# Patient Record
Sex: FEMALE | Race: WHITE | NOT HISPANIC OR LATINO | Employment: OTHER | URBAN - METROPOLITAN AREA
[De-identification: names, ages, dates, MRNs, and addresses within clinical notes are randomized per-mention and may not be internally consistent; named-entity substitution may affect disease eponyms.]

---

## 2020-12-16 ENCOUNTER — OFFICE VISIT (OUTPATIENT)
Dept: FAMILY MEDICINE | Facility: CLINIC | Age: 70
End: 2020-12-16
Payer: MEDICARE

## 2020-12-16 VITALS
OXYGEN SATURATION: 98 % | SYSTOLIC BLOOD PRESSURE: 170 MMHG | HEART RATE: 96 BPM | BODY MASS INDEX: 31.54 KG/M2 | TEMPERATURE: 97 F | WEIGHT: 178 LBS | DIASTOLIC BLOOD PRESSURE: 80 MMHG | HEIGHT: 63 IN

## 2020-12-16 DIAGNOSIS — I10 ESSENTIAL HYPERTENSION: ICD-10-CM

## 2020-12-16 DIAGNOSIS — Z86.010 HISTORY OF COLONIC POLYPS: ICD-10-CM

## 2020-12-16 DIAGNOSIS — M25.531 RIGHT WRIST PAIN: ICD-10-CM

## 2020-12-16 DIAGNOSIS — R25.2 MUSCLE CRAMPS AT NIGHT: ICD-10-CM

## 2020-12-16 DIAGNOSIS — E78.5 HYPERLIPIDEMIA, UNSPECIFIED HYPERLIPIDEMIA TYPE: ICD-10-CM

## 2020-12-16 DIAGNOSIS — E11.9 TYPE 2 DIABETES MELLITUS WITHOUT COMPLICATION, WITHOUT LONG-TERM CURRENT USE OF INSULIN: Primary | ICD-10-CM

## 2020-12-16 PROCEDURE — 99203 OFFICE O/P NEW LOW 30 MIN: CPT | Mod: S$GLB,,, | Performed by: INTERNAL MEDICINE

## 2020-12-16 PROCEDURE — 99203 PR OFFICE/OUTPT VISIT, NEW, LEVL III, 30-44 MIN: ICD-10-PCS | Mod: S$GLB,,, | Performed by: INTERNAL MEDICINE

## 2020-12-16 RX ORDER — METFORMIN HYDROCHLORIDE 1000 MG/1
1000 TABLET, FILM COATED, EXTENDED RELEASE ORAL DAILY
COMMUNITY
End: 2020-12-16 | Stop reason: SDUPTHER

## 2020-12-16 RX ORDER — METFORMIN HYDROCHLORIDE 1000 MG/1
1000 TABLET, FILM COATED, EXTENDED RELEASE ORAL
Qty: 90 TABLET | Refills: 1 | Status: SHIPPED | OUTPATIENT
Start: 2020-12-16 | End: 2021-05-19 | Stop reason: SDUPTHER

## 2020-12-16 RX ORDER — LOSARTAN POTASSIUM 25 MG/1
25 TABLET ORAL DAILY
COMMUNITY
End: 2020-12-16 | Stop reason: SDUPTHER

## 2020-12-16 RX ORDER — MULTIVITAMIN
1 TABLET ORAL DAILY
COMMUNITY

## 2020-12-16 RX ORDER — CALCIUM CARBONATE 500(1250)
1 TABLET,CHEWABLE ORAL DAILY
COMMUNITY

## 2020-12-16 RX ORDER — ROSUVASTATIN CALCIUM 10 MG/1
10 TABLET, COATED ORAL DAILY
Qty: 90 TABLET | Refills: 1 | Status: SHIPPED | OUTPATIENT
Start: 2020-12-16 | End: 2021-05-19 | Stop reason: SDUPTHER

## 2020-12-16 RX ORDER — ROSUVASTATIN CALCIUM 10 MG/1
10 TABLET, COATED ORAL DAILY
COMMUNITY
End: 2020-12-16 | Stop reason: SDUPTHER

## 2020-12-16 RX ORDER — LOSARTAN POTASSIUM 50 MG/1
50 TABLET ORAL DAILY
Qty: 90 TABLET | Refills: 1 | Status: SHIPPED | OUTPATIENT
Start: 2020-12-16 | End: 2021-05-19 | Stop reason: SDUPTHER

## 2020-12-16 NOTE — PROGRESS NOTES
Subjective:       Patient ID: May Solomon is a 70 y.o. female.    Chief Complaint: Establish Care (new patient establishment) and Wrist Pain (right wrist pain)    Here to establish care; recently moved from [place].  She has a h/o DM2, HTN HLD.  It has been at least 6 months since last labs.      Diabetes  She presents for her follow-up diabetic visit. She has type 2 diabetes mellitus. Her disease course has been stable. Pertinent negatives for hypoglycemia include no confusion, dizziness, headaches, nervousness/anxiousness, pallor, seizures, speech difficulty or tremors. Pertinent negatives for diabetes include no blurred vision, no chest pain, no fatigue, no foot paresthesias, no foot ulcerations, no polydipsia, no polyphagia, no polyuria, no visual change, no weakness and no weight loss. There are no hypoglycemic complications. Current diabetic treatment includes oral agent (monotherapy). She is compliant with treatment all of the time. Weight trend: has gained 8-10 pounds. She monitors blood glucose at home 1-2 x per day. Her breakfast blood glucose range is generally 110-130 mg/dl. An ACE inhibitor/angiotensin II receptor blocker is being taken. Eye exam is current (01/2020).   Hypertension  This is a chronic problem. Condition status: usually 120s/80s when she checks it which isn't very often. Pertinent negatives include no anxiety, blurred vision, chest pain, headaches, malaise/fatigue, neck pain, palpitations, peripheral edema or shortness of breath. Past treatments include angiotensin blockers. There are no compliance problems.    Hyperlipidemia  This is a chronic problem. Exacerbating diseases include diabetes. Associated symptoms include myalgias (nuvia horses in both legs when lying down). Pertinent negatives include no chest pain or shortness of breath. Current antihyperlipidemic treatment includes statins. There are no compliance problems.    Wrist Pain   The pain is present in the right  wrist. This is a new problem. The current episode started 1 to 4 weeks ago (about 2 weeks). There has been no history of extremity trauma (but has been moving a lot of boxes during recent move). The quality of the pain is described as aching. The pain is mild. Pertinent negatives include no fever or numbness. Her past medical history is significant for diabetes.     Review of Systems   Constitutional: Negative for activity change, appetite change, chills, diaphoresis, fatigue, fever, malaise/fatigue, unexpected weight change and weight loss.   HENT: Negative for congestion, ear discharge, ear pain, hearing loss, nosebleeds, postnasal drip, rhinorrhea, sinus pressure, sinus pain, sneezing, sore throat, tinnitus, trouble swallowing and voice change.    Eyes: Negative for blurred vision, photophobia, pain, discharge, redness, itching and visual disturbance.   Respiratory: Negative for apnea, cough, choking, chest tightness, shortness of breath, wheezing and stridor.    Cardiovascular: Negative for chest pain, palpitations and leg swelling.   Gastrointestinal: Negative for abdominal distention, abdominal pain, anal bleeding, blood in stool, constipation, diarrhea, nausea, rectal pain and vomiting.   Endocrine: Negative for cold intolerance, heat intolerance, polydipsia, polyphagia and polyuria.   Genitourinary: Negative for decreased urine volume, difficulty urinating, dysuria, frequency, genital sores, hematuria and urgency.   Musculoskeletal: Positive for arthralgias (right wrist pain) and myalgias (nuvia horses in both legs when lying down). Negative for back pain, gait problem, joint swelling, neck pain and neck stiffness.   Skin: Negative for color change, pallor, rash and wound.   Allergic/Immunologic: Negative for environmental allergies and food allergies.   Neurological: Negative for dizziness, tremors, seizures, syncope, facial asymmetry, speech difficulty, weakness, light-headedness, numbness and headaches.    Hematological: Negative for adenopathy. Does not bruise/bleed easily.   Psychiatric/Behavioral: Negative for confusion, decreased concentration, dysphoric mood, hallucinations, self-injury, sleep disturbance and suicidal ideas. The patient is not nervous/anxious.        Past Medical History:   Diagnosis Date    Diabetes mellitus, type 2     History of colonic polyps     HTN (hypertension)     Hyperlipidemia       Past Surgical History:   Procedure Laterality Date    COLONOSCOPY  05/2017       Family History   Problem Relation Age of Onset    Colon cancer Mother 76    Hypertension Mother     Obesity Mother     Prostate cancer Father     Breast cancer Neg Hx        Social History     Socioeconomic History    Marital status:      Spouse name: Not on file    Number of children: Not on file    Years of education: Not on file    Highest education level: Not on file   Occupational History    Occupation: retired   Social Needs    Financial resource strain: Not on file    Food insecurity     Worry: Not on file     Inability: Not on file    Transportation needs     Medical: Not on file     Non-medical: Not on file   Tobacco Use    Smoking status: Never Smoker    Smokeless tobacco: Never Used   Substance and Sexual Activity    Alcohol use: Yes     Frequency: 2-4 times a month     Drinks per session: 1 or 2     Comment: occasional    Drug use: Never    Sexual activity: Not Currently   Lifestyle    Physical activity     Days per week: Not on file     Minutes per session: Not on file    Stress: Only a little   Relationships    Social connections     Talks on phone: Not on file     Gets together: Not on file     Attends Confucianism service: Not on file     Active member of club or organization: Not on file     Attends meetings of clubs or organizations: Not on file     Relationship status: Not on file   Other Topics Concern    Not on file   Social History Narrative    Live with son       Current  "Outpatient Medications   Medication Sig Dispense Refill    calcium carbonate (OS-SANDEEP) 500 mg calcium (1,250 mg) chewable tablet Take 1 tablet by mouth once daily.      losartan (COZAAR) 50 MG tablet Take 1 tablet (50 mg total) by mouth once daily. 90 tablet 1    metFORMIN (GLUMETZA) 1000 MG (MOD) 24hr tablet Take 1 tablet (1,000 mg total) by mouth daily with breakfast. 90 tablet 1    multivitamin (ONE DAILY MULTIVITAMIN) per tablet Take 1 tablet by mouth once daily.      rosuvastatin (CRESTOR) 10 MG tablet Take 1 tablet (10 mg total) by mouth once daily. 90 tablet 1     No current facility-administered medications for this visit.        Review of patient's allergies indicates:   Allergen Reactions    Sulfa (sulfonamide antibiotics) Rash     Objective:    HPI     Establish Care      Additional comments: new patient establishment              Wrist Pain      Additional comments: right wrist pain          Last edited by Nelson Rosario MA on 12/16/2020  3:49 PM. (History)      Blood pressure (!) 170/80, pulse 96, temperature 96.6 °F (35.9 °C), temperature source Temporal, height 5' 3" (1.6 m), weight 80.7 kg (178 lb), SpO2 98 %. Body mass index is 31.53 kg/m².   Physical Exam  Vitals signs and nursing note reviewed.   Constitutional:       General: She is not in acute distress.     Appearance: She is well-developed. She is obese. She is not ill-appearing, toxic-appearing or diaphoretic.   HENT:      Head: Normocephalic and atraumatic.      Right Ear: Hearing, tympanic membrane, ear canal and external ear normal.      Left Ear: Hearing, tympanic membrane, ear canal and external ear normal.      Nose: Nose normal.      Mouth/Throat:      Pharynx: Uvula midline.   Eyes:      General: Lids are normal. No scleral icterus.        Right eye: No discharge.         Left eye: No discharge.      Conjunctiva/sclera: Conjunctivae normal.      Right eye: Right conjunctiva is not injected. No hemorrhage.     Left eye: Left " conjunctiva is not injected. No hemorrhage.     Pupils: Pupils are equal, round, and reactive to light.   Neck:      Thyroid: No thyromegaly.      Vascular: No carotid bruit.   Cardiovascular:      Rate and Rhythm: Normal rate and regular rhythm.      Pulses:           Dorsalis pedis pulses are 2+ on the right side and 2+ on the left side.      Heart sounds: Normal heart sounds. No murmur. No friction rub. No gallop.    Pulmonary:      Effort: Pulmonary effort is normal. No respiratory distress.      Breath sounds: Normal breath sounds. No wheezing, rhonchi or rales.   Abdominal:      General: Bowel sounds are normal. There is no distension or abdominal bruit.      Palpations: Abdomen is soft. There is no mass or pulsatile mass.      Tenderness: There is no abdominal tenderness. There is no guarding or rebound.      Hernia: No hernia is present.   Musculoskeletal:      Right lower leg: No edema.      Left lower leg: No edema.   Lymphadenopathy:      Cervical: No cervical adenopathy.   Skin:     General: Skin is warm and dry.   Neurological:      General: No focal deficit present.      Mental Status: She is alert.      Motor: No tremor.      Deep Tendon Reflexes: Reflexes are normal and symmetric.   Psychiatric:         Mood and Affect: Mood normal.         Speech: Speech normal.         Behavior: Behavior normal.             Assessment:       1. Type 2 diabetes mellitus without complication, without long-term current use of insulin    2. Essential hypertension    3. History of colonic polyps    4. Hyperlipidemia, unspecified hyperlipidemia type    5. Muscle cramps at night    6. Right wrist pain        Plan:       May was seen today for establish care and wrist pain.    Diagnoses and all orders for this visit:    Type 2 diabetes mellitus without complication, without long-term current use of insulin  -     metFORMIN (GLUMETZA) 1000 MG (MOD) 24hr tablet; Take 1 tablet (1,000 mg total) by mouth daily with  breakfast.  -     rosuvastatin (CRESTOR) 10 MG tablet; Take 1 tablet (10 mg total) by mouth once daily.  -     Comprehensive Metabolic Panel; Future  -     Hemoglobin A1C; Future  -     Lipid Panel; Future  -     Microalbumin/Creatinine Ratio, Urine; Future  -     Urinalysis; Future  -     Ambulatory referral/consult to Ophthalmology; Future  -     Comprehensive Metabolic Panel  -     Hemoglobin A1C  -     Lipid Panel  -     Microalbumin/Creatinine Ratio, Urine  -     Urinalysis    Essential hypertension  -     losartan (COZAAR) 50 MG tablet; Take 1 tablet (50 mg total) by mouth once daily.    History of colonic polyps    Hyperlipidemia, unspecified hyperlipidemia type  -     rosuvastatin (CRESTOR) 10 MG tablet; Take 1 tablet (10 mg total) by mouth once daily.    Muscle cramps at night  -     Magnesium; Future  -     Magnesium    Right wrist pain  Comments:  Likely overuse injury.  Tylenol first line, NSAIDs if not effective.

## 2021-01-22 LAB
ALBUMIN SERPL-MCNC: 4.6 G/DL (ref 3.6–5.1)
ALBUMIN/CREAT UR: 13 MCG/MG CREAT
ALBUMIN/GLOB SERPL: 2 (CALC) (ref 1–2.5)
ALP SERPL-CCNC: 51 U/L (ref 37–153)
ALT SERPL-CCNC: 25 U/L (ref 6–29)
APPEARANCE UR: CLEAR
AST SERPL-CCNC: 26 U/L (ref 10–35)
BILIRUB SERPL-MCNC: 0.7 MG/DL (ref 0.2–1.2)
BILIRUB UR QL STRIP: NEGATIVE
BUN SERPL-MCNC: 17 MG/DL (ref 7–25)
BUN/CREAT SERPL: 17 (CALC) (ref 6–22)
CALCIUM SERPL-MCNC: 9.9 MG/DL (ref 8.6–10.4)
CHLORIDE SERPL-SCNC: 106 MMOL/L (ref 98–110)
CHOLEST SERPL-MCNC: 138 MG/DL
CHOLEST/HDLC SERPL: 2.1 (CALC)
CO2 SERPL-SCNC: 29 MMOL/L (ref 20–32)
COLOR UR: ABNORMAL
CREAT SERPL-MCNC: 1 MG/DL (ref 0.6–0.93)
CREAT UR-MCNC: 114 MG/DL (ref 20–275)
GFRSERPLBLD MDRD-ARVRAT: 56 ML/MIN/1.73M2
GLOBULIN SER CALC-MCNC: 2.3 G/DL (CALC) (ref 1.9–3.7)
GLUCOSE SERPL-MCNC: 126 MG/DL (ref 65–99)
GLUCOSE UR QL STRIP: NEGATIVE
HBA1C MFR BLD: 6.2 % OF TOTAL HGB
HDLC SERPL-MCNC: 66 MG/DL
HGB UR QL STRIP: NEGATIVE
KETONES UR QL STRIP: NEGATIVE
LDLC SERPL CALC-MCNC: 53 MG/DL (CALC)
LEUKOCYTE ESTERASE UR QL STRIP: ABNORMAL
MAGNESIUM SERPL-MCNC: 1.9 MG/DL (ref 1.5–2.5)
MICROALBUMIN UR-MCNC: 1.5 MG/DL
NITRITE UR QL STRIP: NEGATIVE
NONHDLC SERPL-MCNC: 72 MG/DL (CALC)
PH UR STRIP: 6 [PH] (ref 5–8)
POTASSIUM SERPL-SCNC: 4.8 MMOL/L (ref 3.5–5.3)
PROT SERPL-MCNC: 6.9 G/DL (ref 6.1–8.1)
PROT UR QL STRIP: NEGATIVE
SODIUM SERPL-SCNC: 140 MMOL/L (ref 135–146)
SP GR UR STRIP: 1.02 (ref 1–1.03)
TRIGL SERPL-MCNC: 102 MG/DL

## 2021-01-25 ENCOUNTER — TELEPHONE (OUTPATIENT)
Dept: FAMILY MEDICINE | Facility: CLINIC | Age: 71
End: 2021-01-25

## 2021-02-17 ENCOUNTER — OFFICE VISIT (OUTPATIENT)
Dept: FAMILY MEDICINE | Facility: CLINIC | Age: 71
End: 2021-02-17
Payer: MEDICARE

## 2021-02-17 VITALS
HEART RATE: 73 BPM | OXYGEN SATURATION: 97 % | HEIGHT: 63 IN | BODY MASS INDEX: 31.36 KG/M2 | SYSTOLIC BLOOD PRESSURE: 140 MMHG | TEMPERATURE: 98 F | DIASTOLIC BLOOD PRESSURE: 70 MMHG | WEIGHT: 177 LBS

## 2021-02-17 DIAGNOSIS — E78.00 PURE HYPERCHOLESTEROLEMIA: ICD-10-CM

## 2021-02-17 DIAGNOSIS — E11.9 TYPE 2 DIABETES MELLITUS WITHOUT COMPLICATION, WITHOUT LONG-TERM CURRENT USE OF INSULIN: ICD-10-CM

## 2021-02-17 DIAGNOSIS — I10 ESSENTIAL HYPERTENSION: Primary | ICD-10-CM

## 2021-02-17 PROCEDURE — 99212 OFFICE O/P EST SF 10 MIN: CPT | Mod: S$GLB,,, | Performed by: INTERNAL MEDICINE

## 2021-02-17 PROCEDURE — 99212 PR OFFICE/OUTPT VISIT, EST, LEVL II, 10-19 MIN: ICD-10-PCS | Mod: S$GLB,,, | Performed by: INTERNAL MEDICINE

## 2021-05-05 ENCOUNTER — TELEPHONE (OUTPATIENT)
Dept: FAMILY MEDICINE | Facility: CLINIC | Age: 71
End: 2021-05-05

## 2021-05-05 DIAGNOSIS — E11.9 TYPE 2 DIABETES MELLITUS WITHOUT COMPLICATION, WITHOUT LONG-TERM CURRENT USE OF INSULIN: Primary | ICD-10-CM

## 2021-05-11 ENCOUNTER — TELEPHONE (OUTPATIENT)
Dept: FAMILY MEDICINE | Facility: CLINIC | Age: 71
End: 2021-05-11

## 2021-05-15 LAB
ALBUMIN SERPL-MCNC: 4.5 G/DL (ref 3.6–5.1)
ALBUMIN/GLOB SERPL: 1.7 (CALC) (ref 1–2.5)
ALP SERPL-CCNC: 52 U/L (ref 37–153)
ALT SERPL-CCNC: 21 U/L (ref 6–29)
AST SERPL-CCNC: 22 U/L (ref 10–35)
BILIRUB SERPL-MCNC: 1.1 MG/DL (ref 0.2–1.2)
BUN SERPL-MCNC: 20 MG/DL (ref 7–25)
BUN/CREAT SERPL: 20 (CALC) (ref 6–22)
CALCIUM SERPL-MCNC: 10.3 MG/DL (ref 8.6–10.4)
CHLORIDE SERPL-SCNC: 101 MMOL/L (ref 98–110)
CO2 SERPL-SCNC: 31 MMOL/L (ref 20–32)
CREAT SERPL-MCNC: 1.02 MG/DL (ref 0.6–0.93)
GLOBULIN SER CALC-MCNC: 2.6 G/DL (CALC) (ref 1.9–3.7)
GLUCOSE SERPL-MCNC: 126 MG/DL (ref 65–99)
HBA1C MFR BLD: 6.4 % OF TOTAL HGB
POTASSIUM SERPL-SCNC: 4.7 MMOL/L (ref 3.5–5.3)
PROT SERPL-MCNC: 7.1 G/DL (ref 6.1–8.1)
SODIUM SERPL-SCNC: 139 MMOL/L (ref 135–146)
TSH SERPL-ACNC: 2.18 MIU/L (ref 0.4–4.5)

## 2021-05-19 ENCOUNTER — OFFICE VISIT (OUTPATIENT)
Dept: FAMILY MEDICINE | Facility: CLINIC | Age: 71
End: 2021-05-19
Payer: MEDICARE

## 2021-05-19 VITALS
BODY MASS INDEX: 31.71 KG/M2 | HEART RATE: 72 BPM | HEIGHT: 63 IN | WEIGHT: 179 LBS | TEMPERATURE: 98 F | DIASTOLIC BLOOD PRESSURE: 52 MMHG | OXYGEN SATURATION: 97 % | SYSTOLIC BLOOD PRESSURE: 108 MMHG

## 2021-05-19 DIAGNOSIS — E78.00 PURE HYPERCHOLESTEROLEMIA: ICD-10-CM

## 2021-05-19 DIAGNOSIS — I10 ESSENTIAL HYPERTENSION: ICD-10-CM

## 2021-05-19 DIAGNOSIS — Z12.31 VISIT FOR SCREENING MAMMOGRAM: ICD-10-CM

## 2021-05-19 DIAGNOSIS — E11.9 TYPE 2 DIABETES MELLITUS WITHOUT COMPLICATION, WITHOUT LONG-TERM CURRENT USE OF INSULIN: Primary | ICD-10-CM

## 2021-05-19 DIAGNOSIS — M25.531 RIGHT WRIST PAIN: ICD-10-CM

## 2021-05-19 PROBLEM — N90.5 ATROPHIC VULVA: Status: ACTIVE | Noted: 2021-05-19

## 2021-05-19 PROBLEM — E66.9 OBESITY WITH BODY MASS INDEX 30 OR GREATER: Status: ACTIVE | Noted: 2021-05-19

## 2021-05-19 PROBLEM — N95.1 MENOPAUSAL SYMPTOM: Status: ACTIVE | Noted: 2021-05-19

## 2021-05-19 PROBLEM — M85.80 OSTEOPENIA: Status: ACTIVE | Noted: 2021-05-19

## 2021-05-19 PROCEDURE — 99214 OFFICE O/P EST MOD 30 MIN: CPT | Mod: S$GLB,,, | Performed by: INTERNAL MEDICINE

## 2021-05-19 PROCEDURE — 99214 PR OFFICE/OUTPT VISIT, EST, LEVL IV, 30-39 MIN: ICD-10-PCS | Mod: S$GLB,,, | Performed by: INTERNAL MEDICINE

## 2021-05-19 RX ORDER — ROSUVASTATIN CALCIUM 10 MG/1
10 TABLET, COATED ORAL DAILY
Qty: 90 TABLET | Refills: 1 | Status: SHIPPED | OUTPATIENT
Start: 2021-05-19 | End: 2021-10-19 | Stop reason: SDUPTHER

## 2021-05-19 RX ORDER — DICLOFENAC SODIUM 10 MG/G
2 GEL TOPICAL DAILY
COMMUNITY
Start: 2021-05-19 | End: 2021-10-19

## 2021-05-19 RX ORDER — METFORMIN HYDROCHLORIDE 1000 MG/1
1000 TABLET, FILM COATED, EXTENDED RELEASE ORAL
Qty: 90 TABLET | Refills: 1 | Status: SHIPPED | OUTPATIENT
Start: 2021-05-19 | End: 2021-10-01 | Stop reason: SDUPTHER

## 2021-05-19 RX ORDER — LOSARTAN POTASSIUM 50 MG/1
50 TABLET ORAL DAILY
Qty: 90 TABLET | Refills: 1 | Status: SHIPPED | OUTPATIENT
Start: 2021-05-19 | End: 2021-10-19 | Stop reason: SDUPTHER

## 2021-08-11 ENCOUNTER — HOSPITAL ENCOUNTER (OUTPATIENT)
Dept: RADIOLOGY | Facility: HOSPITAL | Age: 71
Discharge: HOME | End: 2021-08-11
Attending: INTERNAL MEDICINE
Payer: MEDICARE

## 2021-08-11 DIAGNOSIS — Z12.31 VISIT FOR SCREENING MAMMOGRAM: ICD-10-CM

## 2021-08-11 PROCEDURE — 77067 SCR MAMMO BI INCL CAD: CPT | Mod: TC,PO

## 2021-10-11 DIAGNOSIS — E11.9 TYPE 2 DIABETES MELLITUS WITHOUT COMPLICATION, WITHOUT LONG-TERM CURRENT USE OF INSULIN: Primary | ICD-10-CM

## 2021-10-16 LAB
ALBUMIN SERPL-MCNC: 4.7 G/DL (ref 3.6–5.1)
ALBUMIN/GLOB SERPL: 2 (CALC) (ref 1–2.5)
ALP SERPL-CCNC: 50 U/L (ref 37–153)
ALT SERPL-CCNC: 25 U/L (ref 6–29)
AST SERPL-CCNC: 26 U/L (ref 10–35)
BILIRUB SERPL-MCNC: 1.1 MG/DL (ref 0.2–1.2)
BUN SERPL-MCNC: 22 MG/DL (ref 7–25)
BUN/CREAT SERPL: 21 (CALC) (ref 6–22)
CALCIUM SERPL-MCNC: 9.9 MG/DL (ref 8.6–10.4)
CHLORIDE SERPL-SCNC: 102 MMOL/L (ref 98–110)
CO2 SERPL-SCNC: 30 MMOL/L (ref 20–32)
CREAT SERPL-MCNC: 1.04 MG/DL (ref 0.6–0.93)
GLOBULIN SER CALC-MCNC: 2.4 G/DL (CALC) (ref 1.9–3.7)
GLUCOSE SERPL-MCNC: 133 MG/DL (ref 65–99)
HBA1C MFR BLD: 6.1 % OF TOTAL HGB
POTASSIUM SERPL-SCNC: 5.1 MMOL/L (ref 3.5–5.3)
PROT SERPL-MCNC: 7.1 G/DL (ref 6.1–8.1)
SODIUM SERPL-SCNC: 140 MMOL/L (ref 135–146)

## 2021-10-19 ENCOUNTER — OFFICE VISIT (OUTPATIENT)
Dept: FAMILY MEDICINE | Facility: CLINIC | Age: 71
End: 2021-10-19
Payer: MEDICARE

## 2021-10-19 VITALS
BODY MASS INDEX: 30.12 KG/M2 | HEIGHT: 63 IN | TEMPERATURE: 97 F | SYSTOLIC BLOOD PRESSURE: 116 MMHG | OXYGEN SATURATION: 96 % | HEART RATE: 82 BPM | WEIGHT: 170 LBS | DIASTOLIC BLOOD PRESSURE: 60 MMHG

## 2021-10-19 DIAGNOSIS — E11.9 TYPE 2 DIABETES MELLITUS WITHOUT COMPLICATION, WITHOUT LONG-TERM CURRENT USE OF INSULIN: Primary | ICD-10-CM

## 2021-10-19 DIAGNOSIS — E78.00 PURE HYPERCHOLESTEROLEMIA: ICD-10-CM

## 2021-10-19 DIAGNOSIS — I10 ESSENTIAL HYPERTENSION: ICD-10-CM

## 2021-10-19 DIAGNOSIS — M65.342 TRIGGER RING FINGER OF LEFT HAND: ICD-10-CM

## 2021-10-19 PROCEDURE — 99214 OFFICE O/P EST MOD 30 MIN: CPT | Mod: S$GLB,,, | Performed by: INTERNAL MEDICINE

## 2021-10-19 PROCEDURE — 99214 PR OFFICE/OUTPT VISIT, EST, LEVL IV, 30-39 MIN: ICD-10-PCS | Mod: S$GLB,,, | Performed by: INTERNAL MEDICINE

## 2021-10-19 RX ORDER — ROSUVASTATIN CALCIUM 10 MG/1
10 TABLET, COATED ORAL DAILY
Qty: 90 TABLET | Refills: 1 | Status: SHIPPED | OUTPATIENT
Start: 2021-10-19 | End: 2022-03-22 | Stop reason: SDUPTHER

## 2021-10-19 RX ORDER — METFORMIN HYDROCHLORIDE 1000 MG/1
1000 TABLET, FILM COATED, EXTENDED RELEASE ORAL
Qty: 90 TABLET | Refills: 1 | Status: SHIPPED | OUTPATIENT
Start: 2021-10-19 | End: 2022-03-22 | Stop reason: SDUPTHER

## 2021-10-19 RX ORDER — LOSARTAN POTASSIUM 50 MG/1
50 TABLET ORAL DAILY
Qty: 90 TABLET | Refills: 1 | Status: SHIPPED | OUTPATIENT
Start: 2021-10-19 | End: 2022-03-22 | Stop reason: SDUPTHER

## 2021-11-02 ENCOUNTER — OFFICE VISIT (OUTPATIENT)
Dept: ORTHOPEDICS | Facility: CLINIC | Age: 71
End: 2021-11-02
Payer: MEDICARE

## 2021-11-02 VITALS — BODY MASS INDEX: 30.12 KG/M2 | WEIGHT: 170 LBS | HEIGHT: 63 IN

## 2021-11-02 DIAGNOSIS — M65.342 TRIGGER RING FINGER OF LEFT HAND: Primary | ICD-10-CM

## 2021-11-02 PROCEDURE — 99203 PR OFFICE/OUTPT VISIT, NEW, LEVL III, 30-44 MIN: ICD-10-PCS | Mod: 25,S$GLB,, | Performed by: ORTHOPAEDIC SURGERY

## 2021-11-02 PROCEDURE — 99203 OFFICE O/P NEW LOW 30 MIN: CPT | Mod: 25,S$GLB,, | Performed by: ORTHOPAEDIC SURGERY

## 2021-11-02 PROCEDURE — 20550 TENDON SHEATH: ICD-10-PCS | Mod: LT,S$GLB,, | Performed by: ORTHOPAEDIC SURGERY

## 2021-11-02 PROCEDURE — 20550 NJX 1 TENDON SHEATH/LIGAMENT: CPT | Mod: LT,S$GLB,, | Performed by: ORTHOPAEDIC SURGERY

## 2021-11-02 RX ORDER — TRIAMCINOLONE ACETONIDE 40 MG/ML
40 INJECTION, SUSPENSION INTRA-ARTICULAR; INTRAMUSCULAR
Status: DISCONTINUED | OUTPATIENT
Start: 2021-11-02 | End: 2021-11-02 | Stop reason: HOSPADM

## 2021-11-02 RX ADMIN — TRIAMCINOLONE ACETONIDE 40 MG: 40 INJECTION, SUSPENSION INTRA-ARTICULAR; INTRAMUSCULAR at 10:11

## 2022-03-15 ENCOUNTER — TELEPHONE (OUTPATIENT)
Dept: FAMILY MEDICINE | Facility: CLINIC | Age: 72
End: 2022-03-15
Payer: MEDICARE

## 2022-03-15 DIAGNOSIS — E11.9 TYPE 2 DIABETES MELLITUS WITHOUT COMPLICATION, WITHOUT LONG-TERM CURRENT USE OF INSULIN: Primary | ICD-10-CM

## 2022-03-15 DIAGNOSIS — Z11.59 NEED FOR HEPATITIS C SCREENING TEST: ICD-10-CM

## 2022-03-18 LAB
ALBUMIN SERPL-MCNC: 4.4 G/DL (ref 3.6–5.1)
ALBUMIN/CREAT UR: 11 MCG/MG CREAT
ALBUMIN/GLOB SERPL: 1.8 (CALC) (ref 1–2.5)
ALP SERPL-CCNC: 45 U/L (ref 37–153)
ALT SERPL-CCNC: 16 U/L (ref 6–29)
APPEARANCE UR: CLEAR
AST SERPL-CCNC: 20 U/L (ref 10–35)
BILIRUB SERPL-MCNC: 1 MG/DL (ref 0.2–1.2)
BILIRUB UR QL STRIP: NEGATIVE
BUN SERPL-MCNC: 18 MG/DL (ref 7–25)
BUN/CREAT SERPL: 17 (CALC) (ref 6–22)
CALCIUM SERPL-MCNC: 9.7 MG/DL (ref 8.6–10.4)
CHLORIDE SERPL-SCNC: 105 MMOL/L (ref 98–110)
CHOLEST SERPL-MCNC: 146 MG/DL
CHOLEST/HDLC SERPL: 2.4 (CALC)
CO2 SERPL-SCNC: 27 MMOL/L (ref 20–32)
COLOR UR: ABNORMAL
CREAT SERPL-MCNC: 1.06 MG/DL (ref 0.6–0.93)
CREAT UR-MCNC: 190 MG/DL (ref 20–275)
GLOBULIN SER CALC-MCNC: 2.4 G/DL (CALC) (ref 1.9–3.7)
GLUCOSE SERPL-MCNC: 120 MG/DL (ref 65–99)
GLUCOSE UR QL STRIP: NEGATIVE
HBA1C MFR BLD: 6.1 % OF TOTAL HGB
HCV AB S/CO SERPL IA: 0.17
HCV AB SERPL QL IA: NORMAL
HDLC SERPL-MCNC: 62 MG/DL
HGB UR QL STRIP: NEGATIVE
KETONES UR QL STRIP: ABNORMAL
LDLC SERPL CALC-MCNC: 64 MG/DL (CALC)
LEUKOCYTE ESTERASE UR QL STRIP: ABNORMAL
MICROALBUMIN UR-MCNC: 2 MG/DL
NITRITE UR QL STRIP: NEGATIVE
NONHDLC SERPL-MCNC: 84 MG/DL (CALC)
PH UR STRIP: ABNORMAL [PH] (ref 5–8)
POTASSIUM SERPL-SCNC: 4.4 MMOL/L (ref 3.5–5.3)
PROT SERPL-MCNC: 6.8 G/DL (ref 6.1–8.1)
PROT UR QL STRIP: NEGATIVE
SODIUM SERPL-SCNC: 140 MMOL/L (ref 135–146)
SP GR UR STRIP: 1.02 (ref 1–1.03)
TRIGL SERPL-MCNC: 116 MG/DL
TSH SERPL-ACNC: 4.98 MIU/L (ref 0.4–4.5)

## 2022-03-22 ENCOUNTER — OFFICE VISIT (OUTPATIENT)
Dept: FAMILY MEDICINE | Facility: CLINIC | Age: 72
End: 2022-03-22
Payer: MEDICARE

## 2022-03-22 VITALS
OXYGEN SATURATION: 95 % | BODY MASS INDEX: 31.01 KG/M2 | TEMPERATURE: 97 F | SYSTOLIC BLOOD PRESSURE: 130 MMHG | HEART RATE: 95 BPM | WEIGHT: 175 LBS | DIASTOLIC BLOOD PRESSURE: 70 MMHG | HEIGHT: 63 IN

## 2022-03-22 DIAGNOSIS — Z80.0 FAMILY HISTORY OF COLON CANCER IN MOTHER: ICD-10-CM

## 2022-03-22 DIAGNOSIS — E11.9 TYPE 2 DIABETES MELLITUS WITHOUT COMPLICATION, WITHOUT LONG-TERM CURRENT USE OF INSULIN: Primary | ICD-10-CM

## 2022-03-22 DIAGNOSIS — Z86.010 PERSONAL HISTORY OF COLONIC POLYPS: ICD-10-CM

## 2022-03-22 DIAGNOSIS — E03.8 SUBCLINICAL HYPOTHYROIDISM: ICD-10-CM

## 2022-03-22 DIAGNOSIS — I10 ESSENTIAL HYPERTENSION: ICD-10-CM

## 2022-03-22 DIAGNOSIS — E78.00 PURE HYPERCHOLESTEROLEMIA: ICD-10-CM

## 2022-03-22 PROCEDURE — 4010F PR ACE/ARB THEARPY RXD/TAKEN: ICD-10-PCS | Mod: S$GLB,,, | Performed by: INTERNAL MEDICINE

## 2022-03-22 PROCEDURE — 1101F PT FALLS ASSESS-DOCD LE1/YR: CPT | Mod: S$GLB,,, | Performed by: INTERNAL MEDICINE

## 2022-03-22 PROCEDURE — 3075F PR MOST RECENT SYSTOLIC BLOOD PRESS GE 130-139MM HG: ICD-10-PCS | Mod: S$GLB,,, | Performed by: INTERNAL MEDICINE

## 2022-03-22 PROCEDURE — 99214 PR OFFICE/OUTPT VISIT, EST, LEVL IV, 30-39 MIN: ICD-10-PCS | Mod: S$GLB,,, | Performed by: INTERNAL MEDICINE

## 2022-03-22 PROCEDURE — 3288F FALL RISK ASSESSMENT DOCD: CPT | Mod: S$GLB,,, | Performed by: INTERNAL MEDICINE

## 2022-03-22 PROCEDURE — 3078F PR MOST RECENT DIASTOLIC BLOOD PRESSURE < 80 MM HG: ICD-10-PCS | Mod: S$GLB,,, | Performed by: INTERNAL MEDICINE

## 2022-03-22 PROCEDURE — 4010F ACE/ARB THERAPY RXD/TAKEN: CPT | Mod: S$GLB,,, | Performed by: INTERNAL MEDICINE

## 2022-03-22 PROCEDURE — 3008F BODY MASS INDEX DOCD: CPT | Mod: S$GLB,,, | Performed by: INTERNAL MEDICINE

## 2022-03-22 PROCEDURE — 3061F NEG MICROALBUMINURIA REV: CPT | Mod: S$GLB,,, | Performed by: INTERNAL MEDICINE

## 2022-03-22 PROCEDURE — 3044F HG A1C LEVEL LT 7.0%: CPT | Mod: S$GLB,,, | Performed by: INTERNAL MEDICINE

## 2022-03-22 PROCEDURE — 3008F PR BODY MASS INDEX (BMI) DOCUMENTED: ICD-10-PCS | Mod: S$GLB,,, | Performed by: INTERNAL MEDICINE

## 2022-03-22 PROCEDURE — 1126F AMNT PAIN NOTED NONE PRSNT: CPT | Mod: S$GLB,,, | Performed by: INTERNAL MEDICINE

## 2022-03-22 PROCEDURE — 3075F SYST BP GE 130 - 139MM HG: CPT | Mod: S$GLB,,, | Performed by: INTERNAL MEDICINE

## 2022-03-22 PROCEDURE — 1126F PR PAIN SEVERITY QUANTIFIED, NO PAIN PRESENT: ICD-10-PCS | Mod: S$GLB,,, | Performed by: INTERNAL MEDICINE

## 2022-03-22 PROCEDURE — 1159F PR MEDICATION LIST DOCUMENTED IN MEDICAL RECORD: ICD-10-PCS | Mod: S$GLB,,, | Performed by: INTERNAL MEDICINE

## 2022-03-22 PROCEDURE — 3066F NEPHROPATHY DOC TX: CPT | Mod: S$GLB,,, | Performed by: INTERNAL MEDICINE

## 2022-03-22 PROCEDURE — 3066F PR DOCUMENTATION OF TREATMENT FOR NEPHROPATHY: ICD-10-PCS | Mod: S$GLB,,, | Performed by: INTERNAL MEDICINE

## 2022-03-22 PROCEDURE — 3288F PR FALLS RISK ASSESSMENT DOCUMENTED: ICD-10-PCS | Mod: S$GLB,,, | Performed by: INTERNAL MEDICINE

## 2022-03-22 PROCEDURE — 1101F PR PT FALLS ASSESS DOC 0-1 FALLS W/OUT INJ PAST YR: ICD-10-PCS | Mod: S$GLB,,, | Performed by: INTERNAL MEDICINE

## 2022-03-22 PROCEDURE — 1159F MED LIST DOCD IN RCRD: CPT | Mod: S$GLB,,, | Performed by: INTERNAL MEDICINE

## 2022-03-22 PROCEDURE — 3044F PR MOST RECENT HEMOGLOBIN A1C LEVEL <7.0%: ICD-10-PCS | Mod: S$GLB,,, | Performed by: INTERNAL MEDICINE

## 2022-03-22 PROCEDURE — 3061F PR NEG MICROALBUMINURIA RESULT DOCUMENTED/REVIEW: ICD-10-PCS | Mod: S$GLB,,, | Performed by: INTERNAL MEDICINE

## 2022-03-22 PROCEDURE — 99214 OFFICE O/P EST MOD 30 MIN: CPT | Mod: S$GLB,,, | Performed by: INTERNAL MEDICINE

## 2022-03-22 PROCEDURE — 3078F DIAST BP <80 MM HG: CPT | Mod: S$GLB,,, | Performed by: INTERNAL MEDICINE

## 2022-03-22 RX ORDER — ROSUVASTATIN CALCIUM 10 MG/1
10 TABLET, COATED ORAL DAILY
Qty: 90 TABLET | Refills: 1 | Status: SHIPPED | OUTPATIENT
Start: 2022-03-22 | End: 2022-09-19 | Stop reason: SDUPTHER

## 2022-03-22 RX ORDER — METFORMIN HYDROCHLORIDE 1000 MG/1
1000 TABLET, FILM COATED, EXTENDED RELEASE ORAL
Qty: 90 TABLET | Refills: 1 | Status: SHIPPED | OUTPATIENT
Start: 2022-03-22 | End: 2022-05-23 | Stop reason: CLARIF

## 2022-03-22 RX ORDER — LOSARTAN POTASSIUM 50 MG/1
50 TABLET ORAL DAILY
Qty: 90 TABLET | Refills: 1 | Status: SHIPPED | OUTPATIENT
Start: 2022-03-22 | End: 2022-09-19 | Stop reason: SDUPTHER

## 2022-03-22 NOTE — PROGRESS NOTES
Subjective:       Patient ID: May Solomon is a 71 y.o. female.    Chief Complaint: Hypertension (5 months follow up) and Diabetes    Here for routine follow up.       Hypertension  This is a chronic problem. The problem is controlled (usually 120s/80s when she checks it which isn't very often). Pertinent negatives include no anxiety, blurred vision, chest pain, headaches, malaise/fatigue, neck pain, palpitations, peripheral edema or shortness of breath. Past treatments include angiotensin blockers. There are no compliance problems.    Diabetes  She presents for her follow-up diabetic visit. She has type 2 diabetes mellitus. Her disease course has been stable. Pertinent negatives for hypoglycemia include no confusion, dizziness, headaches, nervousness/anxiousness, pallor, seizures, speech difficulty or tremors. Pertinent negatives for diabetes include no blurred vision, no chest pain, no fatigue, no foot paresthesias, no foot ulcerations, no polydipsia, no polyphagia, no polyuria, no visual change, no weakness and no weight loss. There are no hypoglycemic complications. Current diabetic treatment includes oral agent (monotherapy). She is compliant with treatment all of the time. Weight trend: down 9 pounds since last visit. She monitors blood glucose at home 1-2 x per day. Her breakfast blood glucose range is generally 110-130 mg/dl. An ACE inhibitor/angiotensin II receptor blocker is being taken. Eye exam is current (03/2022).   Hyperlipidemia  This is a chronic problem. Exacerbating diseases include diabetes. Pertinent negatives include no chest pain, myalgias or shortness of breath. Current antihyperlipidemic treatment includes statins. There are no compliance problems.      Review of Systems   Constitutional: Negative for activity change, appetite change, chills, diaphoresis, fatigue, fever, malaise/fatigue, unexpected weight change and weight loss.   HENT: Negative for congestion, ear discharge, ear  pain, hearing loss, mouth sores, nosebleeds, postnasal drip, rhinorrhea, sinus pressure, sinus pain, sneezing, sore throat, tinnitus, trouble swallowing and voice change.    Eyes: Negative for blurred vision, photophobia, pain, discharge, redness, itching and visual disturbance.   Respiratory: Negative for apnea, cough, choking, chest tightness, shortness of breath and wheezing.    Cardiovascular: Negative for chest pain, palpitations and leg swelling.   Gastrointestinal: Negative for abdominal distention, abdominal pain, blood in stool, constipation, diarrhea, nausea and vomiting.   Endocrine: Negative for cold intolerance, heat intolerance, polydipsia, polyphagia and polyuria.   Genitourinary: Negative for decreased urine volume, difficulty urinating, dyspareunia, dysuria, enuresis, frequency, genital sores, hematuria, menstrual problem, pelvic pain, urgency, vaginal bleeding, vaginal discharge and vaginal pain.   Musculoskeletal: Negative for arthralgias, back pain, gait problem, joint swelling, myalgias, neck pain and neck stiffness.   Skin: Negative for pallor, rash and wound.   Allergic/Immunologic: Negative for environmental allergies, food allergies and immunocompromised state.   Neurological: Negative for dizziness, tremors, seizures, syncope, facial asymmetry, speech difficulty, weakness, light-headedness, numbness and headaches.   Hematological: Negative for adenopathy. Does not bruise/bleed easily.   Psychiatric/Behavioral: Negative for confusion, decreased concentration, hallucinations, self-injury, sleep disturbance and suicidal ideas. The patient is not nervous/anxious.        Past Medical History:   Diagnosis Date    Basal cell carcinoma     Diabetes mellitus, type 2     History of colonic polyps     HTN (hypertension)     Hyperlipidemia       Past Surgical History:   Procedure Laterality Date    BREAST BIOPSY      COLONOSCOPY  05/2017    EYE SURGERY Right 05/2021       Family History  "  Problem Relation Age of Onset    Colon cancer Mother 76    Hypertension Mother     Obesity Mother     Prostate cancer Father     Breast cancer Neg Hx        Social History     Socioeconomic History    Marital status:    Occupational History    Occupation: retired   Tobacco Use    Smoking status: Never Smoker    Smokeless tobacco: Never Used   Substance and Sexual Activity    Alcohol use: Yes     Comment: occasional    Drug use: Never    Sexual activity: Not Currently   Social History Narrative    Live with son       Current Outpatient Medications   Medication Sig Dispense Refill    calcium carbonate (OS-SANDEEP) 500 mg calcium (1,250 mg) chewable tablet Take 1 tablet by mouth once daily.      multivitamin (THERAGRAN) per tablet Take 1 tablet by mouth once daily.      losartan (COZAAR) 50 MG tablet Take 1 tablet (50 mg total) by mouth once daily. 90 tablet 1    metFORMIN (GLUMETZA) 1000 MG (MOD) 24hr tablet Take 1 tablet (1,000 mg total) by mouth daily with breakfast. 90 tablet 1    rosuvastatin (CRESTOR) 10 MG tablet Take 1 tablet (10 mg total) by mouth once daily. 90 tablet 1     No current facility-administered medications for this visit.       Review of patient's allergies indicates:   Allergen Reactions    Sulfa (sulfonamide antibiotics) Rash     Objective:    HPI     Hypertension      Additional comments: 5 months follow up          Last edited by Nelson Rosario MA on 3/22/2022  2:18 PM. (History)      Blood pressure 130/70, pulse 95, temperature 96.9 °F (36.1 °C), temperature source Temporal, height 5' 3" (1.6 m), weight 79.4 kg (175 lb), SpO2 95 %. Body mass index is 31 kg/m².   Physical Exam  Vitals and nursing note reviewed.   Constitutional:       General: She is not in acute distress.     Appearance: She is well-developed. She is obese. She is not ill-appearing, toxic-appearing or diaphoretic.   HENT:      Head: Normocephalic and atraumatic.   Eyes:      General: No scleral " icterus.        Right eye: No discharge.         Left eye: No discharge.      Conjunctiva/sclera: Conjunctivae normal.   Neck:      Vascular: No carotid bruit.   Cardiovascular:      Rate and Rhythm: Normal rate and regular rhythm.      Heart sounds: Normal heart sounds. No murmur heard.  Pulmonary:      Effort: Pulmonary effort is normal. No respiratory distress.      Breath sounds: Normal breath sounds. No decreased breath sounds, wheezing, rhonchi or rales.   Abdominal:      General: There is no distension.      Palpations: Abdomen is soft.      Tenderness: There is no abdominal tenderness. There is no guarding or rebound.   Musculoskeletal:      Right lower leg: No edema.      Left lower leg: No edema.   Skin:     General: Skin is warm and dry.   Neurological:      Mental Status: She is alert.      Motor: No tremor.   Psychiatric:         Mood and Affect: Mood normal.         Speech: Speech normal.         Behavior: Behavior normal.           Telephone on 03/15/2022   Component Date Value Ref Range Status    Glucose 03/17/2022 120 (A) 65 - 99 mg/dL Final    Comment:               Fasting reference interval     For someone without known diabetes, a glucose value  between 100 and 125 mg/dL is consistent with  prediabetes and should be confirmed with a  follow-up test.         BUN 03/17/2022 18  7 - 25 mg/dL Final    Creatinine 03/17/2022 1.06 (A) 0.60 - 0.93 mg/dL Final    Comment: For patients >49 years of age, the reference limit  for Creatinine is approximately 13% higher for people  identified as -American.         eGFR if non African American 03/17/2022 52 (A) > OR = 60 mL/min/1.73m2 Final    eGFR if  03/17/2022 60  > OR = 60 mL/min/1.73m2 Final    BUN/Creatinine Ratio 03/17/2022 17  6 - 22 (calc) Final    Sodium 03/17/2022 140  135 - 146 mmol/L Final    Potassium 03/17/2022 4.4  3.5 - 5.3 mmol/L Final    Chloride 03/17/2022 105  98 - 110 mmol/L Final    CO2 03/17/2022 27  20  - 32 mmol/L Final    Calcium 03/17/2022 9.7  8.6 - 10.4 mg/dL Final    Total Protein 03/17/2022 6.8  6.1 - 8.1 g/dL Final    Albumin 03/17/2022 4.4  3.6 - 5.1 g/dL Final    Globulin, Total 03/17/2022 2.4  1.9 - 3.7 g/dL (calc) Final    Albumin/Globulin Ratio 03/17/2022 1.8  1.0 - 2.5 (calc) Final    Total Bilirubin 03/17/2022 1.0  0.2 - 1.2 mg/dL Final    Alkaline Phosphatase 03/17/2022 45  37 - 153 U/L Final    AST 03/17/2022 20  10 - 35 U/L Final    ALT 03/17/2022 16  6 - 29 U/L Final    Hemoglobin A1C 03/17/2022 6.1 (A) <5.7 % of total Hgb Final    Comment: For someone without known diabetes, a hemoglobin   A1c value between 5.7% and 6.4% is consistent with  prediabetes and should be confirmed with a   follow-up test.     For someone with known diabetes, a value <7%  indicates that their diabetes is well controlled. A1c  targets should be individualized based on duration of  diabetes, age, comorbid conditions, and other  considerations.     This assay result is consistent with an increased risk  of diabetes.     Currently, no consensus exists regarding use of  hemoglobin A1c for diagnosis of diabetes for children.         Color, UA 03/17/2022 DARK YELLOW  YELLOW Final    Appearance, UA 03/17/2022 CLEAR  CLEAR Final    Specific Gravity, UA 03/17/2022 1.022  1.001 - 1.035 Final    pH, UA 03/17/2022 < OR = 5.0  5.0 - 8.0 Final    Glucose, UA 03/17/2022 NEGATIVE  NEGATIVE Final    Bilirubin, UA 03/17/2022 NEGATIVE  NEGATIVE Final    Ketones, UA 03/17/2022 TRACE (A) NEGATIVE Final    Occult Blood UA 03/17/2022 NEGATIVE  NEGATIVE Final    Protein, UA 03/17/2022 NEGATIVE  NEGATIVE Final    Nitrite, UA 03/17/2022 NEGATIVE  NEGATIVE Final    Leukocytes, UA 03/17/2022 1+ (A) NEGATIVE Final    TSH 03/17/2022 4.98 (A) 0.40 - 4.50 mIU/L Final    Hepatitis C Ab 03/17/2022 NON-REACTIVE  NON-REACTIVE Final    Signal/Cutoff 03/17/2022 0.17  <1.00 Final    Comment:    HCV antibody was non-reactive. There is  no laboratory   evidence of HCV infection.     In most cases, no further action is required. However,  if recent HCV exposure is suspected, a test for HCV RNA  (test code 27528) is suggested.     For additional information please refer to  http://rSmart.Sports.ws/faq/ISH50f6  (This link is being provided for informational/  educational purposes only.)         Cholesterol 03/17/2022 146  <200 mg/dL Final    HDL 03/17/2022 62  > OR = 50 mg/dL Final    Triglycerides 03/17/2022 116  <150 mg/dL Final    LDL Cholesterol 03/17/2022 64  mg/dL (calc) Final    Comment: Reference range: <100     Desirable range <100 mg/dL for primary prevention;    <70 mg/dL for patients with CHD or diabetic patients   with > or = 2 CHD risk factors.     LDL-C is now calculated using the [place]   calculation, which is a validated novel method providing   better accuracy than the Friedewald equation in the   estimation of LDL-C.   Pacheco WALSH et al. MADHURI. 2013;310(19): 9097-9185   (http://education.AdXpose/faq/AGJ311)      HDL/Cholesterol Ratio 03/17/2022 2.4  <5.0 (calc) Final    Non HDL Chol. (LDL+VLDL) 03/17/2022 84  <130 mg/dL (calc) Final    Comment: For patients with diabetes plus 1 major ASCVD risk   factor, treating to a non-HDL-C goal of <100 mg/dL   (LDL-C of <70 mg/dL) is considered a therapeutic   option.      Creatinine, Urine 03/17/2022 190  20 - 275 mg/dL Final    Microalb, Ur 03/17/2022 2.0  See Note: mg/dL Final    Comment: Reference Range:    Reference Range  Not established      Microalb/Creat Ratio 03/17/2022 11  <30 mcg/mg creat Final    Comment:    The ADA defines abnormalities in albumin  excretion as follows:     Albuminuria Category        Result (mcg/mg creatinine)     Normal to Mildly increased   <30  Moderately increased            Severely increased           > OR = 300     The ADA recommends that at least two of three  specimens collected within a 3-6 month period  be  abnormal before considering a patient to be  within a diagnostic category.     ]  Assessment:       1. Type 2 diabetes mellitus without complication, without long-term current use of insulin    2. Pure hypercholesterolemia    3. Essential hypertension    4. Subclinical hypothyroidism    5. Family history of colon cancer in mother    6. Personal history of colonic polyps        Plan:       May was seen today for hypertension and diabetes.    Diagnoses and all orders for this visit:    Type 2 diabetes mellitus without complication, without long-term current use of insulin  -     metFORMIN (GLUMETZA) 1000 MG (MOD) 24hr tablet; Take 1 tablet (1,000 mg total) by mouth daily with breakfast.  -     rosuvastatin (CRESTOR) 10 MG tablet; Take 1 tablet (10 mg total) by mouth once daily.    Pure hypercholesterolemia  -     rosuvastatin (CRESTOR) 10 MG tablet; Take 1 tablet (10 mg total) by mouth once daily.    Essential hypertension  -     losartan (COZAAR) 50 MG tablet; Take 1 tablet (50 mg total) by mouth once daily.    Subclinical hypothyroidism  Comments:  TSH mildly elevated and she denies significant symptoms.  Will monitor for now    Family history of colon cancer in mother  -     Ambulatory referral/consult to Gastroenterology; Future    Personal history of colonic polyps  -     Ambulatory referral/consult to Gastroenterology; Future

## 2022-05-23 ENCOUNTER — TELEPHONE (OUTPATIENT)
Dept: FAMILY MEDICINE | Facility: CLINIC | Age: 72
End: 2022-05-23

## 2022-05-23 RX ORDER — METFORMIN HYDROCHLORIDE 500 MG/1
500 TABLET ORAL 2 TIMES DAILY WITH MEALS
Qty: 180 TABLET | Refills: 1 | Status: SHIPPED | OUTPATIENT
Start: 2022-05-23 | End: 2022-09-19 | Stop reason: SDUPTHER

## 2022-05-23 NOTE — TELEPHONE ENCOUNTER
Pt called and was told by [facility] that the extended release metformin is no longer on her formulary and is requesting non extended release.

## 2022-08-30 DIAGNOSIS — Z12.31 ENCOUNTER FOR SCREENING MAMMOGRAM FOR MALIGNANT NEOPLASM OF BREAST: Primary | ICD-10-CM

## 2022-09-07 ENCOUNTER — TELEPHONE (OUTPATIENT)
Dept: FAMILY MEDICINE | Facility: CLINIC | Age: 72
End: 2022-09-07

## 2022-09-07 DIAGNOSIS — E03.8 SUBCLINICAL HYPOTHYROIDISM: ICD-10-CM

## 2022-09-07 DIAGNOSIS — E11.9 TYPE 2 DIABETES MELLITUS WITHOUT COMPLICATION, WITHOUT LONG-TERM CURRENT USE OF INSULIN: Primary | ICD-10-CM

## 2022-09-13 ENCOUNTER — HOSPITAL ENCOUNTER (OUTPATIENT)
Dept: RADIOLOGY | Facility: HOSPITAL | Age: 72
Discharge: HOME | End: 2022-09-13
Attending: INTERNAL MEDICINE
Payer: MEDICARE

## 2022-09-13 VITALS — HEIGHT: 63 IN | WEIGHT: 175.06 LBS | BODY MASS INDEX: 31.02 KG/M2

## 2022-09-13 DIAGNOSIS — Z12.31 ENCOUNTER FOR SCREENING MAMMOGRAM FOR MALIGNANT NEOPLASM OF BREAST: ICD-10-CM

## 2022-09-13 PROCEDURE — 77063 BREAST TOMOSYNTHESIS BI: CPT | Mod: TC,PO

## 2022-09-15 LAB
ALBUMIN SERPL-MCNC: 4.5 G/DL (ref 3.6–5.1)
ALBUMIN/GLOB SERPL: 1.7 (CALC) (ref 1–2.5)
ALP SERPL-CCNC: 50 U/L (ref 37–153)
ALT SERPL-CCNC: 21 U/L (ref 6–29)
AST SERPL-CCNC: 27 U/L (ref 10–35)
BASOPHILS # BLD AUTO: 53 CELLS/UL (ref 0–200)
BASOPHILS NFR BLD AUTO: 0.7 %
BILIRUB SERPL-MCNC: 1.1 MG/DL (ref 0.2–1.2)
BUN SERPL-MCNC: 21 MG/DL (ref 7–25)
BUN/CREAT SERPL: 20 (CALC) (ref 6–22)
CALCIUM SERPL-MCNC: 10 MG/DL (ref 8.6–10.4)
CHLORIDE SERPL-SCNC: 102 MMOL/L (ref 98–110)
CO2 SERPL-SCNC: 30 MMOL/L (ref 20–32)
CREAT SERPL-MCNC: 1.04 MG/DL (ref 0.6–1)
EGFR: 57 ML/MIN/1.73M2
EOSINOPHIL # BLD AUTO: 705 CELLS/UL (ref 15–500)
EOSINOPHIL NFR BLD AUTO: 9.4 %
ERYTHROCYTE [DISTWIDTH] IN BLOOD BY AUTOMATED COUNT: 13 % (ref 11–15)
GLOBULIN SER CALC-MCNC: 2.6 G/DL (CALC) (ref 1.9–3.7)
GLUCOSE SERPL-MCNC: 122 MG/DL (ref 65–99)
HBA1C MFR BLD: 6.4 % OF TOTAL HGB
HCT VFR BLD AUTO: 36.3 % (ref 35–45)
HGB BLD-MCNC: 12 G/DL (ref 11.7–15.5)
LYMPHOCYTES # BLD AUTO: 2573 CELLS/UL (ref 850–3900)
LYMPHOCYTES NFR BLD AUTO: 34.3 %
MCH RBC QN AUTO: 28.8 PG (ref 27–33)
MCHC RBC AUTO-ENTMCNC: 33.1 G/DL (ref 32–36)
MCV RBC AUTO: 87.1 FL (ref 80–100)
MONOCYTES # BLD AUTO: 645 CELLS/UL (ref 200–950)
MONOCYTES NFR BLD AUTO: 8.6 %
NEUTROPHILS # BLD AUTO: 3525 CELLS/UL (ref 1500–7800)
NEUTROPHILS NFR BLD AUTO: 47 %
PLATELET # BLD AUTO: 232 THOUSAND/UL (ref 140–400)
PMV BLD REES-ECKER: 10.5 FL (ref 7.5–12.5)
POTASSIUM SERPL-SCNC: 4.8 MMOL/L (ref 3.5–5.3)
PROT SERPL-MCNC: 7.1 G/DL (ref 6.1–8.1)
RBC # BLD AUTO: 4.17 MILLION/UL (ref 3.8–5.1)
SODIUM SERPL-SCNC: 138 MMOL/L (ref 135–146)
TSH SERPL-ACNC: 4.47 MIU/L (ref 0.4–4.5)
WBC # BLD AUTO: 7.5 THOUSAND/UL (ref 3.8–10.8)

## 2022-09-19 ENCOUNTER — OFFICE VISIT (OUTPATIENT)
Dept: FAMILY MEDICINE | Facility: CLINIC | Age: 72
End: 2022-09-19
Payer: MEDICARE

## 2022-09-19 VITALS
HEART RATE: 79 BPM | SYSTOLIC BLOOD PRESSURE: 120 MMHG | TEMPERATURE: 98 F | HEIGHT: 63 IN | DIASTOLIC BLOOD PRESSURE: 64 MMHG | BODY MASS INDEX: 31.36 KG/M2 | OXYGEN SATURATION: 96 % | WEIGHT: 177 LBS

## 2022-09-19 DIAGNOSIS — I10 ESSENTIAL HYPERTENSION: ICD-10-CM

## 2022-09-19 DIAGNOSIS — M65.342 TRIGGER RING FINGER OF LEFT HAND: ICD-10-CM

## 2022-09-19 DIAGNOSIS — E78.00 PURE HYPERCHOLESTEROLEMIA: ICD-10-CM

## 2022-09-19 DIAGNOSIS — Z78.0 MENOPAUSE: ICD-10-CM

## 2022-09-19 DIAGNOSIS — E11.9 TYPE 2 DIABETES MELLITUS WITHOUT COMPLICATION, WITHOUT LONG-TERM CURRENT USE OF INSULIN: Primary | ICD-10-CM

## 2022-09-19 PROCEDURE — 3044F PR MOST RECENT HEMOGLOBIN A1C LEVEL <7.0%: ICD-10-PCS | Mod: CPTII,S$GLB,, | Performed by: INTERNAL MEDICINE

## 2022-09-19 PROCEDURE — 99214 PR OFFICE/OUTPT VISIT, EST, LEVL IV, 30-39 MIN: ICD-10-PCS | Mod: S$GLB,,, | Performed by: INTERNAL MEDICINE

## 2022-09-19 PROCEDURE — 1126F AMNT PAIN NOTED NONE PRSNT: CPT | Mod: CPTII,S$GLB,, | Performed by: INTERNAL MEDICINE

## 2022-09-19 PROCEDURE — 3288F FALL RISK ASSESSMENT DOCD: CPT | Mod: CPTII,S$GLB,, | Performed by: INTERNAL MEDICINE

## 2022-09-19 PROCEDURE — 1101F PT FALLS ASSESS-DOCD LE1/YR: CPT | Mod: CPTII,S$GLB,, | Performed by: INTERNAL MEDICINE

## 2022-09-19 PROCEDURE — 3288F PR FALLS RISK ASSESSMENT DOCUMENTED: ICD-10-PCS | Mod: CPTII,S$GLB,, | Performed by: INTERNAL MEDICINE

## 2022-09-19 PROCEDURE — 99214 OFFICE O/P EST MOD 30 MIN: CPT | Mod: S$GLB,,, | Performed by: INTERNAL MEDICINE

## 2022-09-19 PROCEDURE — 3008F BODY MASS INDEX DOCD: CPT | Mod: CPTII,S$GLB,, | Performed by: INTERNAL MEDICINE

## 2022-09-19 PROCEDURE — 3008F PR BODY MASS INDEX (BMI) DOCUMENTED: ICD-10-PCS | Mod: CPTII,S$GLB,, | Performed by: INTERNAL MEDICINE

## 2022-09-19 PROCEDURE — 3074F PR MOST RECENT SYSTOLIC BLOOD PRESSURE < 130 MM HG: ICD-10-PCS | Mod: CPTII,S$GLB,, | Performed by: INTERNAL MEDICINE

## 2022-09-19 PROCEDURE — 3061F PR NEG MICROALBUMINURIA RESULT DOCUMENTED/REVIEW: ICD-10-PCS | Mod: CPTII,S$GLB,, | Performed by: INTERNAL MEDICINE

## 2022-09-19 PROCEDURE — 1126F PR PAIN SEVERITY QUANTIFIED, NO PAIN PRESENT: ICD-10-PCS | Mod: CPTII,S$GLB,, | Performed by: INTERNAL MEDICINE

## 2022-09-19 PROCEDURE — 4010F PR ACE/ARB THEARPY RXD/TAKEN: ICD-10-PCS | Mod: CPTII,S$GLB,, | Performed by: INTERNAL MEDICINE

## 2022-09-19 PROCEDURE — 1101F PR PT FALLS ASSESS DOC 0-1 FALLS W/OUT INJ PAST YR: ICD-10-PCS | Mod: CPTII,S$GLB,, | Performed by: INTERNAL MEDICINE

## 2022-09-19 PROCEDURE — 3078F DIAST BP <80 MM HG: CPT | Mod: CPTII,S$GLB,, | Performed by: INTERNAL MEDICINE

## 2022-09-19 PROCEDURE — 3066F NEPHROPATHY DOC TX: CPT | Mod: CPTII,S$GLB,, | Performed by: INTERNAL MEDICINE

## 2022-09-19 PROCEDURE — 3078F PR MOST RECENT DIASTOLIC BLOOD PRESSURE < 80 MM HG: ICD-10-PCS | Mod: CPTII,S$GLB,, | Performed by: INTERNAL MEDICINE

## 2022-09-19 PROCEDURE — 3061F NEG MICROALBUMINURIA REV: CPT | Mod: CPTII,S$GLB,, | Performed by: INTERNAL MEDICINE

## 2022-09-19 PROCEDURE — 3066F PR DOCUMENTATION OF TREATMENT FOR NEPHROPATHY: ICD-10-PCS | Mod: CPTII,S$GLB,, | Performed by: INTERNAL MEDICINE

## 2022-09-19 PROCEDURE — 4010F ACE/ARB THERAPY RXD/TAKEN: CPT | Mod: CPTII,S$GLB,, | Performed by: INTERNAL MEDICINE

## 2022-09-19 PROCEDURE — 3044F HG A1C LEVEL LT 7.0%: CPT | Mod: CPTII,S$GLB,, | Performed by: INTERNAL MEDICINE

## 2022-09-19 PROCEDURE — 3074F SYST BP LT 130 MM HG: CPT | Mod: CPTII,S$GLB,, | Performed by: INTERNAL MEDICINE

## 2022-09-19 PROCEDURE — 1159F PR MEDICATION LIST DOCUMENTED IN MEDICAL RECORD: ICD-10-PCS | Mod: CPTII,S$GLB,, | Performed by: INTERNAL MEDICINE

## 2022-09-19 PROCEDURE — 1159F MED LIST DOCD IN RCRD: CPT | Mod: CPTII,S$GLB,, | Performed by: INTERNAL MEDICINE

## 2022-09-19 RX ORDER — ROSUVASTATIN CALCIUM 10 MG/1
10 TABLET, COATED ORAL DAILY
Qty: 90 TABLET | Refills: 1 | Status: SHIPPED | OUTPATIENT
Start: 2022-09-19 | End: 2023-04-19 | Stop reason: SDUPTHER

## 2022-09-19 RX ORDER — METFORMIN HYDROCHLORIDE 500 MG/1
500 TABLET ORAL 2 TIMES DAILY WITH MEALS
Qty: 180 TABLET | Refills: 1 | Status: SHIPPED | OUTPATIENT
Start: 2022-09-19 | End: 2023-04-19 | Stop reason: SDUPTHER

## 2022-09-19 RX ORDER — LOSARTAN POTASSIUM 50 MG/1
50 TABLET ORAL DAILY
Qty: 90 TABLET | Refills: 1 | Status: SHIPPED | OUTPATIENT
Start: 2022-09-19 | End: 2023-04-19 | Stop reason: SDUPTHER

## 2022-09-19 NOTE — PROGRESS NOTES
Subjective:       Patient ID: May Solomon is a 71 y.o. female.    Chief Complaint: Hypertension (5 months follow up), Hyperlipidemia, and Diabetes    Here for routine follow up.   She was on vacation recently and not following diet as well as she should; getting back into routine now.    Starting to have issues with trigger finger again; injected by Ortho in the past.      Diabetes  She presents for her follow-up diabetic visit. She has type 2 diabetes mellitus. No MedicAlert identification noted. The initial diagnosis of diabetes was made 18 Years ago. Disease course: up from 6.1% but still well controlled. Pertinent negatives for hypoglycemia include no confusion, dizziness, headaches, hunger, mood changes, nervousness/anxiousness, pallor, seizures, sleepiness, speech difficulty, sweats or tremors. Pertinent negatives for diabetes include no blurred vision, no chest pain, no fatigue, no foot paresthesias, no foot ulcerations, no polydipsia, no polyphagia, no polyuria, no visual change, no weakness and no weight loss. There are no hypoglycemic complications. Pertinent negatives for hypoglycemia complications include no blackouts, no hospitalization, no nocturnal hypoglycemia, no required assistance and no required glucagon injection. Symptoms are stable. Pertinent negatives for diabetic complications include no autonomic neuropathy, CVA, heart disease, nephropathy, peripheral neuropathy, PVD or retinopathy. Risk factors for coronary artery disease include dyslipidemia, obesity, post-menopausal and diabetes mellitus. Current diabetic treatment includes oral agent (monotherapy). She is compliant with treatment all of the time. Her weight is fluctuating minimally. She is following a generally healthy diet. Meal planning includes avoidance of concentrated sweets. She has not had a previous visit with a dietitian. She participates in exercise three times a week. She monitors blood glucose at home 1-2 x per day.  Blood glucose monitoring compliance is excellent. Her home blood glucose trend is fluctuating minimally. Her breakfast blood glucose range is generally 110-130 mg/dl. An ACE inhibitor/angiotensin II receptor blocker is being taken. She does not see a podiatrist.Eye exam is current.   Hypertension  This is a chronic problem. The problem is controlled (usually 120s/80s when she checks it which isn't very often). Pertinent negatives include no anxiety, blurred vision, chest pain, headaches, malaise/fatigue, neck pain, palpitations, peripheral edema, shortness of breath or sweats. Past treatments include angiotensin blockers. There are no compliance problems.  There is no history of CVA, PVD or retinopathy.   Hyperlipidemia  This is a chronic problem. Exacerbating diseases include diabetes. Pertinent negatives include no chest pain, myalgias or shortness of breath. Current antihyperlipidemic treatment includes statins. There are no compliance problems.    Review of Systems   Constitutional:  Negative for activity change, appetite change, chills, diaphoresis, fatigue, fever, malaise/fatigue, unexpected weight change and weight loss.   HENT:  Negative for congestion, ear discharge, ear pain, hearing loss, mouth sores, nosebleeds, postnasal drip, rhinorrhea, sinus pressure, sinus pain, sneezing, sore throat, tinnitus, trouble swallowing and voice change.    Eyes:  Negative for blurred vision, photophobia, pain, discharge, redness, itching and visual disturbance.   Respiratory:  Negative for apnea, cough, choking, chest tightness, shortness of breath and wheezing.    Cardiovascular:  Negative for chest pain, palpitations and leg swelling.   Gastrointestinal:  Negative for abdominal distention, abdominal pain, blood in stool, constipation, diarrhea, nausea and vomiting.   Endocrine: Negative for cold intolerance, heat intolerance, polydipsia, polyphagia and polyuria.   Genitourinary:  Negative for decreased urine volume,  difficulty urinating, dyspareunia, dysuria, enuresis, frequency, genital sores, hematuria, menstrual problem, pelvic pain, urgency, vaginal bleeding, vaginal discharge and vaginal pain.   Musculoskeletal:  Negative for arthralgias, back pain, gait problem, joint swelling, myalgias, neck pain and neck stiffness.   Skin:  Negative for pallor, rash and wound.   Allergic/Immunologic: Negative for environmental allergies, food allergies and immunocompromised state.   Neurological:  Negative for dizziness, tremors, seizures, syncope, facial asymmetry, speech difficulty, weakness, light-headedness, numbness and headaches.   Hematological:  Negative for adenopathy. Does not bruise/bleed easily.   Psychiatric/Behavioral:  Negative for confusion, decreased concentration, hallucinations, self-injury, sleep disturbance and suicidal ideas. The patient is not nervous/anxious.      Past Medical History:   Diagnosis Date    Basal cell carcinoma     Diabetes mellitus, type 2     History of colonic polyps     HTN (hypertension)     Hyperlipidemia       Past Surgical History:   Procedure Laterality Date    BREAST BIOPSY      COLONOSCOPY  05/2017    EYE SURGERY Right 05/2021       Family History   Problem Relation Age of Onset    Colon cancer Mother 76    Hypertension Mother     Obesity Mother     Prostate cancer Father     Breast cancer Neg Hx        Social History     Socioeconomic History    Marital status:    Occupational History    Occupation: retired   Tobacco Use    Smoking status: Never    Smokeless tobacco: Never   Substance and Sexual Activity    Alcohol use: Yes     Comment: occasional    Drug use: Never    Sexual activity: Not Currently   Social History Narrative    Live with son       Current Outpatient Medications   Medication Sig Dispense Refill    calcium carbonate (OS-SANDEEP) 500 mg calcium (1,250 mg) chewable tablet Take 1 tablet by mouth once daily.      multivitamin (THERAGRAN) per tablet Take 1 tablet by mouth  "once daily.      losartan (COZAAR) 50 MG tablet Take 1 tablet (50 mg total) by mouth once daily. 90 tablet 1    metFORMIN (GLUCOPHAGE) 500 MG tablet Take 1 tablet (500 mg total) by mouth 2 (two) times daily with meals. 180 tablet 1    rosuvastatin (CRESTOR) 10 MG tablet Take 1 tablet (10 mg total) by mouth once daily. 90 tablet 1     No current facility-administered medications for this visit.       Review of patient's allergies indicates:   Allergen Reactions    Sulfa (sulfonamide antibiotics) Rash     Objective:    HPI     Hypertension     Additional comments: 5 months follow up          Last edited by Nelson Rosario MA on 9/19/2022  1:20 PM.      Blood pressure 120/64, pulse 79, temperature 98.2 °F (36.8 °C), temperature source Temporal, height 5' 3" (1.6 m), weight 80.3 kg (177 lb), SpO2 96 %. Body mass index is 31.35 kg/m².   Physical Exam  Vitals and nursing note reviewed.   Constitutional:       General: She is not in acute distress.     Appearance: She is well-developed. She is obese. She is not ill-appearing, toxic-appearing or diaphoretic.   HENT:      Head: Normocephalic and atraumatic.   Eyes:      General: No scleral icterus.        Right eye: No discharge.         Left eye: No discharge.      Conjunctiva/sclera: Conjunctivae normal.   Neck:      Vascular: No carotid bruit.   Cardiovascular:      Rate and Rhythm: Normal rate and regular rhythm.      Pulses:           Dorsalis pedis pulses are 2+ on the right side and 2+ on the left side.      Heart sounds: Normal heart sounds. No murmur heard.  Pulmonary:      Effort: Pulmonary effort is normal. No respiratory distress.      Breath sounds: Normal breath sounds. No decreased breath sounds, wheezing, rhonchi or rales.   Abdominal:      General: There is no distension.      Palpations: Abdomen is soft.      Tenderness: There is no abdominal tenderness. There is no guarding or rebound.   Musculoskeletal:      Right lower leg: No edema.      Left lower leg: " No edema.   Feet:      Right foot:      Protective Sensation: 10 sites tested.  10 sites sensed.      Skin integrity: No ulcer, blister, skin breakdown, erythema, warmth, callus or dry skin.      Left foot:      Protective Sensation: 10 sites tested.  10 sites sensed.      Skin integrity: No ulcer, blister, skin breakdown, erythema, warmth, callus or dry skin.   Skin:     General: Skin is warm and dry.   Neurological:      Mental Status: She is alert.      Motor: No tremor.   Psychiatric:         Mood and Affect: Mood normal.         Speech: Speech normal.         Behavior: Behavior normal.         Telephone on 09/07/2022   Component Date Value Ref Range Status    Hemoglobin A1C 09/14/2022 6.4 (H)  <5.7 % of total Hgb Final    Comment: For someone without known diabetes, a hemoglobin   A1c value between 5.7% and 6.4% is consistent with  prediabetes and should be confirmed with a   follow-up test.     For someone with known diabetes, a value <7%  indicates that their diabetes is well controlled. A1c  targets should be individualized based on duration of  diabetes, age, comorbid conditions, and other  considerations.     This assay result is consistent with an increased risk  of diabetes.     Currently, no consensus exists regarding use of  hemoglobin A1c for diagnosis of diabetes for children.         Glucose 09/14/2022 122 (H)  65 - 99 mg/dL Final    Comment:               Fasting reference interval     For someone without known diabetes, a glucose value  between 100 and 125 mg/dL is consistent with  prediabetes and should be confirmed with a  follow-up test.         BUN 09/14/2022 21  7 - 25 mg/dL Final    Creatinine 09/14/2022 1.04 (H)  0.60 - 1.00 mg/dL Final    EGFR 09/14/2022 57 (L)  > OR = 60 mL/min/1.73m2 Final    Comment: The eGFR is based on the CKD-EPI 2021 equation. To calculate   the new eGFR from a previous Creatinine or Cystatin C  result, go to  https://www.kidney.org/professionals/  kdoqi/gfr%5Fcalculator      BUN/Creatinine Ratio 09/14/2022 20  6 - 22 (calc) Final    Sodium 09/14/2022 138  135 - 146 mmol/L Final    Potassium 09/14/2022 4.8  3.5 - 5.3 mmol/L Final    Chloride 09/14/2022 102  98 - 110 mmol/L Final    CO2 09/14/2022 30  20 - 32 mmol/L Final    Calcium 09/14/2022 10.0  8.6 - 10.4 mg/dL Final    Total Protein 09/14/2022 7.1  6.1 - 8.1 g/dL Final    Albumin 09/14/2022 4.5  3.6 - 5.1 g/dL Final    Globulin, Total 09/14/2022 2.6  1.9 - 3.7 g/dL (calc) Final    Albumin/Globulin Ratio 09/14/2022 1.7  1.0 - 2.5 (calc) Final    Total Bilirubin 09/14/2022 1.1  0.2 - 1.2 mg/dL Final    Alkaline Phosphatase 09/14/2022 50  37 - 153 U/L Final    AST 09/14/2022 27  10 - 35 U/L Final    ALT 09/14/2022 21  6 - 29 U/L Final    TSH 09/14/2022 4.47  0.40 - 4.50 mIU/L Final    WBC 09/14/2022 7.5  3.8 - 10.8 Thousand/uL Final    RBC 09/14/2022 4.17  3.80 - 5.10 Million/uL Final    Hemoglobin 09/14/2022 12.0  11.7 - 15.5 g/dL Final    Hematocrit 09/14/2022 36.3  35.0 - 45.0 % Final    MCV 09/14/2022 87.1  80.0 - 100.0 fL Final    MCH 09/14/2022 28.8  27.0 - 33.0 pg Final    MCHC 09/14/2022 33.1  32.0 - 36.0 g/dL Final    RDW 09/14/2022 13.0  11.0 - 15.0 % Final    Platelets 09/14/2022 232  140 - 400 Thousand/uL Final    MPV 09/14/2022 10.5  7.5 - 12.5 fL Final    Neutrophils, Abs 09/14/2022 3,525  1,500 - 7,800 cells/uL Final    Lymph # 09/14/2022 2,573  850 - 3,900 cells/uL Final    Mono # 09/14/2022 645  200 - 950 cells/uL Final    Eos # 09/14/2022 705 (H)  15 - 500 cells/uL Final    Baso # 09/14/2022 53  0 - 200 cells/uL Final    Neutrophils Relative 09/14/2022 47  % Final    Lymph % 09/14/2022 34.3  % Final    Mono % 09/14/2022 8.6  % Final    Eosinophil % 09/14/2022 9.4  % Final    Basophil % 09/14/2022 0.7  % Final   ]  Assessment:       1. Type 2 diabetes mellitus without complication, without long-term current use of insulin    2. Pure hypercholesterolemia     3. Essential hypertension    4. Menopause    5. Trigger ring finger of left hand        Plan:       May was seen today for hypertension, hyperlipidemia and diabetes.    Diagnoses and all orders for this visit:    Type 2 diabetes mellitus without complication, without long-term current use of insulin  -     rosuvastatin (CRESTOR) 10 MG tablet; Take 1 tablet (10 mg total) by mouth once daily.  -     metFORMIN (GLUCOPHAGE) 500 MG tablet; Take 1 tablet (500 mg total) by mouth 2 (two) times daily with meals.    Pure hypercholesterolemia  -     rosuvastatin (CRESTOR) 10 MG tablet; Take 1 tablet (10 mg total) by mouth once daily.    Essential hypertension  -     losartan (COZAAR) 50 MG tablet; Take 1 tablet (50 mg total) by mouth once daily.    Menopause  -     DXA Bone Density Spine And Hip; Future    Trigger ring finger of left hand  Comments:  Follow up with Ortho

## 2022-09-27 ENCOUNTER — OFFICE VISIT (OUTPATIENT)
Dept: ORTHOPEDICS | Facility: CLINIC | Age: 72
End: 2022-09-27
Payer: MEDICARE

## 2022-09-27 VITALS — WEIGHT: 177 LBS | BODY MASS INDEX: 31.36 KG/M2 | HEIGHT: 63 IN

## 2022-09-27 DIAGNOSIS — M65.342 TRIGGER RING FINGER OF LEFT HAND: Primary | ICD-10-CM

## 2022-09-27 PROCEDURE — 3288F FALL RISK ASSESSMENT DOCD: CPT | Mod: CPTII,S$GLB,, | Performed by: ORTHOPAEDIC SURGERY

## 2022-09-27 PROCEDURE — 3008F BODY MASS INDEX DOCD: CPT | Mod: CPTII,S$GLB,, | Performed by: ORTHOPAEDIC SURGERY

## 2022-09-27 PROCEDURE — 3061F NEG MICROALBUMINURIA REV: CPT | Mod: CPTII,S$GLB,, | Performed by: ORTHOPAEDIC SURGERY

## 2022-09-27 PROCEDURE — 3061F PR NEG MICROALBUMINURIA RESULT DOCUMENTED/REVIEW: ICD-10-PCS | Mod: CPTII,S$GLB,, | Performed by: ORTHOPAEDIC SURGERY

## 2022-09-27 PROCEDURE — 4010F PR ACE/ARB THEARPY RXD/TAKEN: ICD-10-PCS | Mod: CPTII,S$GLB,, | Performed by: ORTHOPAEDIC SURGERY

## 2022-09-27 PROCEDURE — 3008F PR BODY MASS INDEX (BMI) DOCUMENTED: ICD-10-PCS | Mod: CPTII,S$GLB,, | Performed by: ORTHOPAEDIC SURGERY

## 2022-09-27 PROCEDURE — 4010F ACE/ARB THERAPY RXD/TAKEN: CPT | Mod: CPTII,S$GLB,, | Performed by: ORTHOPAEDIC SURGERY

## 2022-09-27 PROCEDURE — 1159F PR MEDICATION LIST DOCUMENTED IN MEDICAL RECORD: ICD-10-PCS | Mod: CPTII,S$GLB,, | Performed by: ORTHOPAEDIC SURGERY

## 2022-09-27 PROCEDURE — 1160F PR REVIEW ALL MEDS BY PRESCRIBER/CLIN PHARMACIST DOCUMENTED: ICD-10-PCS | Mod: CPTII,S$GLB,, | Performed by: ORTHOPAEDIC SURGERY

## 2022-09-27 PROCEDURE — 1126F AMNT PAIN NOTED NONE PRSNT: CPT | Mod: CPTII,S$GLB,, | Performed by: ORTHOPAEDIC SURGERY

## 2022-09-27 PROCEDURE — 3044F PR MOST RECENT HEMOGLOBIN A1C LEVEL <7.0%: ICD-10-PCS | Mod: CPTII,S$GLB,, | Performed by: ORTHOPAEDIC SURGERY

## 2022-09-27 PROCEDURE — 1126F PR PAIN SEVERITY QUANTIFIED, NO PAIN PRESENT: ICD-10-PCS | Mod: CPTII,S$GLB,, | Performed by: ORTHOPAEDIC SURGERY

## 2022-09-27 PROCEDURE — 3066F PR DOCUMENTATION OF TREATMENT FOR NEPHROPATHY: ICD-10-PCS | Mod: CPTII,S$GLB,, | Performed by: ORTHOPAEDIC SURGERY

## 2022-09-27 PROCEDURE — 1101F PT FALLS ASSESS-DOCD LE1/YR: CPT | Mod: CPTII,S$GLB,, | Performed by: ORTHOPAEDIC SURGERY

## 2022-09-27 PROCEDURE — 3288F PR FALLS RISK ASSESSMENT DOCUMENTED: ICD-10-PCS | Mod: CPTII,S$GLB,, | Performed by: ORTHOPAEDIC SURGERY

## 2022-09-27 PROCEDURE — 1160F RVW MEDS BY RX/DR IN RCRD: CPT | Mod: CPTII,S$GLB,, | Performed by: ORTHOPAEDIC SURGERY

## 2022-09-27 PROCEDURE — 3066F NEPHROPATHY DOC TX: CPT | Mod: CPTII,S$GLB,, | Performed by: ORTHOPAEDIC SURGERY

## 2022-09-27 PROCEDURE — 1159F MED LIST DOCD IN RCRD: CPT | Mod: CPTII,S$GLB,, | Performed by: ORTHOPAEDIC SURGERY

## 2022-09-27 PROCEDURE — 99213 PR OFFICE/OUTPT VISIT, EST, LEVL III, 20-29 MIN: ICD-10-PCS | Mod: S$GLB,,, | Performed by: ORTHOPAEDIC SURGERY

## 2022-09-27 PROCEDURE — 1101F PR PT FALLS ASSESS DOC 0-1 FALLS W/OUT INJ PAST YR: ICD-10-PCS | Mod: CPTII,S$GLB,, | Performed by: ORTHOPAEDIC SURGERY

## 2022-09-27 PROCEDURE — 3044F HG A1C LEVEL LT 7.0%: CPT | Mod: CPTII,S$GLB,, | Performed by: ORTHOPAEDIC SURGERY

## 2022-09-27 PROCEDURE — 99213 OFFICE O/P EST LOW 20 MIN: CPT | Mod: S$GLB,,, | Performed by: ORTHOPAEDIC SURGERY

## 2022-09-27 NOTE — PROGRESS NOTES
[facility] ORTHOPEDICS    Subjective:     Chief Complaint:   Chief Complaint   Patient presents with    Left Hand - Pain     Left ring finger triggering. Recd inj 11/02/21 which offered relief. Here today requesting inj today       Past Medical History:   Diagnosis Date    Basal cell carcinoma     Diabetes mellitus, type 2     History of colonic polyps     HTN (hypertension)     Hyperlipidemia        Past Surgical History:   Procedure Laterality Date    BREAST BIOPSY      COLONOSCOPY  05/2017    EYE SURGERY Right 05/2021       Current Outpatient Medications   Medication Sig    calcium carbonate (OS-SANDEEP) 500 mg calcium (1,250 mg) chewable tablet Take 1 tablet by mouth once daily.    losartan (COZAAR) 50 MG tablet Take 1 tablet (50 mg total) by mouth once daily.    metFORMIN (GLUCOPHAGE) 500 MG tablet Take 1 tablet (500 mg total) by mouth 2 (two) times daily with meals.    multivitamin (THERAGRAN) per tablet Take 1 tablet by mouth once daily.    rosuvastatin (CRESTOR) 10 MG tablet Take 1 tablet (10 mg total) by mouth once daily.     No current facility-administered medications for this visit.       Review of patient's allergies indicates:   Allergen Reactions    Sulfa (sulfonamide antibiotics) Rash       Family History   Problem Relation Age of Onset    Colon cancer Mother 76    Hypertension Mother     Obesity Mother     Prostate cancer Father     Breast cancer Neg Hx        Social History     Socioeconomic History    Marital status:    Occupational History    Occupation: retired   Tobacco Use    Smoking status: Never    Smokeless tobacco: Never   Substance and Sexual Activity    Alcohol use: Yes     Comment: occasional    Drug use: Never    Sexual activity: Not Currently   Social History Narrative    Live with son       History of present illness:  72-year-old female returns to clinic today after almost a year since we evaluated her left hand for a left ring finger trigger finger which we injected at that  time.  Pain returns last month or 2 with some triggering.  She is left handed, this bothers her significantly.      Review of Systems:    Constitution: Negative for chills, fever, and sweats.  Negative for unexplained weight loss.    HENT:  Negative for headaches and blurry vision.    Cardiovascular:Negative for chest pain or irregular heart beat. Negative for hypertension.    Respiratory:  Negative for cough and shortness of breath.    Gastrointestinal: Negative for abdominal pain, heartburn, melena, nausea, and vomitting.    Genitourinary:  Negative bladder incontinence and dysuria.    Musculoskeletal:  See HPI for details.     Neurological: Negative for numbness.    Psychiatric/Behavioral: Negative for depression.  The patient is not nervous/anxious.      Endocrine: Negative for polyuria    Hematologic/Lymphatic: Negative for bleeding problem.  Does not bruise/bleed easily.    Skin: Negative for poor would healing and rash    Objective:      Physical Examination:    Vital Signs:  There were no vitals filed for this visit.    Body mass index is 31.35 kg/m².    This a well-developed, well nourished patient in no acute distress.  They are alert and oriented and cooperative to examination.        Examination of the left hand, skin is dry and intact, no erythema or ecchymosis, no signs symptoms of infection.  No significant joint hypertrophy to suggest advanced osteoarthritic changes.  She has full range of motion is able to close the hand and make a fist.  Strength is symmetrical in the bilateral hands.  She does have tenderness over the A1 pulley of the left ring finger.  There is palpable clicking noted.    Pertinent New Results:    XRAY Report / Interpretation:   AP lateral oblique views of the left hand taken today in the office demonstrate no significant osseous or soft tissue abnormalities.    Assessment/Plan:      Trigger finger left ring finger, plan for trigger finger release.  Risks and benefits of  "procedure discussed in detail with the patient.  She wishes to proceed.  Bleeding infection need for additional surgery, pain, degenerative injury.    Emma Chandra, Physician Assistant, served in the capacity as a "scribe" for this patient encounter.  A "face-to-face" encounter occurred with Dr. Sachin Tong on this date.  The treatment plan and medical decision-making is outlined above. Patient was seen and examined with a chaperone.       This note was created using Dragon voice recognition software that occasionally misinterpreted phrases or words.          "

## 2022-09-27 NOTE — H&P (VIEW-ONLY)
[facility] ORTHOPEDICS    Subjective:     Chief Complaint:   Chief Complaint   Patient presents with    Left Hand - Pain     Left ring finger triggering. Recd inj 11/02/21 which offered relief. Here today requesting inj today       Past Medical History:   Diagnosis Date    Basal cell carcinoma     Diabetes mellitus, type 2     History of colonic polyps     HTN (hypertension)     Hyperlipidemia        Past Surgical History:   Procedure Laterality Date    BREAST BIOPSY      COLONOSCOPY  05/2017    EYE SURGERY Right 05/2021       Current Outpatient Medications   Medication Sig    calcium carbonate (OS-SANDEEP) 500 mg calcium (1,250 mg) chewable tablet Take 1 tablet by mouth once daily.    losartan (COZAAR) 50 MG tablet Take 1 tablet (50 mg total) by mouth once daily.    metFORMIN (GLUCOPHAGE) 500 MG tablet Take 1 tablet (500 mg total) by mouth 2 (two) times daily with meals.    multivitamin (THERAGRAN) per tablet Take 1 tablet by mouth once daily.    rosuvastatin (CRESTOR) 10 MG tablet Take 1 tablet (10 mg total) by mouth once daily.     No current facility-administered medications for this visit.       Review of patient's allergies indicates:   Allergen Reactions    Sulfa (sulfonamide antibiotics) Rash       Family History   Problem Relation Age of Onset    Colon cancer Mother 76    Hypertension Mother     Obesity Mother     Prostate cancer Father     Breast cancer Neg Hx        Social History     Socioeconomic History    Marital status:    Occupational History    Occupation: retired   Tobacco Use    Smoking status: Never    Smokeless tobacco: Never   Substance and Sexual Activity    Alcohol use: Yes     Comment: occasional    Drug use: Never    Sexual activity: Not Currently   Social History Narrative    Live with son       History of present illness:  72-year-old female returns to clinic today after almost a year since we evaluated her left hand for a left ring finger trigger finger which we injected at that  time.  Pain returns last month or 2 with some triggering.  She is left handed, this bothers her significantly.      Review of Systems:    Constitution: Negative for chills, fever, and sweats.  Negative for unexplained weight loss.    HENT:  Negative for headaches and blurry vision.    Cardiovascular:Negative for chest pain or irregular heart beat. Negative for hypertension.    Respiratory:  Negative for cough and shortness of breath.    Gastrointestinal: Negative for abdominal pain, heartburn, melena, nausea, and vomitting.    Genitourinary:  Negative bladder incontinence and dysuria.    Musculoskeletal:  See HPI for details.     Neurological: Negative for numbness.    Psychiatric/Behavioral: Negative for depression.  The patient is not nervous/anxious.      Endocrine: Negative for polyuria    Hematologic/Lymphatic: Negative for bleeding problem.  Does not bruise/bleed easily.    Skin: Negative for poor would healing and rash    Objective:      Physical Examination:    Vital Signs:  There were no vitals filed for this visit.    Body mass index is 31.35 kg/m².    This a well-developed, well nourished patient in no acute distress.  They are alert and oriented and cooperative to examination.        Examination of the left hand, skin is dry and intact, no erythema or ecchymosis, no signs symptoms of infection.  No significant joint hypertrophy to suggest advanced osteoarthritic changes.  She has full range of motion is able to close the hand and make a fist.  Strength is symmetrical in the bilateral hands.  She does have tenderness over the A1 pulley of the left ring finger.  There is palpable clicking noted.    Pertinent New Results:    XRAY Report / Interpretation:   AP lateral oblique views of the left hand taken today in the office demonstrate no significant osseous or soft tissue abnormalities.    Assessment/Plan:      Trigger finger left ring finger, plan for trigger finger release.  Risks and benefits of  "procedure discussed in detail with the patient.  She wishes to proceed.  Bleeding infection need for additional surgery, pain, degenerative injury.    Emma Chandra, Physician Assistant, served in the capacity as a "scribe" for this patient encounter.  A "face-to-face" encounter occurred with Dr. Sachin Tong on this date.  The treatment plan and medical decision-making is outlined above. Patient was seen and examined with a chaperone.       This note was created using Dragon voice recognition software that occasionally misinterpreted phrases or words.          "

## 2022-10-04 ENCOUNTER — HOSPITAL ENCOUNTER (OUTPATIENT)
Dept: RADIOLOGY | Facility: HOSPITAL | Age: 72
Discharge: HOME | End: 2022-10-04
Attending: INTERNAL MEDICINE
Payer: MEDICARE

## 2022-10-04 DIAGNOSIS — M65.342 TRIGGER RING FINGER OF LEFT HAND: Primary | ICD-10-CM

## 2022-10-04 DIAGNOSIS — Z78.0 MENOPAUSE: ICD-10-CM

## 2022-10-04 DIAGNOSIS — Z01.818 PRE-OPERATIVE EXAM: ICD-10-CM

## 2022-10-04 PROCEDURE — 77080 DXA BONE DENSITY AXIAL: CPT | Mod: TC,PO

## 2022-10-19 ENCOUNTER — PATIENT MESSAGE (OUTPATIENT)
Dept: ADMINISTRATIVE | Facility: OTHER | Age: 72
End: 2022-10-19

## 2022-10-24 ENCOUNTER — HOSPITAL ENCOUNTER (OUTPATIENT)
Dept: RADIOLOGY | Facility: HOSPITAL | Age: 72
Discharge: HOME | End: 2022-10-24
Attending: ORTHOPAEDIC SURGERY
Payer: MEDICARE

## 2022-10-24 ENCOUNTER — HOSPITAL ENCOUNTER (OUTPATIENT)
Dept: PREADMISSION TESTING | Facility: HOSPITAL | Age: 72
Discharge: HOME | End: 2022-10-24
Attending: ORTHOPAEDIC SURGERY
Payer: MEDICARE

## 2022-10-24 VITALS
BODY MASS INDEX: 31.2 KG/M2 | TEMPERATURE: 99 F | SYSTOLIC BLOOD PRESSURE: 152 MMHG | WEIGHT: 176.06 LBS | RESPIRATION RATE: 16 BRPM | HEART RATE: 96 BPM | HEIGHT: 63 IN | DIASTOLIC BLOOD PRESSURE: 91 MMHG | OXYGEN SATURATION: 96 %

## 2022-10-24 DIAGNOSIS — M65.342 TRIGGER RING FINGER OF LEFT HAND: ICD-10-CM

## 2022-10-24 PROCEDURE — 71046 X-RAY EXAM CHEST 2 VIEWS: CPT | Mod: TC

## 2022-10-24 PROCEDURE — 93005 ELECTROCARDIOGRAM TRACING: CPT | Performed by: INTERNAL MEDICINE

## 2022-10-24 PROCEDURE — 93010 EKG 12-LEAD: ICD-10-PCS | Mod: ,,, | Performed by: INTERNAL MEDICINE

## 2022-10-24 PROCEDURE — 93010 ELECTROCARDIOGRAM REPORT: CPT | Mod: ,,, | Performed by: INTERNAL MEDICINE

## 2022-10-24 NOTE — DISCHARGE INSTRUCTIONS
INSTRUCTIONS  To confirm your doctor has scheduled your surgery for:    COVID TESTING SCHEDULED:     Morning of surgery please check in with registration near Parking Garage Entrance then proceed to Outpatient Surgery Department.    Preop nurses will call the afternoon prior to surgery between 4:00 and 6:00 PM with your final arrival time.  PLEASE NOTE:  The surgery schedule has many variables which may affect the time of your surgery case. Family members should be available if your surgery time changes. Plan to be here the day of your procedure between 4-6 hours.    TAKE ONLY THESE MEDICATIONS WITH A SMALL SIP OF WATER THE MORNING OF SURGERY: see list    DO NOT TAKE THESE MEDICATIONS 5-7 DAYS PRIOR to your procedure per your surgeon's request: ASPIRIN, ALEVE, BC powder, KARINA SELTZER, IBUPROFEN, FISH OIL, VITAMIN E, OR HERBALS   (May take Tylenol)     INSTRUCTIONS IMPORTANT!!  Do not eat or drink anything after midnight.  ONLY if you are diabetic, check your sugar in the morning before your procedure.  Do not smoke, vape or drink alcoholic beverages 24 hours prior to your procedure.  Shower the night before AND the morning of your procedure with a Chlorhexidine wash such as hibiclens or Dial antibacterial soap from neck down. You may use your own shampoo and face wash. This helps your skin to be as bacteria free as possible.  If you wear contact lenses, dentures, hearing aids or glasses, bring a container to put them in and give to a family member.    Please leave all jewelry, piercings and valuables at home.  DO NOT remove hair from the surgery site.   If your condition changes such as fever, cough, etc, please notify your surgeon.   Make arrangements in advance for transportation home by a responsible adult.  You must make arrangements for transportation, TAXI'S, UBER'S OR LYFTS ARE NOT ALLOWED.        If you have any questions about these instructions, call Pre-Op Admit  Nursing at [phone] or the Pre-Op Day  Surgery Unit at [phone].

## 2022-10-26 ENCOUNTER — ANESTHESIA (OUTPATIENT)
Dept: SURGERY | Facility: HOSPITAL | Age: 72
End: 2022-10-26
Payer: MEDICARE

## 2022-10-26 ENCOUNTER — ANESTHESIA EVENT (OUTPATIENT)
Dept: SURGERY | Facility: HOSPITAL | Age: 72
End: 2022-10-26
Payer: MEDICARE

## 2022-10-26 ENCOUNTER — HOSPITAL ENCOUNTER (OUTPATIENT)
Facility: HOSPITAL | Age: 72
Discharge: HOME | End: 2022-10-26
Attending: ORTHOPAEDIC SURGERY | Admitting: ORTHOPAEDIC SURGERY
Payer: MEDICARE

## 2022-10-26 VITALS
SYSTOLIC BLOOD PRESSURE: 138 MMHG | WEIGHT: 175 LBS | BODY MASS INDEX: 31.01 KG/M2 | HEART RATE: 71 BPM | RESPIRATION RATE: 16 BRPM | DIASTOLIC BLOOD PRESSURE: 80 MMHG | HEIGHT: 63 IN | TEMPERATURE: 98 F | OXYGEN SATURATION: 96 %

## 2022-10-26 DIAGNOSIS — M65.342 TRIGGER RING FINGER OF LEFT HAND: Primary | ICD-10-CM

## 2022-10-26 LAB
GLUCOSE SERPL-MCNC: 127 MG/DL (ref 70–110)
GLUCOSE SERPL-MCNC: 139 MG/DL (ref 70–110)

## 2022-10-26 PROCEDURE — 37000009 HC ANESTHESIA EA ADD 15 MINS: Performed by: ORTHOPAEDIC SURGERY

## 2022-10-26 PROCEDURE — 37000008 HC ANESTHESIA 1ST 15 MINUTES: Performed by: ORTHOPAEDIC SURGERY

## 2022-10-26 PROCEDURE — 36000707: Performed by: ORTHOPAEDIC SURGERY

## 2022-10-26 PROCEDURE — 71000033 HC RECOVERY, INTIAL HOUR: Performed by: ORTHOPAEDIC SURGERY

## 2022-10-26 PROCEDURE — 63600175 PHARM REV CODE 636 W HCPCS: Performed by: NURSE ANESTHETIST, CERTIFIED REGISTERED

## 2022-10-26 PROCEDURE — 36000706: Performed by: ORTHOPAEDIC SURGERY

## 2022-10-26 PROCEDURE — 82962 GLUCOSE BLOOD TEST: CPT | Performed by: ORTHOPAEDIC SURGERY

## 2022-10-26 PROCEDURE — 27201423 OPTIME MED/SURG SUP & DEVICES STERILE SUPPLY: Performed by: ORTHOPAEDIC SURGERY

## 2022-10-26 PROCEDURE — 25000003 PHARM REV CODE 250: Performed by: NURSE ANESTHETIST, CERTIFIED REGISTERED

## 2022-10-26 PROCEDURE — 27000673 HC TUBING BLOOD Y: Performed by: ANESTHESIOLOGY

## 2022-10-26 PROCEDURE — 27000654 HC CATH IV JELCO: Performed by: ANESTHESIOLOGY

## 2022-10-26 PROCEDURE — 71000016 HC POSTOP RECOV ADDL HR: Performed by: ORTHOPAEDIC SURGERY

## 2022-10-26 PROCEDURE — 27000671 HC TUBING MICROBORE EXT: Performed by: ANESTHESIOLOGY

## 2022-10-26 PROCEDURE — 27000284 HC CANNULA NASAL: Performed by: ANESTHESIOLOGY

## 2022-10-26 PROCEDURE — 71000015 HC POSTOP RECOV 1ST HR: Performed by: ORTHOPAEDIC SURGERY

## 2022-10-26 RX ORDER — OXYCODONE AND ACETAMINOPHEN 7.5; 325 MG/1; MG/1
1 TABLET ORAL EVERY 4 HOURS PRN
Qty: 14 TABLET | Refills: 0 | Status: SHIPPED | OUTPATIENT
Start: 2022-10-26 | End: 2023-04-19

## 2022-10-26 RX ORDER — MIDAZOLAM HYDROCHLORIDE 1 MG/ML
INJECTION INTRAMUSCULAR; INTRAVENOUS
Status: DISCONTINUED | OUTPATIENT
Start: 2022-10-26 | End: 2022-10-26

## 2022-10-26 RX ORDER — DIPHENHYDRAMINE HYDROCHLORIDE 50 MG/ML
12.5 INJECTION INTRAMUSCULAR; INTRAVENOUS
Status: DISCONTINUED | OUTPATIENT
Start: 2022-10-26 | End: 2022-10-26 | Stop reason: HOSPADM

## 2022-10-26 RX ORDER — ONDANSETRON 2 MG/ML
INJECTION INTRAMUSCULAR; INTRAVENOUS
Status: DISCONTINUED | OUTPATIENT
Start: 2022-10-26 | End: 2022-10-26

## 2022-10-26 RX ORDER — MEPERIDINE HYDROCHLORIDE 50 MG/ML
12.5 INJECTION INTRAMUSCULAR; INTRAVENOUS; SUBCUTANEOUS EVERY 10 MIN PRN
Status: DISCONTINUED | OUTPATIENT
Start: 2022-10-26 | End: 2022-10-26 | Stop reason: HOSPADM

## 2022-10-26 RX ORDER — ONDANSETRON 2 MG/ML
4 INJECTION INTRAMUSCULAR; INTRAVENOUS DAILY PRN
Status: DISCONTINUED | OUTPATIENT
Start: 2022-10-26 | End: 2022-10-26 | Stop reason: HOSPADM

## 2022-10-26 RX ORDER — OXYCODONE HYDROCHLORIDE 5 MG/1
5 TABLET ORAL EVERY 6 HOURS PRN
Status: DISCONTINUED | OUTPATIENT
Start: 2022-10-26 | End: 2022-10-26

## 2022-10-26 RX ORDER — FENTANYL CITRATE 50 UG/ML
INJECTION, SOLUTION INTRAMUSCULAR; INTRAVENOUS
Status: DISCONTINUED | OUTPATIENT
Start: 2022-10-26 | End: 2022-10-26

## 2022-10-26 RX ORDER — PROPOFOL 10 MG/ML
INJECTION, EMULSION INTRAVENOUS
Status: DISCONTINUED | OUTPATIENT
Start: 2022-10-26 | End: 2022-10-26

## 2022-10-26 RX ORDER — LIDOCAINE HYDROCHLORIDE 5 MG/ML
INJECTION, SOLUTION INFILTRATION; INTRAVENOUS
Status: DISCONTINUED | OUTPATIENT
Start: 2022-10-26 | End: 2022-10-26

## 2022-10-26 RX ORDER — SODIUM CHLORIDE, SODIUM LACTATE, POTASSIUM CHLORIDE, CALCIUM CHLORIDE 600; 310; 30; 20 MG/100ML; MG/100ML; MG/100ML; MG/100ML
INJECTION, SOLUTION INTRAVENOUS CONTINUOUS PRN
Status: DISCONTINUED | OUTPATIENT
Start: 2022-10-26 | End: 2022-10-26

## 2022-10-26 RX ORDER — HYDROMORPHONE HYDROCHLORIDE 1 MG/ML
0.2 INJECTION, SOLUTION INTRAMUSCULAR; INTRAVENOUS; SUBCUTANEOUS EVERY 5 MIN PRN
Status: DISCONTINUED | OUTPATIENT
Start: 2022-10-26 | End: 2022-10-26 | Stop reason: HOSPADM

## 2022-10-26 RX ORDER — OXYCODONE HYDROCHLORIDE 5 MG/1
5 TABLET ORAL
Status: DISCONTINUED | OUTPATIENT
Start: 2022-10-26 | End: 2022-10-26 | Stop reason: HOSPADM

## 2022-10-26 RX ORDER — CEFAZOLIN SODIUM 2 G/50ML
2 SOLUTION INTRAVENOUS
Status: DISCONTINUED | OUTPATIENT
Start: 2022-10-26 | End: 2022-10-26 | Stop reason: HOSPADM

## 2022-10-26 RX ORDER — CEFAZOLIN SODIUM 1 G/3ML
INJECTION, POWDER, FOR SOLUTION INTRAMUSCULAR; INTRAVENOUS
Status: DISCONTINUED | OUTPATIENT
Start: 2022-10-26 | End: 2022-10-26

## 2022-10-26 RX ADMIN — SODIUM CHLORIDE, SODIUM LACTATE, POTASSIUM CHLORIDE, AND CALCIUM CHLORIDE: .6; .31; .03; .02 INJECTION, SOLUTION INTRAVENOUS at 09:10

## 2022-10-26 RX ADMIN — ONDANSETRON 4 MG: 2 INJECTION INTRAMUSCULAR; INTRAVENOUS at 09:10

## 2022-10-26 RX ADMIN — FENTANYL CITRATE 50 MCG: 50 INJECTION INTRAMUSCULAR; INTRAVENOUS at 09:10

## 2022-10-26 RX ADMIN — CEFAZOLIN 2 G: 330 INJECTION, POWDER, FOR SOLUTION INTRAMUSCULAR; INTRAVENOUS at 09:10

## 2022-10-26 RX ADMIN — PROPOFOL 50 MG: 10 INJECTION, EMULSION INTRAVENOUS at 09:10

## 2022-10-26 RX ADMIN — MIDAZOLAM HYDROCHLORIDE 1 MG: 1 INJECTION, SOLUTION INTRAMUSCULAR; INTRAVENOUS at 09:10

## 2022-10-26 RX ADMIN — LIDOCAINE HYDROCHLORIDE 40 ML: 5 INJECTION, SOLUTION INFILTRATION; INTRAVENOUS at 09:10

## 2022-10-26 NOTE — TRANSFER OF CARE
"Anesthesia Transfer of Care Note    Patient: May Solomon    Procedure(s) Performed: Procedure(s) (LRB):  RELEASE, TRIGGER FINGER (RING) (Left)    Patient location: PACU    Anesthesia Type: general    Transport from OR: Transported from OR on room air with adequate spontaneous ventilation    Post pain: adequate analgesia    Post assessment: no apparent anesthetic complications    Post vital signs: stable    Level of consciousness: awake and alert    Nausea/Vomiting: no nausea/vomiting    Complications: none    Transfer of care protocol was followed      Last vitals:   Visit Vitals  BP (!) 173/105 (BP Location: Right arm, Patient Position: Sitting)   Pulse 90   Temp 37 °C (98.6 °F) (Oral)   Resp 18   Ht 5' 3" (1.6 m)   Wt 79.4 kg (175 lb)   SpO2 96%   Breastfeeding No   BMI 31.00 kg/m²     "

## 2022-10-26 NOTE — OP NOTE
[facility]  Surgery Department  Operative Note    SUMMARY     Date of Procedure: 10/26/2022     Procedure:  Trigger finger release left ring    Surgeon(s) and Role:     * Sachin Tong MD - Primary    Assisting Surgeon:  ngoc    Pre-Operative Diagnosis: Trigger ring finger of left hand [M65.342]    Post-Operative Diagnosis: Post-Op Diagnosis Codes:     * Trigger ring finger of left hand [M65.342]    Anesthesia: Local MAC      Description of the Findings of the Procedure:  Patient was placed on the operating table in supine position.  The left hand was prepped and draped in the usual sterile manner for surgery.  Incision was made in line with the ring finger over the A1 pulley.  It was carried down sharply through skin.  The A1 pulley was identified and released with Littler scissors.  We copiously irrigated.  The finger no longer triggered.  We closed the skin with 4 0 nylon sutures.  Sterile dressings were applied and the patient was noted to be in stable condition    Complications: No    Estimated Blood Loss (EBL): * No values recorded between 10/26/2022  9:31 AM and 10/26/2022  9:34 AM *           Implants: * No implants in log *    Specimens:   Specimen (24h ago, onward)      None                    Condition: Good    Disposition: PACU - hemodynamically stable.                Discharge Note    SUMMARY     Admit Date: 10/26/2022    Discharge Date and Time:  10/26/2022 9:34 AM    Hospital Course (synopsis of major diagnoses, care, treatment, and services provided during the course of the hospital stay): Uneventful      Final Diagnosis: Post-Op Diagnosis Codes:     * Trigger ring finger of left hand [M65.342]    Disposition: Home or Self Care    Follow Up/Patient Instructions:     Medications:  Reconciled Home Medications:   Current Discharge Medication List        CONTINUE these medications which have NOT CHANGED    Details   calcium carbonate (OS-SANDEEP) 500 mg calcium (1,250 mg) chewable tablet  Take 1 tablet by mouth once daily.      losartan (COZAAR) 50 MG tablet Take 1 tablet (50 mg total) by mouth once daily.  Qty: 90 tablet, Refills: 1    Comments: .  Associated Diagnoses: Essential hypertension      metFORMIN (GLUCOPHAGE) 500 MG tablet Take 1 tablet (500 mg total) by mouth 2 (two) times daily with meals.  Qty: 180 tablet, Refills: 1    Associated Diagnoses: Type 2 diabetes mellitus without complication, without long-term current use of insulin      multivitamin (THERAGRAN) per tablet Take 1 tablet by mouth once daily.      rosuvastatin (CRESTOR) 10 MG tablet Take 1 tablet (10 mg total) by mouth once daily.  Qty: 90 tablet, Refills: 1    Associated Diagnoses: Type 2 diabetes mellitus without complication, without long-term current use of insulin; Pure hypercholesterolemia           Discharge Procedure Orders   Diet Adult Regular     Leave dressing on - Keep it clean, dry, and intact until clinic visit     Activity as tolerated

## 2022-10-26 NOTE — ANESTHESIA PROCEDURE NOTES
Peripheral Block    Patient location during procedure: OR    Reason for block: primary anesthetic    Diagnosis: L cts   Start time: 10/26/2022 9:18 AM  Timeout: 10/26/2022 9:18 AM   End time: 10/26/2022 9:23 AM    Staffing  Authorizing Provider: Vamshi Merida MD  Performing Provider: Vamshi Merida MD    Preanesthetic Checklist  Completed: patient identified, IV checked, site marked, risks and benefits discussed, surgical consent, monitors and equipment checked, pre-op evaluation and timeout performed  Peripheral Block  Patient position: supine  Patient monitoring: heart rate, cardiac monitor, continuous pulse ox, continuous capnometry and frequent blood pressure checks  Block type: [place] block  Laterality: left  Injection technique: single shot  Needle  Needle localization: anatomical landmarks     Assessment  Paresthesia pain: none  Heart rate change: no  Slow fractionated injection: yes  Pain Tolerance: comfortable throughout block and no complaints      Additional Notes  [place] block  Patient in on standard monitors and oxygen via nasal cannula. Consent confirmed and timeout performed. Patient with previously placed 22-gauge Jelco in dorsum of left hand. Tourniquet applied to upper arm and extremity exsanguinated with Esmarch and then tourniquet inflated to 250 mmHg.  50 ML's of 0.5% preservative-free lidocaine injected easily via 22-gauge Jelco in dorsum of left hand.  No swelling or discomfort with injection. Patient tolerated the procedure well and was hemodynamically stable throughout.

## 2022-10-26 NOTE — ANESTHESIA POSTPROCEDURE EVALUATION
Anesthesia Post Evaluation    Patient: May Solomon    Procedure(s) Performed: Procedure(s) (LRB):  RELEASE, TRIGGER FINGER (RING) (Left)    Final Anesthesia Type: regional      Patient location during evaluation: PACU  Patient participation: Yes- Able to Participate  Level of consciousness: awake and alert and oriented  Post-procedure vital signs: reviewed and stable  Pain management: adequate  Airway patency: patent    PONV status at discharge: No PONV  Anesthetic complications: no      Cardiovascular status: blood pressure returned to baseline and hemodynamically stable  Respiratory status: unassisted, spontaneous ventilation and room air  Hydration status: euvolemic  Follow-up not needed.          Vitals Value Taken Time   /65 10/26/22 1015   Temp 36.3 °C (97.4 °F) 10/26/22 1015   Pulse 64 10/26/22 1017   Resp 11 10/26/22 1017   SpO2 95 % 10/26/22 1017   Vitals shown include unvalidated device data.      No case tracking events are documented in the log.      Pain/Blessing Score: Blessing Score: 9 (10/26/2022 10:15 AM)

## 2022-10-26 NOTE — ANESTHESIA PREPROCEDURE EVALUATION
10/26/2022  May Solomon is a 72 y.o., female.      Patient Active Problem List   Diagnosis    HTN (hypertension)    History of colonic polyps    Diabetes mellitus, type 2    Hyperlipidemia    Atrophic vulva    Menopausal symptom    Obesity with body mass index 30 or greater    Osteopenia       Past Surgical History:   Procedure Laterality Date    BREAST BIOPSY      COLONOSCOPY  05/2017    EYE SURGERY Right 05/2021        Tobacco Use:  The patient  reports that she has never smoked. She has never used smokeless tobacco.     Results for orders placed or performed during the hospital encounter of 10/24/22   EKG 12-lead    Collection Time: 10/24/22  1:48 PM    Narrative    Test Reason :     Vent. Rate : 097 BPM     Atrial Rate : 097 BPM     P-R Int : 174 ms          QRS Dur : 078 ms      QT Int : 344 ms       P-R-T Axes : 052 003 077 degrees     QTc Int : 436 ms    Normal sinus rhythm  Minimal voltage criteria for LVH, may be normal variant ( R in aVL )  Borderline Abnormal ECG  When compared with ECG of 24-OCT-2022 13:46,  No significant change was found    Referred By: CHAD MG           Confirmed By:              Lab Results   Component Value Date    WBC 6.55 10/24/2022    HGB 12.5 10/24/2022    HCT 38.8 10/24/2022    MCV 89 10/24/2022     10/24/2022     BMP  Lab Results   Component Value Date     10/24/2022    K 4.8 10/24/2022     10/24/2022    CO2 26 10/24/2022    BUN 21 10/24/2022    CREATININE 1.1 10/24/2022    CALCIUM 10.3 10/24/2022    ANIONGAP 10 10/24/2022     (H) 10/24/2022     (H) 09/14/2022     (H) 03/17/2022       No results found for this or any previous visit.          Pre-op Assessment    I have reviewed the Patient Summary Reports.     I have reviewed the Nursing Notes. I have reviewed the NPO Status.   I have reviewed the  Medications.     Review of Systems  Anesthesia Hx:  No problems with previous Anesthesia Denies Hx of Anesthetic complications  Denies Family Hx of Anesthesia complications.   Denies Personal Hx of Anesthesia complications.   Social:  Non-Smoker    Hematology/Oncology:  Hematology Normal   Oncology Normal     EENT/Dental:EENT/Dental Normal   Cardiovascular:   Hypertension hyperlipidemia ECG has been reviewed.    Pulmonary:  Pulmonary Normal    Renal/:  Renal/ Normal     Hepatic/GI:  Hepatic/GI Normal    Musculoskeletal:  Musculoskeletal Normal    Neurological:  Neurology Normal    Endocrine:   Diabetes, type 2  Obesity / BMI > 30  Psych:  Psychiatric Normal           Physical Exam  General: Well nourished, Cooperative, Alert and Oriented    Airway:  Mallampati: II   Mouth Opening: Normal  TM Distance: Normal  Tongue: Normal  Neck ROM: Normal ROM    Dental:  Intact    Chest/Lungs:  Clear to auscultation    Heart:  Rate: Normal  Rhythm: Regular Rhythm  Sounds: Normal        Anesthesia Plan  Type of Anesthesia, risks & benefits discussed:    Anesthesia Type: MAC, Regional  Intra-op Monitoring Plan: Standard ASA Monitors  Post Op Pain Control Plan: multimodal analgesia  Informed Consent: Informed consent signed with the Patient and all parties understand the risks and agree with anesthesia plan.  All questions answered.   ASA Score: 2  Anesthesia Plan Notes: Osmar Block    Ready For Surgery From Anesthesia Perspective.     .

## 2022-10-27 ENCOUNTER — TELEPHONE (OUTPATIENT)
Dept: ORTHOPEDICS | Facility: CLINIC | Age: 72
End: 2022-10-27

## 2022-10-27 NOTE — TELEPHONE ENCOUNTER
Spoke with patient is doing fine after surgery yesterday 10.26.22, her pain is controled she is taking pain medication every 6 to 8 hours

## 2022-11-09 ENCOUNTER — OFFICE VISIT (OUTPATIENT)
Dept: ORTHOPEDICS | Facility: CLINIC | Age: 72
End: 2022-11-09
Payer: MEDICARE

## 2022-11-09 VITALS
DIASTOLIC BLOOD PRESSURE: 80 MMHG | WEIGHT: 175 LBS | BODY MASS INDEX: 31.01 KG/M2 | SYSTOLIC BLOOD PRESSURE: 144 MMHG | HEIGHT: 63 IN

## 2022-11-09 DIAGNOSIS — Z98.890 S/P TRIGGER FINGER RELEASE: Primary | ICD-10-CM

## 2022-11-09 PROCEDURE — 1125F PR PAIN SEVERITY QUANTIFIED, PAIN PRESENT: ICD-10-PCS | Mod: CPTII,S$GLB,, | Performed by: PHYSICIAN ASSISTANT

## 2022-11-09 PROCEDURE — 3044F PR MOST RECENT HEMOGLOBIN A1C LEVEL <7.0%: ICD-10-PCS | Mod: CPTII,S$GLB,, | Performed by: PHYSICIAN ASSISTANT

## 2022-11-09 PROCEDURE — 4010F PR ACE/ARB THEARPY RXD/TAKEN: ICD-10-PCS | Mod: CPTII,S$GLB,, | Performed by: PHYSICIAN ASSISTANT

## 2022-11-09 PROCEDURE — 3044F HG A1C LEVEL LT 7.0%: CPT | Mod: CPTII,S$GLB,, | Performed by: PHYSICIAN ASSISTANT

## 2022-11-09 PROCEDURE — 3008F BODY MASS INDEX DOCD: CPT | Mod: CPTII,S$GLB,, | Performed by: PHYSICIAN ASSISTANT

## 2022-11-09 PROCEDURE — 4010F ACE/ARB THERAPY RXD/TAKEN: CPT | Mod: CPTII,S$GLB,, | Performed by: PHYSICIAN ASSISTANT

## 2022-11-09 PROCEDURE — 3079F PR MOST RECENT DIASTOLIC BLOOD PRESSURE 80-89 MM HG: ICD-10-PCS | Mod: CPTII,S$GLB,, | Performed by: PHYSICIAN ASSISTANT

## 2022-11-09 PROCEDURE — 1160F PR REVIEW ALL MEDS BY PRESCRIBER/CLIN PHARMACIST DOCUMENTED: ICD-10-PCS | Mod: CPTII,S$GLB,, | Performed by: PHYSICIAN ASSISTANT

## 2022-11-09 PROCEDURE — 1160F RVW MEDS BY RX/DR IN RCRD: CPT | Mod: CPTII,S$GLB,, | Performed by: PHYSICIAN ASSISTANT

## 2022-11-09 PROCEDURE — 3061F PR NEG MICROALBUMINURIA RESULT DOCUMENTED/REVIEW: ICD-10-PCS | Mod: CPTII,S$GLB,, | Performed by: PHYSICIAN ASSISTANT

## 2022-11-09 PROCEDURE — 3066F NEPHROPATHY DOC TX: CPT | Mod: CPTII,S$GLB,, | Performed by: PHYSICIAN ASSISTANT

## 2022-11-09 PROCEDURE — 1125F AMNT PAIN NOTED PAIN PRSNT: CPT | Mod: CPTII,S$GLB,, | Performed by: PHYSICIAN ASSISTANT

## 2022-11-09 PROCEDURE — 3066F PR DOCUMENTATION OF TREATMENT FOR NEPHROPATHY: ICD-10-PCS | Mod: CPTII,S$GLB,, | Performed by: PHYSICIAN ASSISTANT

## 2022-11-09 PROCEDURE — 3008F PR BODY MASS INDEX (BMI) DOCUMENTED: ICD-10-PCS | Mod: CPTII,S$GLB,, | Performed by: PHYSICIAN ASSISTANT

## 2022-11-09 PROCEDURE — 3077F SYST BP >= 140 MM HG: CPT | Mod: CPTII,S$GLB,, | Performed by: PHYSICIAN ASSISTANT

## 2022-11-09 PROCEDURE — 1159F PR MEDICATION LIST DOCUMENTED IN MEDICAL RECORD: ICD-10-PCS | Mod: CPTII,S$GLB,, | Performed by: PHYSICIAN ASSISTANT

## 2022-11-09 PROCEDURE — 3288F PR FALLS RISK ASSESSMENT DOCUMENTED: ICD-10-PCS | Mod: CPTII,S$GLB,, | Performed by: PHYSICIAN ASSISTANT

## 2022-11-09 PROCEDURE — 3077F PR MOST RECENT SYSTOLIC BLOOD PRESSURE >= 140 MM HG: ICD-10-PCS | Mod: CPTII,S$GLB,, | Performed by: PHYSICIAN ASSISTANT

## 2022-11-09 PROCEDURE — 99024 POSTOP FOLLOW-UP VISIT: CPT | Mod: S$GLB,,, | Performed by: PHYSICIAN ASSISTANT

## 2022-11-09 PROCEDURE — 1159F MED LIST DOCD IN RCRD: CPT | Mod: CPTII,S$GLB,, | Performed by: PHYSICIAN ASSISTANT

## 2022-11-09 PROCEDURE — 3288F FALL RISK ASSESSMENT DOCD: CPT | Mod: CPTII,S$GLB,, | Performed by: PHYSICIAN ASSISTANT

## 2022-11-09 PROCEDURE — 1101F PR PT FALLS ASSESS DOC 0-1 FALLS W/OUT INJ PAST YR: ICD-10-PCS | Mod: CPTII,S$GLB,, | Performed by: PHYSICIAN ASSISTANT

## 2022-11-09 PROCEDURE — 3061F NEG MICROALBUMINURIA REV: CPT | Mod: CPTII,S$GLB,, | Performed by: PHYSICIAN ASSISTANT

## 2022-11-09 PROCEDURE — 99024 PR POST-OP FOLLOW-UP VISIT: ICD-10-PCS | Mod: S$GLB,,, | Performed by: PHYSICIAN ASSISTANT

## 2022-11-09 PROCEDURE — 3079F DIAST BP 80-89 MM HG: CPT | Mod: CPTII,S$GLB,, | Performed by: PHYSICIAN ASSISTANT

## 2022-11-09 PROCEDURE — 1101F PT FALLS ASSESS-DOCD LE1/YR: CPT | Mod: CPTII,S$GLB,, | Performed by: PHYSICIAN ASSISTANT

## 2022-11-09 NOTE — PROGRESS NOTES
[facility] ORTHOPED[facility]  [address]  [place] [address]  Phone: [phone]   [phone]    Patient's PCP:Irineo Freeman Jr, MD  Referring Provider: No ref. provider found    POST-OP Note:    Subjective:        Chief Complaint:   Chief Complaint   Patient presents with    Left Hand - Post-op Evaluation     Patient is here for a PO f/up, Left hand Ring Finger Trigger Release 10/26/22 & Suture removal. Still hurts but not like it did       Past Medical History:   Diagnosis Date    Basal cell carcinoma     Diabetes mellitus, type 2     History of colonic polyps     HTN (hypertension)     Hyperlipidemia        Past Surgical History:   Procedure Laterality Date    BREAST BIOPSY      COLONOSCOPY  05/2017    EYE SURGERY Right 05/2021    TRIGGER FINGER RELEASE Left 10/26/2022    Procedure: RELEASE, TRIGGER FINGER;  Surgeon: Sachin Tong MD;  Location: [facility];  Service: Orthopedics;  Laterality: Left;       Current Outpatient Medications   Medication Sig    calcium carbonate (OS-SANDEEP) 500 mg calcium (1,250 mg) chewable tablet Take 1 tablet by mouth once daily.    losartan (COZAAR) 50 MG tablet Take 1 tablet (50 mg total) by mouth once daily.    metFORMIN (GLUCOPHAGE) 500 MG tablet Take 1 tablet (500 mg total) by mouth 2 (two) times daily with meals.    multivitamin (THERAGRAN) per tablet Take 1 tablet by mouth once daily.    oxyCODONE-acetaminophen (PERCOCET) 7.5-325 mg per tablet Take 1 tablet by mouth every 4 (four) hours as needed for Pain.    rosuvastatin (CRESTOR) 10 MG tablet Take 1 tablet (10 mg total) by mouth once daily.     No current facility-administered medications for this visit.       Review of patient's allergies indicates:   Allergen Reactions    Sulfa (sulfonamide antibiotics) Rash       Family History   Problem Relation Age of Onset    Colon cancer Mother 76    Hypertension Mother     Obesity Mother     Prostate cancer Father     Breast cancer Neg Hx        Social History      Socioeconomic History    Marital status:    Occupational History    Occupation: retired   Tobacco Use    Smoking status: Never    Smokeless tobacco: Never   Substance and Sexual Activity    Alcohol use: Yes     Comment: occasional    Drug use: Never    Sexual activity: Not Currently   Social History Narrative    Live with son       History of present illness:  72-year-old female returns to clinic today status post left ring finger, trigger finger release.  Pain is much improved.  The locking has subsided as well.  She does have some difficulty with making a fist.      Review of Systems:    Musculoskeletal:  See HPI       Objective:        Physical Examination:    Vital Signs:   Vitals:    11/09/22 0902   BP: (!) 144/80       Body mass index is 31 kg/m².    This a well-developed, well nourished patient in no acute distress.  They are alert and oriented and cooperative to examination.        Examination of the left hand, the patient has her surgical dressing intact.  This was removed today.  Her surgical site is well healed.  To mattress sutures are noted.  These were removed today.  No evidence of wound failure dehiscence or infection.  No erythema or ecchymosis.  No signs symptoms of infection.  Patient does have limited flexion secondary to pain.  No observable triggering is noted.  Passively I am able to close the finger completely.    Pertinent New Results:     XRAY Report / Interpretation:        Assessment:       1. S/P trigger finger release      Plan:     S/P trigger finger release  Comments:  Left hand Ring Finger  Orders:  -     Ambulatory referral/consult to Physical/Occupational Therapy; Future; Expected date: 11/16/2022        Follow up in about 6 weeks (around 12/21/2022) for PO 6 week f/up Left hand Trigger finger release & PT Status.    Stable status post left ring finger trigger finger release.  Patient have a little difficulty with range of motion.  I suspect this will subside now that  she is out of her surgical dressing and can resume normal motion.  I will send her to physical therapy for an evaluation and some treatment.  We will see how she responds.  I will check her back in 4 weeks.  We discussed wound care.  Gentle scrubbing mild soap water pat dry.  She can apply her lotion of choice to help with desensitization.      Brian Chandra, SCOTTS, PA-C    This note was created using [employer] voice recognition software that occasionally misinterprets words or phrases.

## 2022-11-09 NOTE — PROGRESS NOTES
[facility] ORTHOPED[facility]  [address]  [place] [address]  Phone: [phone]   [phone]    Patient's PCP:Irineo Freeman Jr, MD  Referring Provider: No ref. provider found    POST-OP Note:    Subjective:        Chief Complaint:   Chief Complaint   Patient presents with    Left Hand - Post-op Evaluation     Patient is here for a PO f/up, Left hand Ring Finger Trigger Release 10/26/22 & Suture removal. Still hurts but not like it did       Past Medical History:   Diagnosis Date    Basal cell carcinoma     Diabetes mellitus, type 2     History of colonic polyps     HTN (hypertension)     Hyperlipidemia        Past Surgical History:   Procedure Laterality Date    BREAST BIOPSY      COLONOSCOPY  05/2017    EYE SURGERY Right 05/2021    TRIGGER FINGER RELEASE Left 10/26/2022    Procedure: RELEASE, TRIGGER FINGER;  Surgeon: Sachin Tong MD;  Location: [facility];  Service: Orthopedics;  Laterality: Left;       Current Outpatient Medications   Medication Sig    calcium carbonate (OS-SANDEEP) 500 mg calcium (1,250 mg) chewable tablet Take 1 tablet by mouth once daily.    losartan (COZAAR) 50 MG tablet Take 1 tablet (50 mg total) by mouth once daily.    metFORMIN (GLUCOPHAGE) 500 MG tablet Take 1 tablet (500 mg total) by mouth 2 (two) times daily with meals.    multivitamin (THERAGRAN) per tablet Take 1 tablet by mouth once daily.    oxyCODONE-acetaminophen (PERCOCET) 7.5-325 mg per tablet Take 1 tablet by mouth every 4 (four) hours as needed for Pain.    rosuvastatin (CRESTOR) 10 MG tablet Take 1 tablet (10 mg total) by mouth once daily.     No current facility-administered medications for this visit.       Review of patient's allergies indicates:   Allergen Reactions    Sulfa (sulfonamide antibiotics) Rash       Family History   Problem Relation Age of Onset    Colon cancer Mother 76    Hypertension Mother     Obesity Mother     Prostate cancer Father     Breast cancer Neg Hx        Social History      Socioeconomic History    Marital status:    Occupational History    Occupation: retired   Tobacco Use    Smoking status: Never    Smokeless tobacco: Never   Substance and Sexual Activity    Alcohol use: Yes     Comment: occasional    Drug use: Never    Sexual activity: Not Currently   Social History Narrative    Live with son       History of present illness:  72-year-old female follows up in clinic today for her left hand.  She had a left ring finger trigger finger release done on October 26.  She presents to clinic today with her surgical dressing in place. At no new or persistent of the ring finger.      Review of Systems:    Musculoskeletal:  See HPI       Objective:        Physical Examination:    Vital Signs:   Vitals:    11/09/22 0902   BP: (!) 144/80       Body mass index is 31 kg/m².    This a well-developed, well nourished patient in no acute distress.  They are alert and oriented and cooperative to examination.        Examination of the left hand, palmar aspect ring finger well-healed surgical incision.  Two mattress sutures were placed.  These were removed today. No signs symptoms infection.  No erythema or ecchymosis.  Patient with some pain with flexion but again no palpable triggering.    Pertinent New Results:     XRAY Report / Interpretation:        Assessment:       1. S/P trigger finger release      Plan:     S/P trigger finger release  Comments:  Left hand Ring Finger  Orders:  -     Ambulatory referral/consult to Physical/Occupational Therapy; Future; Expected date: 11/16/2022      Follow up in about 6 weeks (around 12/21/2022) for PO 6 week f/up Left hand Trigger finger release & PT Status.    72-year-old female stable status post left trigger finger release of the ring finger.  She has a little stiffness.  No pain. We will send her to some therapy.  She may not need a lot just get it moving again. Check her back in 4 weeks.    [unfilled]  PHU Rasheed PA-C    This  note was created using [employer] voice recognition software that occasionally misinterprets words or phrases.

## 2022-11-28 ENCOUNTER — PATIENT MESSAGE (OUTPATIENT)
Dept: ADMINISTRATIVE | Facility: OTHER | Age: 72
End: 2022-11-28

## 2022-12-22 ENCOUNTER — OFFICE VISIT (OUTPATIENT)
Dept: ORTHOPEDICS | Facility: CLINIC | Age: 72
End: 2022-12-22
Payer: MEDICARE

## 2022-12-22 VITALS — WEIGHT: 175 LBS | BODY MASS INDEX: 31.01 KG/M2 | HEIGHT: 63 IN

## 2022-12-22 DIAGNOSIS — Z98.890 S/P TRIGGER FINGER RELEASE: Primary | ICD-10-CM

## 2022-12-22 PROCEDURE — 4010F PR ACE/ARB THEARPY RXD/TAKEN: ICD-10-PCS | Mod: CPTII,S$GLB,, | Performed by: PHYSICIAN ASSISTANT

## 2022-12-22 PROCEDURE — 3044F PR MOST RECENT HEMOGLOBIN A1C LEVEL <7.0%: ICD-10-PCS | Mod: CPTII,S$GLB,, | Performed by: PHYSICIAN ASSISTANT

## 2022-12-22 PROCEDURE — 3066F NEPHROPATHY DOC TX: CPT | Mod: CPTII,S$GLB,, | Performed by: PHYSICIAN ASSISTANT

## 2022-12-22 PROCEDURE — 1101F PT FALLS ASSESS-DOCD LE1/YR: CPT | Mod: CPTII,S$GLB,, | Performed by: PHYSICIAN ASSISTANT

## 2022-12-22 PROCEDURE — 1159F MED LIST DOCD IN RCRD: CPT | Mod: CPTII,S$GLB,, | Performed by: PHYSICIAN ASSISTANT

## 2022-12-22 PROCEDURE — 3044F HG A1C LEVEL LT 7.0%: CPT | Mod: CPTII,S$GLB,, | Performed by: PHYSICIAN ASSISTANT

## 2022-12-22 PROCEDURE — 4010F ACE/ARB THERAPY RXD/TAKEN: CPT | Mod: CPTII,S$GLB,, | Performed by: PHYSICIAN ASSISTANT

## 2022-12-22 PROCEDURE — 3061F PR NEG MICROALBUMINURIA RESULT DOCUMENTED/REVIEW: ICD-10-PCS | Mod: CPTII,S$GLB,, | Performed by: PHYSICIAN ASSISTANT

## 2022-12-22 PROCEDURE — 3288F FALL RISK ASSESSMENT DOCD: CPT | Mod: CPTII,S$GLB,, | Performed by: PHYSICIAN ASSISTANT

## 2022-12-22 PROCEDURE — 3061F NEG MICROALBUMINURIA REV: CPT | Mod: CPTII,S$GLB,, | Performed by: PHYSICIAN ASSISTANT

## 2022-12-22 PROCEDURE — 99024 PR POST-OP FOLLOW-UP VISIT: ICD-10-PCS | Mod: S$GLB,POP,, | Performed by: PHYSICIAN ASSISTANT

## 2022-12-22 PROCEDURE — 3066F PR DOCUMENTATION OF TREATMENT FOR NEPHROPATHY: ICD-10-PCS | Mod: CPTII,S$GLB,, | Performed by: PHYSICIAN ASSISTANT

## 2022-12-22 PROCEDURE — 1159F PR MEDICATION LIST DOCUMENTED IN MEDICAL RECORD: ICD-10-PCS | Mod: CPTII,S$GLB,, | Performed by: PHYSICIAN ASSISTANT

## 2022-12-22 PROCEDURE — 3008F BODY MASS INDEX DOCD: CPT | Mod: CPTII,S$GLB,, | Performed by: PHYSICIAN ASSISTANT

## 2022-12-22 PROCEDURE — 99024 POSTOP FOLLOW-UP VISIT: CPT | Mod: S$GLB,POP,, | Performed by: PHYSICIAN ASSISTANT

## 2022-12-22 PROCEDURE — 3288F PR FALLS RISK ASSESSMENT DOCUMENTED: ICD-10-PCS | Mod: CPTII,S$GLB,, | Performed by: PHYSICIAN ASSISTANT

## 2022-12-22 PROCEDURE — 3008F PR BODY MASS INDEX (BMI) DOCUMENTED: ICD-10-PCS | Mod: CPTII,S$GLB,, | Performed by: PHYSICIAN ASSISTANT

## 2022-12-22 PROCEDURE — 1101F PR PT FALLS ASSESS DOC 0-1 FALLS W/OUT INJ PAST YR: ICD-10-PCS | Mod: CPTII,S$GLB,, | Performed by: PHYSICIAN ASSISTANT

## 2022-12-22 NOTE — PROGRESS NOTES
[facility] ORTHOPED[facility]  [address]  [place] [address]  Phone: [phone]   [phone]    Patient's PCP:Irineo Freeman Jr, MD  Referring Provider: No ref. provider found    POST-OP Note:    Subjective:        Chief Complaint:   Chief Complaint   Patient presents with    Left Hand - Post-op Evaluation     S/p left ring trigger release on 10/26/22, doing good, no pain, therapy is completed        Past Medical History:   Diagnosis Date    Basal cell carcinoma     Diabetes mellitus, type 2     History of colonic polyps     HTN (hypertension)     Hyperlipidemia        Past Surgical History:   Procedure Laterality Date    BREAST BIOPSY      COLONOSCOPY  05/2017    EYE SURGERY Right 05/2021    TRIGGER FINGER RELEASE Left 10/26/2022    Procedure: RELEASE, TRIGGER FINGER;  Surgeon: Sachin Tong MD;  Location: [facility];  Service: Orthopedics;  Laterality: Left;       Current Outpatient Medications   Medication Sig    calcium carbonate (OS-SANDEEP) 500 mg calcium (1,250 mg) chewable tablet Take 1 tablet by mouth once daily.    losartan (COZAAR) 50 MG tablet Take 1 tablet (50 mg total) by mouth once daily.    metFORMIN (GLUCOPHAGE) 500 MG tablet Take 1 tablet (500 mg total) by mouth 2 (two) times daily with meals.    multivitamin (THERAGRAN) per tablet Take 1 tablet by mouth once daily.    oxyCODONE-acetaminophen (PERCOCET) 7.5-325 mg per tablet Take 1 tablet by mouth every 4 (four) hours as needed for Pain.    rosuvastatin (CRESTOR) 10 MG tablet Take 1 tablet (10 mg total) by mouth once daily.     No current facility-administered medications for this visit.       Review of patient's allergies indicates:   Allergen Reactions    Sulfa (sulfonamide antibiotics) Rash       Family History   Problem Relation Age of Onset    Colon cancer Mother 76    Hypertension Mother     Obesity Mother     Prostate cancer Father     Breast cancer Neg Hx        Social History     Socioeconomic History    Marital status:     Occupational History    Occupation: retired   Tobacco Use    Smoking status: Never    Smokeless tobacco: Never   Substance and Sexual Activity    Alcohol use: Yes     Comment: occasional    Drug use: Never    Sexual activity: Not Currently   Social History Narrative    Live with son       History of present illness: 72-year-old female returns to clinic today status post left ring finger, trigger finger release.  Pain is much improved.  The locking has subsided as well.  She was having some difficulty with making a fist.      Review of Systems:    Musculoskeletal:  See HPI       Objective:        Physical Examination:    Vital Signs: There were no vitals filed for this visit.    Body mass index is 31 kg/m².    This a well-developed, well nourished patient in no acute distress.  They are alert and oriented and cooperative to examination.        Examination of the left hand, the surgical site at the base of the left ring finger is well healed.  She has attended physical therapy, she has excellent range of motion she can make a complete closed fist.  She has no pain.  She is able to resume her normal activities.    Pertinent New Results:     XRAY Report / Interpretation:        Assessment:       1. S/P trigger finger release      Plan:     S/P trigger finger release        Follow up if symptoms worsen or fail to improve.    Stable status post left ring finger trigger finger release.  She is doing well.  The initial stiffness and soreness and difficulty with range of motion is all resolved with physical therapy.  She can discontinue therapy, normal use of the left hand and follow-up with us p.r.n..      Brian Chandra, PHU, PA-C    This note was created using [employer] voice recognition software that occasionally misinterprets words or phrases.

## 2023-03-28 ENCOUNTER — TELEPHONE (OUTPATIENT)
Dept: FAMILY MEDICINE | Facility: CLINIC | Age: 73
End: 2023-03-28

## 2023-03-28 DIAGNOSIS — E11.9 TYPE 2 DIABETES MELLITUS WITHOUT COMPLICATION, WITHOUT LONG-TERM CURRENT USE OF INSULIN: Primary | ICD-10-CM

## 2023-03-28 DIAGNOSIS — E03.8 SUBCLINICAL HYPOTHYROIDISM: ICD-10-CM

## 2023-03-28 NOTE — TELEPHONE ENCOUNTER
----- Message from Valorie Yung LPN sent at 3/28/2023 10:47 AM CDT -----  Regarding: FW: lab orders    ----- Message -----  From: Bridget Cordon  Sent: 3/28/2023  10:46 AM CDT  To: Irineo Freeman Staff  Subject: lab orders                                       Pt needs lab orders sent to Elia nickerson

## 2023-04-18 LAB
ALBUMIN SERPL-MCNC: 4.7 G/DL (ref 3.6–5.1)
ALBUMIN/CREAT UR: 14 MCG/MG CREAT
ALBUMIN/GLOB SERPL: 1.7 (CALC) (ref 1–2.5)
ALP SERPL-CCNC: 54 U/L (ref 37–153)
ALT SERPL-CCNC: 31 U/L (ref 6–29)
APPEARANCE UR: CLEAR
AST SERPL-CCNC: 40 U/L (ref 10–35)
BILIRUB SERPL-MCNC: 1.3 MG/DL (ref 0.2–1.2)
BILIRUB UR QL STRIP: NEGATIVE
BUN SERPL-MCNC: 23 MG/DL (ref 7–25)
BUN/CREAT SERPL: 21 (CALC) (ref 6–22)
CALCIUM SERPL-MCNC: 10.3 MG/DL (ref 8.6–10.4)
CHLORIDE SERPL-SCNC: 102 MMOL/L (ref 98–110)
CHOLEST SERPL-MCNC: 174 MG/DL
CHOLEST/HDLC SERPL: 2.9 (CALC)
CO2 SERPL-SCNC: 29 MMOL/L (ref 20–32)
COLOR UR: ABNORMAL
CREAT SERPL-MCNC: 1.11 MG/DL (ref 0.6–1)
CREAT UR-MCNC: 211 MG/DL (ref 20–275)
EGFR: 52 ML/MIN/1.73M2
GLOBULIN SER CALC-MCNC: 2.7 G/DL (CALC) (ref 1.9–3.7)
GLUCOSE SERPL-MCNC: 124 MG/DL (ref 65–99)
GLUCOSE UR QL STRIP: NEGATIVE
HBA1C MFR BLD: 6.3 % OF TOTAL HGB
HDLC SERPL-MCNC: 61 MG/DL
HGB UR QL STRIP: NEGATIVE
KETONES UR QL STRIP: NEGATIVE
LDLC SERPL CALC-MCNC: 87 MG/DL (CALC)
LEUKOCYTE ESTERASE UR QL STRIP: ABNORMAL
MICROALBUMIN UR-MCNC: 3 MG/DL
NITRITE UR QL STRIP: NEGATIVE
NONHDLC SERPL-MCNC: 113 MG/DL (CALC)
PH UR STRIP: ABNORMAL [PH] (ref 5–8)
POTASSIUM SERPL-SCNC: 5.1 MMOL/L (ref 3.5–5.3)
PROT SERPL-MCNC: 7.4 G/DL (ref 6.1–8.1)
PROT UR QL STRIP: ABNORMAL
SODIUM SERPL-SCNC: 140 MMOL/L (ref 135–146)
SP GR UR STRIP: 1.02 (ref 1–1.03)
TRIGL SERPL-MCNC: 166 MG/DL
TSH SERPL-ACNC: 2.48 MIU/L (ref 0.4–4.5)

## 2023-04-19 ENCOUNTER — OFFICE VISIT (OUTPATIENT)
Dept: FAMILY MEDICINE | Facility: CLINIC | Age: 73
End: 2023-04-19
Payer: MEDICARE

## 2023-04-19 VITALS
BODY MASS INDEX: 30.51 KG/M2 | OXYGEN SATURATION: 97 % | HEART RATE: 78 BPM | HEIGHT: 63 IN | WEIGHT: 172.19 LBS | DIASTOLIC BLOOD PRESSURE: 62 MMHG | TEMPERATURE: 99 F | SYSTOLIC BLOOD PRESSURE: 120 MMHG

## 2023-04-19 DIAGNOSIS — N18.31 STAGE 3A CHRONIC KIDNEY DISEASE: ICD-10-CM

## 2023-04-19 DIAGNOSIS — I10 ESSENTIAL HYPERTENSION: ICD-10-CM

## 2023-04-19 DIAGNOSIS — N18.31 TYPE 2 DIABETES MELLITUS WITH STAGE 3A CHRONIC KIDNEY DISEASE, WITHOUT LONG-TERM CURRENT USE OF INSULIN: Primary | ICD-10-CM

## 2023-04-19 DIAGNOSIS — E11.22 TYPE 2 DIABETES MELLITUS WITH STAGE 3A CHRONIC KIDNEY DISEASE, WITHOUT LONG-TERM CURRENT USE OF INSULIN: Primary | ICD-10-CM

## 2023-04-19 DIAGNOSIS — E78.00 PURE HYPERCHOLESTEROLEMIA: ICD-10-CM

## 2023-04-19 PROCEDURE — 3044F PR MOST RECENT HEMOGLOBIN A1C LEVEL <7.0%: ICD-10-PCS | Mod: CPTII,S$GLB,, | Performed by: INTERNAL MEDICINE

## 2023-04-19 PROCEDURE — 3288F FALL RISK ASSESSMENT DOCD: CPT | Mod: CPTII,S$GLB,, | Performed by: INTERNAL MEDICINE

## 2023-04-19 PROCEDURE — 99214 OFFICE O/P EST MOD 30 MIN: CPT | Mod: S$GLB,,, | Performed by: INTERNAL MEDICINE

## 2023-04-19 PROCEDURE — 1159F MED LIST DOCD IN RCRD: CPT | Mod: CPTII,S$GLB,, | Performed by: INTERNAL MEDICINE

## 2023-04-19 PROCEDURE — 3066F PR DOCUMENTATION OF TREATMENT FOR NEPHROPATHY: ICD-10-PCS | Mod: CPTII,S$GLB,, | Performed by: INTERNAL MEDICINE

## 2023-04-19 PROCEDURE — 3074F PR MOST RECENT SYSTOLIC BLOOD PRESSURE < 130 MM HG: ICD-10-PCS | Mod: CPTII,S$GLB,, | Performed by: INTERNAL MEDICINE

## 2023-04-19 PROCEDURE — 1126F AMNT PAIN NOTED NONE PRSNT: CPT | Mod: CPTII,S$GLB,, | Performed by: INTERNAL MEDICINE

## 2023-04-19 PROCEDURE — 3061F NEG MICROALBUMINURIA REV: CPT | Mod: CPTII,S$GLB,, | Performed by: INTERNAL MEDICINE

## 2023-04-19 PROCEDURE — 3288F PR FALLS RISK ASSESSMENT DOCUMENTED: ICD-10-PCS | Mod: CPTII,S$GLB,, | Performed by: INTERNAL MEDICINE

## 2023-04-19 PROCEDURE — 3078F PR MOST RECENT DIASTOLIC BLOOD PRESSURE < 80 MM HG: ICD-10-PCS | Mod: CPTII,S$GLB,, | Performed by: INTERNAL MEDICINE

## 2023-04-19 PROCEDURE — 99214 PR OFFICE/OUTPT VISIT, EST, LEVL IV, 30-39 MIN: ICD-10-PCS | Mod: S$GLB,,, | Performed by: INTERNAL MEDICINE

## 2023-04-19 PROCEDURE — 4010F PR ACE/ARB THEARPY RXD/TAKEN: ICD-10-PCS | Mod: CPTII,S$GLB,, | Performed by: INTERNAL MEDICINE

## 2023-04-19 PROCEDURE — 4010F ACE/ARB THERAPY RXD/TAKEN: CPT | Mod: CPTII,S$GLB,, | Performed by: INTERNAL MEDICINE

## 2023-04-19 PROCEDURE — 3066F NEPHROPATHY DOC TX: CPT | Mod: CPTII,S$GLB,, | Performed by: INTERNAL MEDICINE

## 2023-04-19 PROCEDURE — 3078F DIAST BP <80 MM HG: CPT | Mod: CPTII,S$GLB,, | Performed by: INTERNAL MEDICINE

## 2023-04-19 PROCEDURE — 1101F PR PT FALLS ASSESS DOC 0-1 FALLS W/OUT INJ PAST YR: ICD-10-PCS | Mod: CPTII,S$GLB,, | Performed by: INTERNAL MEDICINE

## 2023-04-19 PROCEDURE — 3044F HG A1C LEVEL LT 7.0%: CPT | Mod: CPTII,S$GLB,, | Performed by: INTERNAL MEDICINE

## 2023-04-19 PROCEDURE — 3074F SYST BP LT 130 MM HG: CPT | Mod: CPTII,S$GLB,, | Performed by: INTERNAL MEDICINE

## 2023-04-19 PROCEDURE — 1126F PR PAIN SEVERITY QUANTIFIED, NO PAIN PRESENT: ICD-10-PCS | Mod: CPTII,S$GLB,, | Performed by: INTERNAL MEDICINE

## 2023-04-19 PROCEDURE — 3008F PR BODY MASS INDEX (BMI) DOCUMENTED: ICD-10-PCS | Mod: CPTII,S$GLB,, | Performed by: INTERNAL MEDICINE

## 2023-04-19 PROCEDURE — 1101F PT FALLS ASSESS-DOCD LE1/YR: CPT | Mod: CPTII,S$GLB,, | Performed by: INTERNAL MEDICINE

## 2023-04-19 PROCEDURE — 1159F PR MEDICATION LIST DOCUMENTED IN MEDICAL RECORD: ICD-10-PCS | Mod: CPTII,S$GLB,, | Performed by: INTERNAL MEDICINE

## 2023-04-19 PROCEDURE — 3008F BODY MASS INDEX DOCD: CPT | Mod: CPTII,S$GLB,, | Performed by: INTERNAL MEDICINE

## 2023-04-19 PROCEDURE — 3061F PR NEG MICROALBUMINURIA RESULT DOCUMENTED/REVIEW: ICD-10-PCS | Mod: CPTII,S$GLB,, | Performed by: INTERNAL MEDICINE

## 2023-04-19 RX ORDER — METFORMIN HYDROCHLORIDE 500 MG/1
500 TABLET ORAL 2 TIMES DAILY WITH MEALS
Qty: 180 TABLET | Refills: 1 | Status: SHIPPED | OUTPATIENT
Start: 2023-04-19 | End: 2023-09-20 | Stop reason: SDUPTHER

## 2023-04-19 RX ORDER — LOSARTAN POTASSIUM 50 MG/1
50 TABLET ORAL DAILY
Qty: 90 TABLET | Refills: 1 | Status: SHIPPED | OUTPATIENT
Start: 2023-04-19 | End: 2023-09-20 | Stop reason: SDUPTHER

## 2023-04-19 RX ORDER — ROSUVASTATIN CALCIUM 10 MG/1
10 TABLET, COATED ORAL DAILY
Qty: 90 TABLET | Refills: 1 | Status: SHIPPED | OUTPATIENT
Start: 2023-04-19 | End: 2023-09-20 | Stop reason: SDUPTHER

## 2023-04-19 NOTE — PROGRESS NOTES
Subjective:       Patient ID: May Solomon is a 72 y.o. female.    Chief Complaint: Diabetes and Hypertension    Here for routine follow up; last visit note, most recent available labs, and health maintenance topics reviewed.       Diabetes  She presents for her follow-up diabetic visit. She has type 2 diabetes mellitus. No MedicAlert identification noted. The initial diagnosis of diabetes was made 18 Years ago. Her disease course has been stable (still well controlled). Pertinent negatives for hypoglycemia include no confusion, dizziness, headaches, hunger, mood changes, nervousness/anxiousness, pallor, seizures, sleepiness, speech difficulty, sweats or tremors. Pertinent negatives for diabetes include no blurred vision, no chest pain, no fatigue, no foot paresthesias, no foot ulcerations, no polydipsia, no polyphagia, no polyuria, no visual change, no weakness and no weight loss. There are no hypoglycemic complications. Pertinent negatives for hypoglycemia complications include no blackouts, no hospitalization, no nocturnal hypoglycemia, no required assistance and no required glucagon injection. Symptoms are stable. Pertinent negatives for diabetic complications include no autonomic neuropathy, CVA, heart disease, nephropathy, peripheral neuropathy, PVD or retinopathy. Risk factors for coronary artery disease include dyslipidemia, obesity, post-menopausal and diabetes mellitus. Current diabetic treatment includes oral agent (monotherapy). She is compliant with treatment all of the time. Her weight is fluctuating minimally. She is following a generally healthy diet. Meal planning includes avoidance of concentrated sweets. She has not had a previous visit with a dietitian. She participates in exercise three times a week. She monitors blood glucose at home 1-2 x per day. Blood glucose monitoring compliance is excellent. Her home blood glucose trend is fluctuating minimally. Her breakfast blood glucose range  is generally 110-130 mg/dl. An ACE inhibitor/angiotensin II receptor blocker is being taken. She does not see a podiatrist.Eye exam is current.   Hypertension  This is a chronic problem. The problem is controlled (usually 120s/80s when she checks it which isn't very often). Pertinent negatives include no anxiety, blurred vision, chest pain, headaches, malaise/fatigue, neck pain, palpitations, peripheral edema, shortness of breath or sweats. Past treatments include angiotensin blockers. There are no compliance problems.  There is no history of CVA, PVD or retinopathy.   Hyperlipidemia  This is a chronic problem. Recent lipid tests were reviewed and are normal. Exacerbating diseases include diabetes. Pertinent negatives include no chest pain, myalgias or shortness of breath. Current antihyperlipidemic treatment includes statins. There are no compliance problems.    Review of Systems   Constitutional:  Negative for activity change, appetite change, chills, diaphoresis, fatigue, fever, malaise/fatigue, unexpected weight change and weight loss.   HENT:  Negative for congestion, ear discharge, ear pain, hearing loss, mouth sores, nosebleeds, postnasal drip, rhinorrhea, sinus pressure, sinus pain, sneezing, sore throat, tinnitus, trouble swallowing and voice change.    Eyes:  Negative for blurred vision, photophobia, pain, discharge, redness, itching and visual disturbance.   Respiratory:  Negative for apnea, cough, choking, chest tightness, shortness of breath and wheezing.    Cardiovascular:  Negative for chest pain, palpitations and leg swelling.   Gastrointestinal:  Negative for abdominal distention, abdominal pain, blood in stool, constipation, diarrhea, nausea and vomiting.   Endocrine: Negative for cold intolerance, heat intolerance, polydipsia, polyphagia and polyuria.   Genitourinary:  Negative for decreased urine volume, difficulty urinating, dyspareunia, dysuria, enuresis, frequency, genital sores, hematuria,  menstrual problem, pelvic pain, urgency, vaginal bleeding, vaginal discharge and vaginal pain.   Musculoskeletal:  Negative for arthralgias, back pain, gait problem, joint swelling, myalgias, neck pain and neck stiffness.   Skin:  Negative for pallor, rash and wound.   Allergic/Immunologic: Negative for environmental allergies, food allergies and immunocompromised state.   Neurological:  Negative for dizziness, tremors, seizures, syncope, facial asymmetry, speech difficulty, weakness, light-headedness, numbness and headaches.   Hematological:  Negative for adenopathy. Does not bruise/bleed easily.   Psychiatric/Behavioral:  Negative for confusion, decreased concentration, dysphoric mood, hallucinations, self-injury, sleep disturbance and suicidal ideas. The patient is not nervous/anxious.      Past Medical History:   Diagnosis Date    Basal cell carcinoma     Diabetes mellitus, type 2     History of colonic polyps     HTN (hypertension)     Hyperlipidemia       Past Surgical History:   Procedure Laterality Date    BREAST BIOPSY      COLONOSCOPY  05/2017    EYE SURGERY Right 05/2021    TRIGGER FINGER RELEASE Left 10/26/2022    Procedure: RELEASE, TRIGGER FINGER;  Surgeon: Sachin Tong MD;  Location: [facility];  Service: Orthopedics;  Laterality: Left;       Family History   Problem Relation Age of Onset    Colon cancer Mother 76    Hypertension Mother     Obesity Mother     Prostate cancer Father     Breast cancer Neg Hx        Social History     Socioeconomic History    Marital status:    Occupational History    Occupation: retired   Tobacco Use    Smoking status: Never    Smokeless tobacco: Never   Substance and Sexual Activity    Alcohol use: Yes     Comment: occasional    Drug use: Never    Sexual activity: Not Currently   Social History Narrative    Live with son       Current Outpatient Medications   Medication Sig Dispense Refill    calcium carbonate (OS-SANDEEP) 500 mg calcium (1,250 mg) chewable tablet  "Take 1 tablet by mouth once daily.      multivitamin (THERAGRAN) per tablet Take 1 tablet by mouth once daily.      losartan (COZAAR) 50 MG tablet Take 1 tablet (50 mg total) by mouth once daily. 90 tablet 1    metFORMIN (GLUCOPHAGE) 500 MG tablet Take 1 tablet (500 mg total) by mouth 2 (two) times daily with meals. 180 tablet 1    rosuvastatin (CRESTOR) 10 MG tablet Take 1 tablet (10 mg total) by mouth once daily. 90 tablet 1     No current facility-administered medications for this visit.       Review of patient's allergies indicates:   Allergen Reactions    Sulfa (sulfonamide antibiotics) Rash     Objective:      Blood pressure 120/62, pulse 78, temperature 99.1 °F (37.3 °C), height 5' 3" (1.6 m), weight 78.1 kg (172 lb 3.2 oz), SpO2 97 %. Body mass index is 30.5 kg/m².   Physical Exam  Vitals and nursing note reviewed.   Constitutional:       General: She is not in acute distress.     Appearance: She is well-developed. She is obese. She is not ill-appearing, toxic-appearing or diaphoretic.   HENT:      Head: Normocephalic and atraumatic.   Eyes:      General: No scleral icterus.        Right eye: No discharge.         Left eye: No discharge.      Conjunctiva/sclera: Conjunctivae normal.   Neck:      Vascular: No carotid bruit.   Cardiovascular:      Rate and Rhythm: Normal rate and regular rhythm.      Heart sounds: Normal heart sounds. No murmur heard.  Pulmonary:      Effort: Pulmonary effort is normal. No respiratory distress.      Breath sounds: Normal breath sounds. No decreased breath sounds, wheezing, rhonchi or rales.   Abdominal:      General: There is no distension.      Palpations: Abdomen is soft.      Tenderness: There is no abdominal tenderness. There is no guarding or rebound.   Musculoskeletal:      Right lower leg: No edema.      Left lower leg: No edema.   Skin:     General: Skin is warm and dry.   Neurological:      Mental Status: She is alert.      Motor: No tremor.   Psychiatric:         " Mood and Affect: Mood normal.         Speech: Speech normal.         Behavior: Behavior normal.         Telephone on 03/28/2023   Component Date Value Ref Range Status    Glucose 04/17/2023 124 (H)  65 - 99 mg/dL Final    Comment:               Fasting reference interval     For someone without known diabetes, a glucose value  between 100 and 125 mg/dL is consistent with  prediabetes and should be confirmed with a  follow-up test.         BUN 04/17/2023 23  7 - 25 mg/dL Final    Creatinine 04/17/2023 1.11 (H)  0.60 - 1.00 mg/dL Final    eGFR 04/17/2023 52 (L)  > OR = 60 mL/min/1.73m2 Final    Comment: The eGFR is based on the CKD-EPI 2021 equation. To calculate   the new eGFR from a previous Creatinine or Cystatin C  result, go to https://www.kidney.org/professionals/  kdoqi/gfr%5Fcalculator      BUN/Creatinine Ratio 04/17/2023 21  6 - 22 (calc) Final    Sodium 04/17/2023 140  135 - 146 mmol/L Final    Potassium 04/17/2023 5.1  3.5 - 5.3 mmol/L Final    Chloride 04/17/2023 102  98 - 110 mmol/L Final    CO2 04/17/2023 29  20 - 32 mmol/L Final    Calcium 04/17/2023 10.3  8.6 - 10.4 mg/dL Final    Total Protein 04/17/2023 7.4  6.1 - 8.1 g/dL Final    Albumin 04/17/2023 4.7  3.6 - 5.1 g/dL Final    Globulin, Total 04/17/2023 2.7  1.9 - 3.7 g/dL (calc) Final    Albumin/Globulin Ratio 04/17/2023 1.7  1.0 - 2.5 (calc) Final    Total Bilirubin 04/17/2023 1.3 (H)  0.2 - 1.2 mg/dL Final    Alkaline Phosphatase 04/17/2023 54  37 - 153 U/L Final    AST 04/17/2023 40 (H)  10 - 35 U/L Final    ALT 04/17/2023 31 (H)  6 - 29 U/L Final    Hemoglobin A1C 04/17/2023 6.3 (H)  <5.7 % of total Hgb Final    Comment: For someone without known diabetes, a hemoglobin   A1c value between 5.7% and 6.4% is consistent with  prediabetes and should be confirmed with a   follow-up test.     For someone with known diabetes, a value <7%  indicates that their diabetes is well controlled. A1c  targets should be individualized based on duration  of  diabetes, age, comorbid conditions, and other  considerations.     This assay result is consistent with an increased risk  of diabetes.     Currently, no consensus exists regarding use of  hemoglobin A1c for diagnosis of diabetes for children.         Color, UA 04/17/2023 DARK YELLOW  YELLOW Final    Appearance, UA 04/17/2023 CLEAR  CLEAR Final    Specific Gravity, UA 04/17/2023 1.025  1.001 - 1.035 Final    pH, UA 04/17/2023 < OR = 5.0  5.0 - 8.0 Final    Glucose, UA 04/17/2023 NEGATIVE  NEGATIVE Final    Bilirubin, UA 04/17/2023 NEGATIVE  NEGATIVE Final    Ketones, UA 04/17/2023 NEGATIVE  NEGATIVE Final    Occult Blood UA 04/17/2023 NEGATIVE  NEGATIVE Final    Protein, UA 04/17/2023 TRACE (A)  NEGATIVE Final    Nitrite, UA 04/17/2023 NEGATIVE  NEGATIVE Final    Leukocytes, UA 04/17/2023 1+ (A)  NEGATIVE Final    Cholesterol 04/17/2023 174  <200 mg/dL Final    HDL 04/17/2023 61  > OR = 50 mg/dL Final    Triglycerides 04/17/2023 166 (H)  <150 mg/dL Final    LDL Cholesterol 04/17/2023 87  mg/dL (calc) Final    Comment: Reference range: <100     Desirable range <100 mg/dL for primary prevention;    <70 mg/dL for patients with CHD or diabetic patients   with > or = 2 CHD risk factors.     LDL-C is now calculated using the Pacheco-Dalton   calculation, which is a validated novel method providing   better accuracy than the Friedewald equation in the   estimation of LDL-C.   Pacheco SS et al. MADHURI. 2013;310(19): 0128-6697   (http://education.[employer].com/faq/IZS885)      HDL/Cholesterol Ratio 04/17/2023 2.9  <5.0 (calc) Final    Non HDL Chol. (LDL+VLDL) 04/17/2023 113  <130 mg/dL (calc) Final    Comment: For patients with diabetes plus 1 major ASCVD risk   factor, treating to a non-HDL-C goal of <100 mg/dL   (LDL-C of <70 mg/dL) is considered a therapeutic   option.      Creatinine, Urine 04/17/2023 211  20 - 275 mg/dL Final    Microalb, Ur 04/17/2023 3.0  See Note: mg/dL Final    Comment: Reference  Range:    Reference Range  Not established      Microalb/Creat Ratio 04/17/2023 14  <30 mcg/mg creat Final    Comment:    The ADA defines abnormalities in albumin  excretion as follows:     Albuminuria Category        Result (mcg/mg creatinine)     Normal to Mildly increased   <30  Moderately increased            Severely increased           > OR = 300     The ADA recommends that at least two of three  specimens collected within a 3-6 month period be  abnormal before considering a patient to be  within a diagnostic category.      TSH w/reflex to FT4 04/17/2023 2.48  0.40 - 4.50 mIU/L Final   ]  Assessment:       1. Type 2 diabetes mellitus with stage 3a chronic kidney disease, without long-term current use of insulin    2. Essential hypertension    3. Pure hypercholesterolemia    4. Stage 3a chronic kidney disease        Plan:       May was seen today for diabetes and hypertension.    Diagnoses and all orders for this visit:    Type 2 diabetes mellitus with stage 3a chronic kidney disease, without long-term current use of insulin  -     metFORMIN (GLUCOPHAGE) 500 MG tablet; Take 1 tablet (500 mg total) by mouth 2 (two) times daily with meals.  -     rosuvastatin (CRESTOR) 10 MG tablet; Take 1 tablet (10 mg total) by mouth once daily.    Essential hypertension  -     losartan (COZAAR) 50 MG tablet; Take 1 tablet (50 mg total) by mouth once daily.    Pure hypercholesterolemia  -     rosuvastatin (CRESTOR) 10 MG tablet; Take 1 tablet (10 mg total) by mouth once daily.    Stage 3a chronic kidney disease  Comments:  Discussed maintaining hydration, limiting NSAIDs

## 2023-06-14 ENCOUNTER — TELEPHONE (OUTPATIENT)
Dept: FAMILY MEDICINE | Facility: CLINIC | Age: 73
End: 2023-06-14

## 2023-06-14 DIAGNOSIS — M25.562 LEFT KNEE PAIN, UNSPECIFIED CHRONICITY: Primary | ICD-10-CM

## 2023-06-29 ENCOUNTER — OFFICE VISIT (OUTPATIENT)
Dept: ORTHOPEDICS | Facility: CLINIC | Age: 73
End: 2023-06-29
Payer: MEDICARE

## 2023-06-29 VITALS
HEIGHT: 63 IN | BODY MASS INDEX: 30.12 KG/M2 | DIASTOLIC BLOOD PRESSURE: 78 MMHG | WEIGHT: 170 LBS | SYSTOLIC BLOOD PRESSURE: 150 MMHG

## 2023-06-29 DIAGNOSIS — M17.12 PRIMARY OSTEOARTHRITIS OF LEFT KNEE: ICD-10-CM

## 2023-06-29 DIAGNOSIS — M25.562 LEFT KNEE PAIN, UNSPECIFIED CHRONICITY: Primary | ICD-10-CM

## 2023-06-29 PROCEDURE — 3078F PR MOST RECENT DIASTOLIC BLOOD PRESSURE < 80 MM HG: ICD-10-PCS | Mod: CPTII,S$GLB,, | Performed by: ORTHOPAEDIC SURGERY

## 2023-06-29 PROCEDURE — 3066F PR DOCUMENTATION OF TREATMENT FOR NEPHROPATHY: ICD-10-PCS | Mod: CPTII,S$GLB,, | Performed by: ORTHOPAEDIC SURGERY

## 2023-06-29 PROCEDURE — 20610 DRAIN/INJ JOINT/BURSA W/O US: CPT | Mod: LT,S$GLB,, | Performed by: ORTHOPAEDIC SURGERY

## 2023-06-29 PROCEDURE — 3061F PR NEG MICROALBUMINURIA RESULT DOCUMENTED/REVIEW: ICD-10-PCS | Mod: CPTII,S$GLB,, | Performed by: ORTHOPAEDIC SURGERY

## 2023-06-29 PROCEDURE — 3288F FALL RISK ASSESSMENT DOCD: CPT | Mod: CPTII,S$GLB,, | Performed by: ORTHOPAEDIC SURGERY

## 2023-06-29 PROCEDURE — 1125F PR PAIN SEVERITY QUANTIFIED, PAIN PRESENT: ICD-10-PCS | Mod: CPTII,S$GLB,, | Performed by: ORTHOPAEDIC SURGERY

## 2023-06-29 PROCEDURE — 1101F PT FALLS ASSESS-DOCD LE1/YR: CPT | Mod: CPTII,S$GLB,, | Performed by: ORTHOPAEDIC SURGERY

## 2023-06-29 PROCEDURE — 4010F PR ACE/ARB THEARPY RXD/TAKEN: ICD-10-PCS | Mod: CPTII,S$GLB,, | Performed by: ORTHOPAEDIC SURGERY

## 2023-06-29 PROCEDURE — 1159F PR MEDICATION LIST DOCUMENTED IN MEDICAL RECORD: ICD-10-PCS | Mod: CPTII,S$GLB,, | Performed by: ORTHOPAEDIC SURGERY

## 2023-06-29 PROCEDURE — 3077F PR MOST RECENT SYSTOLIC BLOOD PRESSURE >= 140 MM HG: ICD-10-PCS | Mod: CPTII,S$GLB,, | Performed by: ORTHOPAEDIC SURGERY

## 2023-06-29 PROCEDURE — 3066F NEPHROPATHY DOC TX: CPT | Mod: CPTII,S$GLB,, | Performed by: ORTHOPAEDIC SURGERY

## 2023-06-29 PROCEDURE — 3044F PR MOST RECENT HEMOGLOBIN A1C LEVEL <7.0%: ICD-10-PCS | Mod: CPTII,S$GLB,, | Performed by: ORTHOPAEDIC SURGERY

## 2023-06-29 PROCEDURE — 4010F ACE/ARB THERAPY RXD/TAKEN: CPT | Mod: CPTII,S$GLB,, | Performed by: ORTHOPAEDIC SURGERY

## 2023-06-29 PROCEDURE — 99213 OFFICE O/P EST LOW 20 MIN: CPT | Mod: 25,S$GLB,, | Performed by: ORTHOPAEDIC SURGERY

## 2023-06-29 PROCEDURE — 3288F PR FALLS RISK ASSESSMENT DOCUMENTED: ICD-10-PCS | Mod: CPTII,S$GLB,, | Performed by: ORTHOPAEDIC SURGERY

## 2023-06-29 PROCEDURE — 3078F DIAST BP <80 MM HG: CPT | Mod: CPTII,S$GLB,, | Performed by: ORTHOPAEDIC SURGERY

## 2023-06-29 PROCEDURE — 1125F AMNT PAIN NOTED PAIN PRSNT: CPT | Mod: CPTII,S$GLB,, | Performed by: ORTHOPAEDIC SURGERY

## 2023-06-29 PROCEDURE — 3008F BODY MASS INDEX DOCD: CPT | Mod: CPTII,S$GLB,, | Performed by: ORTHOPAEDIC SURGERY

## 2023-06-29 PROCEDURE — 1159F MED LIST DOCD IN RCRD: CPT | Mod: CPTII,S$GLB,, | Performed by: ORTHOPAEDIC SURGERY

## 2023-06-29 PROCEDURE — 3044F HG A1C LEVEL LT 7.0%: CPT | Mod: CPTII,S$GLB,, | Performed by: ORTHOPAEDIC SURGERY

## 2023-06-29 PROCEDURE — 1101F PR PT FALLS ASSESS DOC 0-1 FALLS W/OUT INJ PAST YR: ICD-10-PCS | Mod: CPTII,S$GLB,, | Performed by: ORTHOPAEDIC SURGERY

## 2023-06-29 PROCEDURE — 99213 PR OFFICE/OUTPT VISIT, EST, LEVL III, 20-29 MIN: ICD-10-PCS | Mod: 25,S$GLB,, | Performed by: ORTHOPAEDIC SURGERY

## 2023-06-29 PROCEDURE — 3077F SYST BP >= 140 MM HG: CPT | Mod: CPTII,S$GLB,, | Performed by: ORTHOPAEDIC SURGERY

## 2023-06-29 PROCEDURE — 1160F RVW MEDS BY RX/DR IN RCRD: CPT | Mod: CPTII,S$GLB,, | Performed by: ORTHOPAEDIC SURGERY

## 2023-06-29 PROCEDURE — 3008F PR BODY MASS INDEX (BMI) DOCUMENTED: ICD-10-PCS | Mod: CPTII,S$GLB,, | Performed by: ORTHOPAEDIC SURGERY

## 2023-06-29 PROCEDURE — 1160F PR REVIEW ALL MEDS BY PRESCRIBER/CLIN PHARMACIST DOCUMENTED: ICD-10-PCS | Mod: CPTII,S$GLB,, | Performed by: ORTHOPAEDIC SURGERY

## 2023-06-29 PROCEDURE — 3061F NEG MICROALBUMINURIA REV: CPT | Mod: CPTII,S$GLB,, | Performed by: ORTHOPAEDIC SURGERY

## 2023-06-29 PROCEDURE — 20610 LARGE JOINT ASPIRATION/INJECTION: L KNEE: ICD-10-PCS | Mod: LT,S$GLB,, | Performed by: ORTHOPAEDIC SURGERY

## 2023-06-29 RX ORDER — TRIAMCINOLONE ACETONIDE 40 MG/ML
40 INJECTION, SUSPENSION INTRA-ARTICULAR; INTRAMUSCULAR
Status: DISCONTINUED | OUTPATIENT
Start: 2023-06-29 | End: 2023-06-29 | Stop reason: HOSPADM

## 2023-06-29 RX ADMIN — TRIAMCINOLONE ACETONIDE 40 MG: 40 INJECTION, SUSPENSION INTRA-ARTICULAR; INTRAMUSCULAR at 12:06

## 2023-06-29 NOTE — PROGRESS NOTES
[facility] ORTHOPEDICS    Subjective:     Chief Complaint:   Chief Complaint   Patient presents with    Left Knee - Pain     Left knee pain x beginning June, c/o pain constant, dull ache, no swelling, pain at medial aspect of knee, increased pain with laying, no giving way, no popping,        Past Medical History:   Diagnosis Date    Basal cell carcinoma     Diabetes mellitus, type 2     History of colonic polyps     HTN (hypertension)     Hyperlipidemia        Past Surgical History:   Procedure Laterality Date    BREAST BIOPSY      COLONOSCOPY  05/2017    EYE SURGERY Right 05/2021    TRIGGER FINGER RELEASE Left 10/26/2022    Procedure: RELEASE, TRIGGER FINGER;  Surgeon: Sachin Tong MD;  Location: [facility];  Service: Orthopedics;  Laterality: Left;       Current Outpatient Medications   Medication Sig    calcium carbonate (OS-SANDEEP) 500 mg calcium (1,250 mg) chewable tablet Take 1 tablet by mouth once daily.    losartan (COZAAR) 50 MG tablet Take 1 tablet (50 mg total) by mouth once daily.    metFORMIN (GLUCOPHAGE) 500 MG tablet Take 1 tablet (500 mg total) by mouth 2 (two) times daily with meals.    multivitamin (THERAGRAN) per tablet Take 1 tablet by mouth once daily.    rosuvastatin (CRESTOR) 10 MG tablet Take 1 tablet (10 mg total) by mouth once daily.     No current facility-administered medications for this visit.       Review of patient's allergies indicates:   Allergen Reactions    Sulfa (sulfonamide antibiotics) Rash       Family History   Problem Relation Age of Onset    Colon cancer Mother 76    Hypertension Mother     Obesity Mother     Prostate cancer Father     Breast cancer Neg Hx        Social History     Socioeconomic History    Marital status:    Occupational History    Occupation: retired   Tobacco Use    Smoking status: Never    Smokeless tobacco: Never   Substance and Sexual Activity    Alcohol use: Yes     Comment: occasional    Drug use: Never    Sexual activity: Not Currently   Social  History Narrative    Live with son       History of present illness:  May comes in today with a new complaint of left knee pain for approximately 1 month.  She does not recollect any injury or trauma.    Review of Systems:    Constitution: Negative for chills, fever, and sweats.  Negative for unexplained weight loss.    HENT:  Negative for headaches and blurry vision.    Cardiovascular:Negative for chest pain or irregular heart beat. Negative for hypertension.    Respiratory:  Negative for cough and shortness of breath.    Gastrointestinal: Negative for abdominal pain, heartburn, melena, nausea, and vomitting.    Genitourinary:  Negative bladder incontinence and dysuria.    Musculoskeletal:  See HPI for details.     Neurological: Negative for numbness.    Psychiatric/Behavioral: Negative for depression.  The patient is not nervous/anxious.      Endocrine: Negative for polyuria    Hematologic/Lymphatic: Negative for bleeding problem.  Does not bruise/bleed easily.    Skin: Negative for poor would healing and rash    Objective:      Physical Examination:    Vital Signs:    Vitals:    06/29/23 1254   BP: (!) 150/78       Body mass index is 30.11 kg/m².    This a well-developed, well nourished patient in no acute distress.  They are alert and oriented and cooperative to examination.        Left knee exam: Skin to her left knee is clean dry and intact.  There is no erythema ecchymosis.  There are no signs or symptoms of infection.  Patient is neurovascularly intact throughout the left lower extremity.  Left calf is soft and nontender.  Left knee range of motion well-preserved at 0-120 degrees.  Left knee is stable to varus and valgus stresses.  She can weightbear as tolerated on her left lower extremity.      Pertinent New Results:    XRAY Report / Interpretation:   Three views were taken of the left knee today: AP, lateral, and sunrise views.  They reveal no acute fractures or dislocations.  She has moderate  "arthrosis tricompartmentally in the left knee.  Visualized soft tissues appear unremarkable.    Assessment/Plan:      1. Left knee osteoarthritis.      I injected her left knee today via an anterior lateral approach with 40 mg of Kenalog and lidocaine.  She tolerated this well.  We will see her back in 6 weeks to see how she responds.  Advised if she was doing quite well, she can simply cancel follow-up on an as-needed basis if/when she ever has recurrence of symptoms.    Ari Lama, Physician Assistant, served in the capacity as a "scribe" for this patient encounter.  A "face-to-face" encounter occurred with Dr. Sachin Tong on this date.  The treatment plan and medical decision-making is outlined above. Patient was seen and examined with a chaperone.       This note was created using Dragon voice recognition software that occasionally misinterpreted phrases or words.          "

## 2023-06-29 NOTE — PROCEDURES
Large Joint Aspiration/Injection: L knee    Date/Time: 6/29/2023 12:30 PM  Performed by: Sachin Tong MD  Authorized by: Sachin Tong MD     Consent Done?:  Yes (Verbal)  Indications:  Arthritis and pain  Site marked: the procedure site was marked    Timeout: prior to procedure the correct patient, procedure, and site was verified    Prep: patient was prepped and draped in usual sterile fashion      Local anesthesia used?: Yes    Local anesthetic:  Lidocaine 1% without epinephrine  Ultrasonic Guidance for needle placement?: No    Location:  Knee  Site:  L knee  Medications:  40 mg triamcinolone acetonide 40 mg/mL  Patient tolerance:  Patient tolerated the procedure well with no immediate complications

## 2023-08-10 ENCOUNTER — OFFICE VISIT (OUTPATIENT)
Dept: ORTHOPEDICS | Facility: CLINIC | Age: 73
End: 2023-08-10
Payer: MEDICARE

## 2023-08-10 VITALS — HEIGHT: 63 IN | WEIGHT: 170 LBS | BODY MASS INDEX: 30.12 KG/M2

## 2023-08-10 DIAGNOSIS — M17.12 PRIMARY OSTEOARTHRITIS OF LEFT KNEE: Primary | ICD-10-CM

## 2023-08-10 DIAGNOSIS — M23.204 OTHER OLD TEAR OF MEDIAL MENISCUS OF LEFT KNEE: ICD-10-CM

## 2023-08-10 PROCEDURE — 99213 OFFICE O/P EST LOW 20 MIN: CPT | Mod: S$GLB,,, | Performed by: ORTHOPAEDIC SURGERY

## 2023-08-10 PROCEDURE — 1160F RVW MEDS BY RX/DR IN RCRD: CPT | Mod: CPTII,S$GLB,, | Performed by: ORTHOPAEDIC SURGERY

## 2023-08-10 PROCEDURE — 3008F BODY MASS INDEX DOCD: CPT | Mod: CPTII,S$GLB,, | Performed by: ORTHOPAEDIC SURGERY

## 2023-08-10 PROCEDURE — 3061F PR NEG MICROALBUMINURIA RESULT DOCUMENTED/REVIEW: ICD-10-PCS | Mod: CPTII,S$GLB,, | Performed by: ORTHOPAEDIC SURGERY

## 2023-08-10 PROCEDURE — 1160F PR REVIEW ALL MEDS BY PRESCRIBER/CLIN PHARMACIST DOCUMENTED: ICD-10-PCS | Mod: CPTII,S$GLB,, | Performed by: ORTHOPAEDIC SURGERY

## 2023-08-10 PROCEDURE — 1125F AMNT PAIN NOTED PAIN PRSNT: CPT | Mod: CPTII,S$GLB,, | Performed by: ORTHOPAEDIC SURGERY

## 2023-08-10 PROCEDURE — 99213 PR OFFICE/OUTPT VISIT, EST, LEVL III, 20-29 MIN: ICD-10-PCS | Mod: S$GLB,,, | Performed by: ORTHOPAEDIC SURGERY

## 2023-08-10 PROCEDURE — 4010F PR ACE/ARB THEARPY RXD/TAKEN: ICD-10-PCS | Mod: CPTII,S$GLB,, | Performed by: ORTHOPAEDIC SURGERY

## 2023-08-10 PROCEDURE — 3066F NEPHROPATHY DOC TX: CPT | Mod: CPTII,S$GLB,, | Performed by: ORTHOPAEDIC SURGERY

## 2023-08-10 PROCEDURE — 3061F NEG MICROALBUMINURIA REV: CPT | Mod: CPTII,S$GLB,, | Performed by: ORTHOPAEDIC SURGERY

## 2023-08-10 PROCEDURE — 3008F PR BODY MASS INDEX (BMI) DOCUMENTED: ICD-10-PCS | Mod: CPTII,S$GLB,, | Performed by: ORTHOPAEDIC SURGERY

## 2023-08-10 PROCEDURE — 3288F FALL RISK ASSESSMENT DOCD: CPT | Mod: CPTII,S$GLB,, | Performed by: ORTHOPAEDIC SURGERY

## 2023-08-10 PROCEDURE — 3066F PR DOCUMENTATION OF TREATMENT FOR NEPHROPATHY: ICD-10-PCS | Mod: CPTII,S$GLB,, | Performed by: ORTHOPAEDIC SURGERY

## 2023-08-10 PROCEDURE — 3044F HG A1C LEVEL LT 7.0%: CPT | Mod: CPTII,S$GLB,, | Performed by: ORTHOPAEDIC SURGERY

## 2023-08-10 PROCEDURE — 1125F PR PAIN SEVERITY QUANTIFIED, PAIN PRESENT: ICD-10-PCS | Mod: CPTII,S$GLB,, | Performed by: ORTHOPAEDIC SURGERY

## 2023-08-10 PROCEDURE — 1159F PR MEDICATION LIST DOCUMENTED IN MEDICAL RECORD: ICD-10-PCS | Mod: CPTII,S$GLB,, | Performed by: ORTHOPAEDIC SURGERY

## 2023-08-10 PROCEDURE — 1101F PT FALLS ASSESS-DOCD LE1/YR: CPT | Mod: CPTII,S$GLB,, | Performed by: ORTHOPAEDIC SURGERY

## 2023-08-10 PROCEDURE — 1101F PR PT FALLS ASSESS DOC 0-1 FALLS W/OUT INJ PAST YR: ICD-10-PCS | Mod: CPTII,S$GLB,, | Performed by: ORTHOPAEDIC SURGERY

## 2023-08-10 PROCEDURE — 3044F PR MOST RECENT HEMOGLOBIN A1C LEVEL <7.0%: ICD-10-PCS | Mod: CPTII,S$GLB,, | Performed by: ORTHOPAEDIC SURGERY

## 2023-08-10 PROCEDURE — 1159F MED LIST DOCD IN RCRD: CPT | Mod: CPTII,S$GLB,, | Performed by: ORTHOPAEDIC SURGERY

## 2023-08-10 PROCEDURE — 4010F ACE/ARB THERAPY RXD/TAKEN: CPT | Mod: CPTII,S$GLB,, | Performed by: ORTHOPAEDIC SURGERY

## 2023-08-10 PROCEDURE — 3288F PR FALLS RISK ASSESSMENT DOCUMENTED: ICD-10-PCS | Mod: CPTII,S$GLB,, | Performed by: ORTHOPAEDIC SURGERY

## 2023-08-10 NOTE — PROGRESS NOTES
[facility] ORTHOPEDICS    Subjective:     Chief Complaint:   Chief Complaint   Patient presents with    Left Knee - Pain     Left knee injected 6/29/23, helped very little, pain is the same, pretty much constant, but fluctuates,        Past Medical History:   Diagnosis Date    Basal cell carcinoma     Diabetes mellitus, type 2     History of colonic polyps     HTN (hypertension)     Hyperlipidemia        Past Surgical History:   Procedure Laterality Date    BREAST BIOPSY      COLONOSCOPY  05/2017    EYE SURGERY Right 05/2021    TRIGGER FINGER RELEASE Left 10/26/2022    Procedure: RELEASE, TRIGGER FINGER;  Surgeon: Sachin Tong MD;  Location: [facility];  Service: Orthopedics;  Laterality: Left;       Current Outpatient Medications   Medication Sig    calcium carbonate (OS-SANDEEP) 500 mg calcium (1,250 mg) chewable tablet Take 1 tablet by mouth once daily.    losartan (COZAAR) 50 MG tablet Take 1 tablet (50 mg total) by mouth once daily.    metFORMIN (GLUCOPHAGE) 500 MG tablet Take 1 tablet (500 mg total) by mouth 2 (two) times daily with meals.    multivitamin (THERAGRAN) per tablet Take 1 tablet by mouth once daily.    rosuvastatin (CRESTOR) 10 MG tablet Take 1 tablet (10 mg total) by mouth once daily.     No current facility-administered medications for this visit.       Review of patient's allergies indicates:   Allergen Reactions    Sulfa (sulfonamide antibiotics) Rash       Family History   Problem Relation Age of Onset    Colon cancer Mother 76    Hypertension Mother     Obesity Mother     Prostate cancer Father     Breast cancer Neg Hx        Social History     Socioeconomic History    Marital status:    Occupational History    Occupation: retired   Tobacco Use    Smoking status: Never    Smokeless tobacco: Never   Substance and Sexual Activity    Alcohol use: Yes     Comment: occasional    Drug use: Never    Sexual activity: Not Currently   Social History Narrative    Live with son     Social Determinants  of Health     Stress: No Stress Concern Present (12/16/2020)    [employer] - Occupational Stress Questionnaire     Feeling of Stress : Only a little       History of present illness:  72-year-old female, returns clinic today follow-up on the left knee.  Last seen in the office on June 29, she had a intra-articular injection.  This provided absolutely no relief in her symptoms whatsoever.  She continues to complain of primarily medial joint line pain.    History of prior right knee arthroscopy and the meniscectomy.  We are she feels that the symptoms are very similar.      Review of Systems:    Constitution: Negative for chills, fever, and sweats.  Negative for unexplained weight loss.    HENT:  Negative for headaches and blurry vision.    Cardiovascular:Negative for chest pain or irregular heart beat. Negative for hypertension.    Respiratory:  Negative for cough and shortness of breath.    Gastrointestinal: Negative for abdominal pain, heartburn, melena, nausea, and vomitting.    Genitourinary:  Negative bladder incontinence and dysuria.    Musculoskeletal:  See HPI for details.     Neurological: Negative for numbness.    Psychiatric/Behavioral: Negative for depression.  The patient is not nervous/anxious.      Endocrine: Negative for polyuria    Hematologic/Lymphatic: Negative for bleeding problem.  Does not bruise/bleed easily.    Skin: Negative for poor would healing and rash    Objective:      Physical Examination:    Vital Signs:  There were no vitals filed for this visit.    Body mass index is 30.11 kg/m².    This a well-developed, well nourished patient in no acute distress.  They are alert and oriented and cooperative to examination.        Examination of the left knee, skin is dry and intact, no erythema ecchymosis no signs of infection.  Tender over the medial joint line, pain with medial Rony's test.  Stable anterior-posterior varus and valgus stress.  Calf soft  "nontender from straight leg raise negative.    Pertinent New Results:    XRAY Report / Interpretation:   No new images were taken today but I did review images which show advanced medial compartment collapse in the right knee, lateral compartment collapse in the left knee.    Assessment/Plan:      Osteoarthritis left knee, medial meniscal tear, patient lateral compartment changes on the left knee.  Unsure if this is underlying osteoarthritis or a meniscal tear.  The fact that she did respond to the injection we will do an MRI since her arthritis is not bone-on-bone.  We will see her back with those results.    Brian Chandra, Physician Assistant, served in the capacity as a "scribe" for this patient encounter.  A "face-to-face" encounter occurred with Dr. Sachin Tong on this date.  The treatment plan and medical decision-making is outlined above. Patient was seen and examined with a chaperone.       This note was created using Dragon voice recognition software that occasionally misinterpreted phrases or words.          "

## 2023-08-11 ENCOUNTER — HOSPITAL ENCOUNTER (OUTPATIENT)
Dept: RADIOLOGY | Facility: HOSPITAL | Age: 73
Discharge: HOME | End: 2023-08-11
Attending: ORTHOPAEDIC SURGERY
Payer: MEDICARE

## 2023-08-11 DIAGNOSIS — M23.204 OTHER OLD TEAR OF MEDIAL MENISCUS OF LEFT KNEE: ICD-10-CM

## 2023-08-11 PROCEDURE — 73721 MRI KNEE WITHOUT CONTRAST LEFT: ICD-10-PCS | Mod: 26,LT,, | Performed by: RADIOLOGY

## 2023-08-11 PROCEDURE — 73721 MRI JNT OF LWR EXTRE W/O DYE: CPT | Mod: TC,LT

## 2023-08-11 PROCEDURE — 73721 MRI JNT OF LWR EXTRE W/O DYE: CPT | Mod: 26,LT,, | Performed by: RADIOLOGY

## 2023-08-29 ENCOUNTER — OFFICE VISIT (OUTPATIENT)
Dept: ORTHOPEDICS | Facility: CLINIC | Age: 73
End: 2023-08-29
Payer: MEDICARE

## 2023-08-29 VITALS — WEIGHT: 170 LBS | HEIGHT: 63 IN | BODY MASS INDEX: 30.12 KG/M2

## 2023-08-29 DIAGNOSIS — M23.204 DEGENERATIVE TEAR OF MEDIAL MENISCUS OF LEFT KNEE: Primary | ICD-10-CM

## 2023-08-29 DIAGNOSIS — M23.301 DEGENERATIVE TEAR OF LATERAL MENISCUS OF LEFT KNEE: ICD-10-CM

## 2023-08-29 PROCEDURE — 4010F PR ACE/ARB THEARPY RXD/TAKEN: ICD-10-PCS | Mod: CPTII,S$GLB,, | Performed by: ORTHOPAEDIC SURGERY

## 2023-08-29 PROCEDURE — 3008F PR BODY MASS INDEX (BMI) DOCUMENTED: ICD-10-PCS | Mod: CPTII,S$GLB,, | Performed by: ORTHOPAEDIC SURGERY

## 2023-08-29 PROCEDURE — 3066F NEPHROPATHY DOC TX: CPT | Mod: CPTII,S$GLB,, | Performed by: ORTHOPAEDIC SURGERY

## 2023-08-29 PROCEDURE — 4010F ACE/ARB THERAPY RXD/TAKEN: CPT | Mod: CPTII,S$GLB,, | Performed by: ORTHOPAEDIC SURGERY

## 2023-08-29 PROCEDURE — 3288F FALL RISK ASSESSMENT DOCD: CPT | Mod: CPTII,S$GLB,, | Performed by: ORTHOPAEDIC SURGERY

## 2023-08-29 PROCEDURE — 1101F PT FALLS ASSESS-DOCD LE1/YR: CPT | Mod: CPTII,S$GLB,, | Performed by: ORTHOPAEDIC SURGERY

## 2023-08-29 PROCEDURE — 3008F BODY MASS INDEX DOCD: CPT | Mod: CPTII,S$GLB,, | Performed by: ORTHOPAEDIC SURGERY

## 2023-08-29 PROCEDURE — 1125F AMNT PAIN NOTED PAIN PRSNT: CPT | Mod: CPTII,S$GLB,, | Performed by: ORTHOPAEDIC SURGERY

## 2023-08-29 PROCEDURE — 99213 PR OFFICE/OUTPT VISIT, EST, LEVL III, 20-29 MIN: ICD-10-PCS | Mod: S$GLB,,, | Performed by: ORTHOPAEDIC SURGERY

## 2023-08-29 PROCEDURE — 1101F PR PT FALLS ASSESS DOC 0-1 FALLS W/OUT INJ PAST YR: ICD-10-PCS | Mod: CPTII,S$GLB,, | Performed by: ORTHOPAEDIC SURGERY

## 2023-08-29 PROCEDURE — 1160F RVW MEDS BY RX/DR IN RCRD: CPT | Mod: CPTII,S$GLB,, | Performed by: ORTHOPAEDIC SURGERY

## 2023-08-29 PROCEDURE — 3061F PR NEG MICROALBUMINURIA RESULT DOCUMENTED/REVIEW: ICD-10-PCS | Mod: CPTII,S$GLB,, | Performed by: ORTHOPAEDIC SURGERY

## 2023-08-29 PROCEDURE — 3044F HG A1C LEVEL LT 7.0%: CPT | Mod: CPTII,S$GLB,, | Performed by: ORTHOPAEDIC SURGERY

## 2023-08-29 PROCEDURE — 1125F PR PAIN SEVERITY QUANTIFIED, PAIN PRESENT: ICD-10-PCS | Mod: CPTII,S$GLB,, | Performed by: ORTHOPAEDIC SURGERY

## 2023-08-29 PROCEDURE — 3288F PR FALLS RISK ASSESSMENT DOCUMENTED: ICD-10-PCS | Mod: CPTII,S$GLB,, | Performed by: ORTHOPAEDIC SURGERY

## 2023-08-29 PROCEDURE — 99213 OFFICE O/P EST LOW 20 MIN: CPT | Mod: S$GLB,,, | Performed by: ORTHOPAEDIC SURGERY

## 2023-08-29 PROCEDURE — 1160F PR REVIEW ALL MEDS BY PRESCRIBER/CLIN PHARMACIST DOCUMENTED: ICD-10-PCS | Mod: CPTII,S$GLB,, | Performed by: ORTHOPAEDIC SURGERY

## 2023-08-29 PROCEDURE — 3061F NEG MICROALBUMINURIA REV: CPT | Mod: CPTII,S$GLB,, | Performed by: ORTHOPAEDIC SURGERY

## 2023-08-29 PROCEDURE — 3044F PR MOST RECENT HEMOGLOBIN A1C LEVEL <7.0%: ICD-10-PCS | Mod: CPTII,S$GLB,, | Performed by: ORTHOPAEDIC SURGERY

## 2023-08-29 PROCEDURE — 1159F MED LIST DOCD IN RCRD: CPT | Mod: CPTII,S$GLB,, | Performed by: ORTHOPAEDIC SURGERY

## 2023-08-29 PROCEDURE — 3066F PR DOCUMENTATION OF TREATMENT FOR NEPHROPATHY: ICD-10-PCS | Mod: CPTII,S$GLB,, | Performed by: ORTHOPAEDIC SURGERY

## 2023-08-29 PROCEDURE — 1159F PR MEDICATION LIST DOCUMENTED IN MEDICAL RECORD: ICD-10-PCS | Mod: CPTII,S$GLB,, | Performed by: ORTHOPAEDIC SURGERY

## 2023-08-29 NOTE — PROGRESS NOTES
[facility] ORTHOPEDICS    Subjective:     Chief Complaint:   Chief Complaint   Patient presents with    Left Knee - Pain     Left knee pain follow up. Here for MRI results       Past Medical History:   Diagnosis Date    Basal cell carcinoma     Diabetes mellitus, type 2     History of colonic polyps     HTN (hypertension)     Hyperlipidemia        Past Surgical History:   Procedure Laterality Date    BREAST BIOPSY      COLONOSCOPY  05/2017    EYE SURGERY Right 05/2021    TRIGGER FINGER RELEASE Left 10/26/2022    Procedure: RELEASE, TRIGGER FINGER;  Surgeon: Sachin Tong MD;  [facility];  Service: Orthopedics;  Laterality: Left;       Current Outpatient Medications   Medication Sig    calcium carbonate (OS-SANDEEP) 500 mg calcium (1,250 mg) chewable tablet Take 1 tablet by mouth once daily.    losartan (COZAAR) 50 MG tablet Take 1 tablet (50 mg total) by mouth once daily.    metFORMIN (GLUCOPHAGE) 500 MG tablet Take 1 tablet (500 mg total) by mouth 2 (two) times daily with meals.    multivitamin (THERAGRAN) per tablet Take 1 tablet by mouth once daily.    rosuvastatin (CRESTOR) 10 MG tablet Take 1 tablet (10 mg total) by mouth once daily.     No current facility-administered medications for this visit.       Review of patient's allergies indicates:   Allergen Reactions    Sulfa (sulfonamide antibiotics) Rash       Family History   Problem Relation Age of Onset    Colon cancer Mother 76    Hypertension Mother     Obesity Mother     Prostate cancer Father     Breast cancer Neg Hx        Social History     Socioeconomic History    Marital status:    Occupational History    Occupation: retired   Tobacco Use    Smoking status: Never    Smokeless tobacco: Never   Substance and Sexual Activity    Alcohol use: Yes     Comment: occasional    Drug use: Never    Sexual activity: Not Currently   Social History Narrative    Live with son     Social Determinants of Health     Stress: No Stress Concern Present  (12/16/2020)    [employer] - Occupational Stress Questionnaire     Feeling of Stress : Only a little       History of present illness:  May comes in today for follow-up for her left knee.  She has had her MRI.  She comes in today with those results.  She continues to complain of pain of the left knee.  It is worse with weight-bearing activities.  She has feelings of instability and as if the knee wants to buckle and give way on her.    Review of Systems:    Constitution: Negative for chills, fever, and sweats.  Negative for unexplained weight loss.    HENT:  Negative for headaches and blurry vision.    Cardiovascular:Negative for chest pain or irregular heart beat. Negative for hypertension.    Respiratory:  Negative for cough and shortness of breath.    Gastrointestinal: Negative for abdominal pain, heartburn, melena, nausea, and vomitting.    Genitourinary:  Negative bladder incontinence and dysuria.    Musculoskeletal:  See HPI for details.     Neurological: Negative for numbness.    Psychiatric/Behavioral: Negative for depression.  The patient is not nervous/anxious.      Endocrine: Negative for polyuria    Hematologic/Lymphatic: Negative for bleeding problem.  Does not bruise/bleed easily.    Skin: Negative for poor would healing and rash    Objective:      Physical Examination:    Vital Signs:  There were no vitals filed for this visit.    Body mass index is 30.11 kg/m².    This a well-developed, well nourished patient in no acute distress.  They are alert and oriented and cooperative to examination.        Left knee exam:  Skin to left knee is clean dry and intact.  No erythema or ecchymosis.  No signs or symptoms of infection.  She is neurovascularly intact throughout the left lower extremity.  Left calf is soft and nontender.  Left knee range of motion well-preserved at 0-110 degrees.  The knee is stable to varus and valgus stresses.  She can weightbear as tolerated on left  "lower extremity.  She does have tenderness to palpation about the medial and lateral joint lines on the left knee.  She has a 1+ effusion in the left knee.      Pertinent New Results:    XRAY Report / Interpretation:   No new radiographs were taken on today's clinic visit.  However did review images and report of left knee MRI which was consistent with tearing of the medial and lateral menisci.    Assessment/Plan:      1. Left knee medial meniscus tear.    2. Left knee lateral meniscus tear.      Risks and benefits of proceeding with left knee arthroscopy with partial medial and lateral meniscectomy were discussed with her today in detail.  All of her questions were answered.  She clearly understood and wished to proceed.  Surgical consents were obtained today.    Risks of the procedure were reviewed with the patient.  This includes, but is not limited to: infection, bleeding, pain, swelling, decreased range of motion, nerve injury/damage, deep vein thrombosis, pulmonary embolism, wound dehiscence, and possible need for further surgery.    Ari Lama, Physician Assistant, served in the capacity as a "scribe" for this patient encounter.  A "face-to-face" encounter occurred with Dr. Sachin Tong on this date.  The treatment plan and medical decision-making is outlined above. Patient was seen and examined with a chaperone.       This note was created using Dragon voice recognition software that occasionally misinterpreted phrases or words.        "

## 2023-08-31 ENCOUNTER — TELEPHONE (OUTPATIENT)
Dept: ORTHOPEDICS | Facility: CLINIC | Age: 73
End: 2023-08-31

## 2023-08-31 ENCOUNTER — PATIENT MESSAGE (OUTPATIENT)
Dept: ORTHOPEDICS | Facility: CLINIC | Age: 73
End: 2023-08-31

## 2023-08-31 DIAGNOSIS — M23.301 DEGENERATIVE TEAR OF LATERAL MENISCUS OF LEFT KNEE: Primary | ICD-10-CM

## 2023-08-31 DIAGNOSIS — M23.204 DEGENERATIVE TEAR OF MEDIAL MENISCUS OF LEFT KNEE: ICD-10-CM

## 2023-08-31 NOTE — TELEPHONE ENCOUNTER
----- Message from Sofia Cooney sent at 8/31/2023 11:44 AM CDT -----  Pt called  back  to  set  up  surgery

## 2023-09-05 ENCOUNTER — TELEPHONE (OUTPATIENT)
Dept: ORTHOPEDICS | Facility: CLINIC | Age: 73
End: 2023-09-05

## 2023-09-05 NOTE — TELEPHONE ENCOUNTER
----- Message from Alicia Szymanski sent at 9/5/2023  3:18 PM CDT -----  Code #-[id number] not need auth per Dashawn with [facility]-ref#-[id number]

## 2023-09-05 NOTE — TELEPHONE ENCOUNTER
----- Message from Alicia Szymanski sent at 9/1/2023 11:33 AM CDT -----  [phone]-she would like to r/s surgery

## 2023-09-05 NOTE — TELEPHONE ENCOUNTER
----- Message from Anusha Abarca sent at 9/5/2023  9:02 AM CDT -----  Regarding: Keep Sx Date  Patient called regarding the Surgery message she left on Friday, wanted to move up her Sx date. Pt is ok keeping her Sx 9/13 date. Pt phone # [phone]. - BARRETT

## 2023-09-06 ENCOUNTER — HOSPITAL ENCOUNTER (OUTPATIENT)
Dept: PREADMISSION TESTING | Facility: HOSPITAL | Age: 73
Discharge: HOME | End: 2023-09-06
Attending: ORTHOPAEDIC SURGERY
Payer: MEDICARE

## 2023-09-06 ENCOUNTER — HOSPITAL ENCOUNTER (OUTPATIENT)
Dept: RADIOLOGY | Facility: HOSPITAL | Age: 73
Discharge: HOME | End: 2023-09-06
Attending: STUDENT IN AN ORGANIZED HEALTH CARE EDUCATION/TRAINING PROGRAM
Payer: MEDICARE

## 2023-09-06 VITALS
HEART RATE: 76 BPM | BODY MASS INDEX: 30.12 KG/M2 | HEIGHT: 63 IN | DIASTOLIC BLOOD PRESSURE: 85 MMHG | TEMPERATURE: 99 F | WEIGHT: 170 LBS | RESPIRATION RATE: 16 BRPM | OXYGEN SATURATION: 96 % | SYSTOLIC BLOOD PRESSURE: 147 MMHG

## 2023-09-06 DIAGNOSIS — M23.204 DEGENERATIVE TEAR OF MEDIAL MENISCUS OF LEFT KNEE: ICD-10-CM

## 2023-09-06 DIAGNOSIS — Z01.818 PRE-OP TESTING: Primary | ICD-10-CM

## 2023-09-06 DIAGNOSIS — M23.301 DEGENERATIVE TEAR OF LATERAL MENISCUS OF LEFT KNEE: ICD-10-CM

## 2023-09-06 DIAGNOSIS — Z01.818 PREOPERATIVE CLEARANCE: ICD-10-CM

## 2023-09-06 LAB
ALBUMIN SERPL BCP-MCNC: 4.6 G/DL (ref 3.5–5.2)
ALP SERPL-CCNC: 46 U/L (ref 55–135)
ALT SERPL W/O P-5'-P-CCNC: 23 U/L (ref 10–44)
ANION GAP SERPL CALC-SCNC: 6 MMOL/L (ref 8–16)
AST SERPL-CCNC: 28 U/L (ref 10–40)
BASOPHILS # BLD AUTO: 0.03 K/UL (ref 0–0.2)
BASOPHILS NFR BLD: 0.5 % (ref 0–1.9)
BILIRUB SERPL-MCNC: 1.3 MG/DL (ref 0.1–1)
BUN SERPL-MCNC: 17 MG/DL (ref 8–23)
CALCIUM SERPL-MCNC: 10.1 MG/DL (ref 8.7–10.5)
CHLORIDE SERPL-SCNC: 102 MMOL/L (ref 95–110)
CO2 SERPL-SCNC: 29 MMOL/L (ref 23–29)
CREAT SERPL-MCNC: 1.1 MG/DL (ref 0.5–1.4)
DIFFERENTIAL METHOD: NORMAL
EOSINOPHIL # BLD AUTO: 0.2 K/UL (ref 0–0.5)
EOSINOPHIL NFR BLD: 3.2 % (ref 0–8)
ERYTHROCYTE [DISTWIDTH] IN BLOOD BY AUTOMATED COUNT: 13.5 % (ref 11.5–14.5)
EST. GFR  (NO RACE VARIABLE): 52.7 ML/MIN/1.73 M^2
GLUCOSE SERPL-MCNC: 97 MG/DL (ref 70–110)
HCT VFR BLD AUTO: 37.9 % (ref 37–48.5)
HGB BLD-MCNC: 12.7 G/DL (ref 12–16)
IMM GRANULOCYTES # BLD AUTO: 0.03 K/UL (ref 0–0.04)
IMM GRANULOCYTES NFR BLD AUTO: 0.5 % (ref 0–0.5)
LYMPHOCYTES # BLD AUTO: 2.1 K/UL (ref 1–4.8)
LYMPHOCYTES NFR BLD: 31.5 % (ref 18–48)
MCH RBC QN AUTO: 30.1 PG (ref 27–31)
MCHC RBC AUTO-ENTMCNC: 33.5 G/DL (ref 32–36)
MCV RBC AUTO: 90 FL (ref 82–98)
MONOCYTES # BLD AUTO: 0.5 K/UL (ref 0.3–1)
MONOCYTES NFR BLD: 7.8 % (ref 4–15)
NEUTROPHILS # BLD AUTO: 3.8 K/UL (ref 1.8–7.7)
NEUTROPHILS NFR BLD: 56.5 % (ref 38–73)
NRBC BLD-RTO: 0 /100 WBC
PLATELET # BLD AUTO: 215 K/UL (ref 150–450)
PMV BLD AUTO: 10.3 FL (ref 9.2–12.9)
POTASSIUM SERPL-SCNC: 4.9 MMOL/L (ref 3.5–5.1)
PROT SERPL-MCNC: 7.7 G/DL (ref 6–8.4)
RBC # BLD AUTO: 4.22 M/UL (ref 4–5.4)
SODIUM SERPL-SCNC: 137 MMOL/L (ref 136–145)
WBC # BLD AUTO: 6.64 K/UL (ref 3.9–12.7)

## 2023-09-06 PROCEDURE — 93010 EKG 12-LEAD: ICD-10-PCS | Mod: ,,, | Performed by: INTERNAL MEDICINE

## 2023-09-06 PROCEDURE — 93005 ELECTROCARDIOGRAM TRACING: CPT | Performed by: INTERNAL MEDICINE

## 2023-09-06 PROCEDURE — 85025 COMPLETE CBC W/AUTO DIFF WBC: CPT | Performed by: ORTHOPAEDIC SURGERY

## 2023-09-06 PROCEDURE — 80053 COMPREHEN METABOLIC PANEL: CPT | Performed by: ORTHOPAEDIC SURGERY

## 2023-09-06 PROCEDURE — 71046 X-RAY EXAM CHEST 2 VIEWS: CPT | Mod: TC

## 2023-09-06 PROCEDURE — 93010 ELECTROCARDIOGRAM REPORT: CPT | Mod: ,,, | Performed by: INTERNAL MEDICINE

## 2023-09-06 NOTE — DISCHARGE INSTRUCTIONS
INSTRUCTIONS  To confirm your doctor has scheduled your surgery for:  9/13/23        Morning of surgery please check in with registration near [facility]age Entrance then proceed to Outpatient Surgery Department.    Preop nurses will call the afternoon prior to surgery between 4:00 and 6:00 PM with your final arrival time.  PLEASE NOTE:  The surgery schedule has many variables which may affect the time of your surgery case. Family members should be available if your surgery time changes. Plan to be here the day of your procedure between 4-6 hours.    TAKE ONLY THESE MEDICATIONS WITH A SMALL SIP OF WATER THE MORNING OF SURGERY: see list    DO NOT TAKE THESE MEDICATIONS 5-7 DAYS PRIOR to your procedure per your surgeon's request: ASPIRIN, ALEVE, BC powder, KARINA SELTZER, IBUPROFEN, FISH OIL, VITAMIN E, OR HERBALS   (May take Tylenol)    If you are prescribed any types of blood thinners (Aspirin, Coumadin, Plavix, Pradaxa, Xarelto, Aggrenox, Effient, Eliquis, Savasya, Brilinta or any other), please ask your surgeon how many days before scheduled procedure should you stop taking them. You may also need to verify with prescribing physician if it is OK to stop your blood thinners.      INSTRUCTIONS IMPORTANT!!  Do not eat or drink anything after midnight.  ONLY if you are diabetic, check your sugar in the morning before your procedure.  Do not smoke, vape or drink alcoholic beverages 24 hours prior to your procedure.  Shower the night before AND the morning of your procedure with a Chlorhexidine wash such as hibiclens or Dial antibacterial soap from neck down. You may use your own shampoo and face wash. This helps your skin to be as bacteria free as possible.  If you wear contact lenses, dentures, hearing aids or glasses, bring a container to put them in and give to a family member.    Please leave all jewelry, piercings and valuables at home.  DO NOT remove hair from the surgery site.   If your condition changes such as  fever, cough, etc, please notify your surgeon.   ONLY if you have been diagnosed with sleep apnea please bring your C-PAP machine.  ONLY if you wear home oxygen please bring your portable oxygen tank the day of your procedure.   ONLY for patients requiring bowel prep, written instructions will be given by your doctor's office.  ONLY if you have a neuro stimulator, please bring the controller with you the morning of surgery  Make arrangements in advance for transportation home by a responsible adult.  You must make arrangements for transportation, TAXI'S, UBER'S OR LYFTS ARE NOT ALLOWED.        If you have any questions about these instructions, call Pre-Op Admit  Nursing at [phone] or the Pre-Op Day Surgery Unit at [phone].

## 2023-09-12 DIAGNOSIS — M17.12 PRIMARY OSTEOARTHRITIS OF LEFT KNEE: ICD-10-CM

## 2023-09-12 DIAGNOSIS — M23.301 DEGENERATIVE TEAR OF LATERAL MENISCUS OF LEFT KNEE: Primary | ICD-10-CM

## 2023-09-12 DIAGNOSIS — M23.204 DEGENERATIVE TEAR OF MEDIAL MENISCUS OF LEFT KNEE: ICD-10-CM

## 2023-09-12 RX ORDER — OXYCODONE AND ACETAMINOPHEN 7.5; 325 MG/1; MG/1
1 TABLET ORAL EVERY 6 HOURS PRN
Qty: 28 TABLET | Refills: 0 | Status: SHIPPED | OUTPATIENT
Start: 2023-09-12 | End: 2023-10-19

## 2023-09-13 ENCOUNTER — ANESTHESIA EVENT (OUTPATIENT)
Dept: SURGERY | Facility: HOSPITAL | Age: 73
End: 2023-09-13
Payer: MEDICARE

## 2023-09-13 ENCOUNTER — ANESTHESIA (OUTPATIENT)
Dept: SURGERY | Facility: HOSPITAL | Age: 73
End: 2023-09-13
Payer: MEDICARE

## 2023-09-13 ENCOUNTER — HOSPITAL ENCOUNTER (OUTPATIENT)
Facility: HOSPITAL | Age: 73
Discharge: HOME | End: 2023-09-13
Attending: ORTHOPAEDIC SURGERY | Admitting: ORTHOPAEDIC SURGERY
Payer: MEDICARE

## 2023-09-13 VITALS
HEART RATE: 81 BPM | WEIGHT: 170 LBS | OXYGEN SATURATION: 98 % | DIASTOLIC BLOOD PRESSURE: 77 MMHG | BODY MASS INDEX: 30.12 KG/M2 | HEIGHT: 63 IN | SYSTOLIC BLOOD PRESSURE: 164 MMHG | TEMPERATURE: 98 F | RESPIRATION RATE: 16 BRPM

## 2023-09-13 DIAGNOSIS — M23.301 DEGENERATIVE TEAR OF LATERAL MENISCUS OF LEFT KNEE: ICD-10-CM

## 2023-09-13 DIAGNOSIS — M23.204 DEGENERATIVE TEAR OF MEDIAL MENISCUS OF LEFT KNEE: Primary | ICD-10-CM

## 2023-09-13 LAB
GLUCOSE SERPL-MCNC: 116 MG/DL (ref 70–110)
GLUCOSE SERPL-MCNC: 119 MG/DL (ref 70–110)

## 2023-09-13 PROCEDURE — 37000008 HC ANESTHESIA 1ST 15 MINUTES: Performed by: ORTHOPAEDIC SURGERY

## 2023-09-13 PROCEDURE — D9220A PRA ANESTHESIA: ICD-10-PCS | Mod: CRNA,,, | Performed by: NURSE ANESTHETIST, CERTIFIED REGISTERED

## 2023-09-13 PROCEDURE — 27200651 HC AIRWAY, LMA: Performed by: NURSE ANESTHETIST, CERTIFIED REGISTERED

## 2023-09-13 PROCEDURE — 27201423 OPTIME MED/SURG SUP & DEVICES STERILE SUPPLY: Performed by: ORTHOPAEDIC SURGERY

## 2023-09-13 PROCEDURE — 71000015 HC POSTOP RECOV 1ST HR: Performed by: ORTHOPAEDIC SURGERY

## 2023-09-13 PROCEDURE — 63600175 PHARM REV CODE 636 W HCPCS: Performed by: NURSE ANESTHETIST, CERTIFIED REGISTERED

## 2023-09-13 PROCEDURE — 63600175 PHARM REV CODE 636 W HCPCS: Performed by: STUDENT IN AN ORGANIZED HEALTH CARE EDUCATION/TRAINING PROGRAM

## 2023-09-13 PROCEDURE — 71000016 HC POSTOP RECOV ADDL HR: Performed by: ORTHOPAEDIC SURGERY

## 2023-09-13 PROCEDURE — 36000710: Performed by: ORTHOPAEDIC SURGERY

## 2023-09-13 PROCEDURE — 25000003 PHARM REV CODE 250: Performed by: NURSE ANESTHETIST, CERTIFIED REGISTERED

## 2023-09-13 PROCEDURE — 25000003 PHARM REV CODE 250: Performed by: STUDENT IN AN ORGANIZED HEALTH CARE EDUCATION/TRAINING PROGRAM

## 2023-09-13 PROCEDURE — 71000039 HC RECOVERY, EACH ADD'L HOUR: Performed by: ORTHOPAEDIC SURGERY

## 2023-09-13 PROCEDURE — D9220A PRA ANESTHESIA: ICD-10-PCS | Mod: ANES,,, | Performed by: ANESTHESIOLOGY

## 2023-09-13 PROCEDURE — D9220A PRA ANESTHESIA: Mod: ANES,,, | Performed by: ANESTHESIOLOGY

## 2023-09-13 PROCEDURE — 29880 PR KNEE SCOPE MED/LAT MENISCECTOMY: ICD-10-PCS | Mod: LT,,, | Performed by: ORTHOPAEDIC SURGERY

## 2023-09-13 PROCEDURE — 36000711: Performed by: ORTHOPAEDIC SURGERY

## 2023-09-13 PROCEDURE — D9220A PRA ANESTHESIA: Mod: CRNA,,, | Performed by: NURSE ANESTHETIST, CERTIFIED REGISTERED

## 2023-09-13 PROCEDURE — 37000009 HC ANESTHESIA EA ADD 15 MINS: Performed by: ORTHOPAEDIC SURGERY

## 2023-09-13 PROCEDURE — 82962 GLUCOSE BLOOD TEST: CPT | Performed by: ORTHOPAEDIC SURGERY

## 2023-09-13 PROCEDURE — 25000003 PHARM REV CODE 250: Performed by: ORTHOPAEDIC SURGERY

## 2023-09-13 PROCEDURE — 29880 ARTHRS KNE SRG MNISECTMY M&L: CPT | Mod: LT,,, | Performed by: ORTHOPAEDIC SURGERY

## 2023-09-13 PROCEDURE — 71000033 HC RECOVERY, INTIAL HOUR: Performed by: ORTHOPAEDIC SURGERY

## 2023-09-13 RX ORDER — HYDROCODONE BITARTRATE AND ACETAMINOPHEN 5; 325 MG/1; MG/1
1 TABLET ORAL EVERY 4 HOURS PRN
Status: CANCELLED | OUTPATIENT
Start: 2023-09-13

## 2023-09-13 RX ORDER — FENTANYL CITRATE 50 UG/ML
INJECTION, SOLUTION INTRAMUSCULAR; INTRAVENOUS
Status: DISCONTINUED | OUTPATIENT
Start: 2023-09-13 | End: 2023-09-13

## 2023-09-13 RX ORDER — HYDROMORPHONE HYDROCHLORIDE 1 MG/ML
0.2 INJECTION, SOLUTION INTRAMUSCULAR; INTRAVENOUS; SUBCUTANEOUS EVERY 5 MIN PRN
Status: DISCONTINUED | OUTPATIENT
Start: 2023-09-13 | End: 2023-09-13 | Stop reason: HOSPADM

## 2023-09-13 RX ORDER — ONDANSETRON 2 MG/ML
INJECTION INTRAMUSCULAR; INTRAVENOUS
Status: DISCONTINUED | OUTPATIENT
Start: 2023-09-13 | End: 2023-09-13

## 2023-09-13 RX ORDER — PHENYLEPHRINE HYDROCHLORIDE 10 MG/ML
INJECTION INTRAVENOUS
Status: DISCONTINUED | OUTPATIENT
Start: 2023-09-13 | End: 2023-09-13

## 2023-09-13 RX ORDER — ONDANSETRON 2 MG/ML
4 INJECTION INTRAMUSCULAR; INTRAVENOUS DAILY PRN
Status: DISCONTINUED | OUTPATIENT
Start: 2023-09-13 | End: 2023-09-13 | Stop reason: HOSPADM

## 2023-09-13 RX ORDER — SODIUM CHLORIDE, SODIUM LACTATE, POTASSIUM CHLORIDE, CALCIUM CHLORIDE 600; 310; 30; 20 MG/100ML; MG/100ML; MG/100ML; MG/100ML
INJECTION, SOLUTION INTRAVENOUS CONTINUOUS PRN
Status: DISCONTINUED | OUTPATIENT
Start: 2023-09-13 | End: 2023-09-13

## 2023-09-13 RX ORDER — OXYCODONE HYDROCHLORIDE 5 MG/1
5 TABLET ORAL
Status: DISCONTINUED | OUTPATIENT
Start: 2023-09-13 | End: 2023-09-13 | Stop reason: HOSPADM

## 2023-09-13 RX ORDER — DEXAMETHASONE SODIUM PHOSPHATE 4 MG/ML
INJECTION, SOLUTION INTRA-ARTICULAR; INTRALESIONAL; INTRAMUSCULAR; INTRAVENOUS; SOFT TISSUE
Status: DISCONTINUED | OUTPATIENT
Start: 2023-09-13 | End: 2023-09-13

## 2023-09-13 RX ORDER — DIPHENHYDRAMINE HYDROCHLORIDE 50 MG/ML
12.5 INJECTION INTRAMUSCULAR; INTRAVENOUS
Status: DISCONTINUED | OUTPATIENT
Start: 2023-09-13 | End: 2023-09-13 | Stop reason: HOSPADM

## 2023-09-13 RX ORDER — MUPIROCIN 20 MG/G
OINTMENT TOPICAL 2 TIMES DAILY
Status: CANCELLED | OUTPATIENT
Start: 2023-09-13 | End: 2023-09-18

## 2023-09-13 RX ORDER — CEFAZOLIN SODIUM 2 G/50ML
2 SOLUTION INTRAVENOUS
Status: DISCONTINUED | OUTPATIENT
Start: 2023-09-13 | End: 2023-09-13 | Stop reason: HOSPADM

## 2023-09-13 RX ORDER — BUPIVACAINE HYDROCHLORIDE AND EPINEPHRINE 5; 5 MG/ML; UG/ML
INJECTION, SOLUTION EPIDURAL; INTRACAUDAL; PERINEURAL
Status: DISCONTINUED | OUTPATIENT
Start: 2023-09-13 | End: 2023-09-13 | Stop reason: HOSPADM

## 2023-09-13 RX ORDER — LIDOCAINE HYDROCHLORIDE 20 MG/ML
INJECTION INTRAVENOUS
Status: DISCONTINUED | OUTPATIENT
Start: 2023-09-13 | End: 2023-09-13

## 2023-09-13 RX ORDER — FAMOTIDINE 10 MG/ML
INJECTION INTRAVENOUS
Status: DISCONTINUED | OUTPATIENT
Start: 2023-09-13 | End: 2023-09-13

## 2023-09-13 RX ORDER — PROPOFOL 10 MG/ML
INJECTION, EMULSION INTRAVENOUS
Status: DISCONTINUED | OUTPATIENT
Start: 2023-09-13 | End: 2023-09-13

## 2023-09-13 RX ADMIN — PHENYLEPHRINE HYDROCHLORIDE 200 MCG: 10 INJECTION INTRAVENOUS at 12:09

## 2023-09-13 RX ADMIN — FAMOTIDINE 20 MG: 10 INJECTION, SOLUTION INTRAVENOUS at 12:09

## 2023-09-13 RX ADMIN — SODIUM CHLORIDE, SODIUM LACTATE, POTASSIUM CHLORIDE, AND CALCIUM CHLORIDE: .6; .31; .03; .02 INJECTION, SOLUTION INTRAVENOUS at 12:09

## 2023-09-13 RX ADMIN — ONDANSETRON 4 MG: 2 INJECTION INTRAMUSCULAR; INTRAVENOUS at 12:09

## 2023-09-13 RX ADMIN — PHENYLEPHRINE HYDROCHLORIDE 100 MCG: 10 INJECTION INTRAVENOUS at 12:09

## 2023-09-13 RX ADMIN — DEXTROSE 2 G: 50 INJECTION, SOLUTION INTRAVENOUS at 12:09

## 2023-09-13 RX ADMIN — HYDROMORPHONE HYDROCHLORIDE 0.2 MG: 1 INJECTION, SOLUTION INTRAMUSCULAR; INTRAVENOUS; SUBCUTANEOUS at 01:09

## 2023-09-13 RX ADMIN — FENTANYL CITRATE 50 MCG: 50 INJECTION, SOLUTION INTRAMUSCULAR; INTRAVENOUS at 12:09

## 2023-09-13 RX ADMIN — LIDOCAINE HYDROCHLORIDE 50 MG: 20 INJECTION, SOLUTION INTRAVENOUS at 12:09

## 2023-09-13 RX ADMIN — DEXAMETHASONE SODIUM PHOSPHATE 8 MG: 4 INJECTION, SOLUTION INTRAMUSCULAR; INTRAVENOUS at 12:09

## 2023-09-13 RX ADMIN — OXYCODONE HYDROCHLORIDE 5 MG: 5 TABLET ORAL at 01:09

## 2023-09-13 RX ADMIN — PROPOFOL 150 MG: 10 INJECTION, EMULSION INTRAVENOUS at 12:09

## 2023-09-13 NOTE — TRANSFER OF CARE
"Anesthesia Transfer of Care Note    Patient: May Solomon    Procedure(s) Performed: Procedure(s) (LRB):  ARTHROSCOPY, KNEE, WITH PARTIAL MEDIAL AND LATERAL MENISCECTOMY, LEFT (Left)    Patient location: PACU    Anesthesia Type: general    Transport from OR: Transported from OR on room air with adequate spontaneous ventilation    Post pain: adequate analgesia    Post assessment: no apparent anesthetic complications    Post vital signs: stable    Level of consciousness: awake and alert    Nausea/Vomiting: no nausea/vomiting    Complications: none    Transfer of care protocol was followed      Last vitals:   Visit Vitals  BP (!) 148/78   Pulse 78   Temp 36.7 °C (98 °F) (Oral)   Resp 17   Ht 5' 3" (1.6 m)   Wt 77.1 kg (170 lb)   SpO2 96%   Breastfeeding No   BMI 30.11 kg/m²     "

## 2023-09-13 NOTE — PLAN OF CARE
1430- pt is alert and oriented breathing even unlabored with no complaints of pain at this time. Good pulse to left foot. LLE dressing is clean and dry with no redness or swelling noted. Pt is currently sitting up drinking fluids. 1545- pt is alert and oriented breathing even unlabored with no complaints of pain. Good pulse to LLE. LLE dressing is clean and dry with no redness or swelling. Detailed discharge instructions given to the pt.

## 2023-09-13 NOTE — ANESTHESIA PREPROCEDURE EVALUATION
09/13/2023  May Solomon is a 72 y.o., female.      Patient Active Problem List   Diagnosis    HTN (hypertension)    History of colonic polyps    Diabetes mellitus, type 2    Hyperlipidemia    Atrophic vulva    Menopausal symptom    Obesity with body mass index 30 or greater    Osteopenia       Past Surgical History:   Procedure Laterality Date    ARTHROSCOPY OF KNEE Right 2013    BREAST BIOPSY      COLONOSCOPY  05/2017    EYE SURGERY Right 05/2021    CATARACT    TRIGGER FINGER RELEASE Left 10/26/2022    Procedure: RELEASE, TRIGGER FINGER;  Surgeon: Sachin Tong MD;  Location: [facility];  Service: Orthopedics;  Laterality: Left;        Tobacco Use:  The patient  reports that she has never smoked. She has never used smokeless tobacco.     Results for orders placed or performed during the hospital encounter of 09/06/23   EKG 12-lead    Collection Time: 09/06/23  1:35 PM    Narrative    Test Reason : M23.301,M23.204,    Vent. Rate : 071 BPM     Atrial Rate : 071 BPM     P-R Int : 168 ms          QRS Dur : 078 ms      QT Int : 360 ms       P-R-T Axes : 060 040 069 degrees     QTc Int : 391 ms    Normal sinus rhythm  Normal ECG  When compared with ECG of 24-OCT-2022 13:48,  No significant change was found  Confirmed by Alex Crouch MD ([id number]) on 9/6/2023 5:10:06 PM    Referred By:             Confirmed By:Alex Crouch MD             Lab Results   Component Value Date    WBC 6.64 09/06/2023    HGB 12.7 09/06/2023    HCT 37.9 09/06/2023    MCV 90 09/06/2023     09/06/2023     BMP  Lab Results   Component Value Date     09/06/2023    K 4.9 09/06/2023     09/06/2023    CO2 29 09/06/2023    BUN 17 09/06/2023    CREATININE 1.1 09/06/2023    CALCIUM 10.1 09/06/2023    ANIONGAP 6 (L) 09/06/2023    GLU 97 09/06/2023     (H) 04/17/2023     (H) 10/24/2022       No  results found for this or any previous visit.          Pre-op Assessment    I have reviewed the Patient Summary Reports.     I have reviewed the Nursing Notes. I have reviewed the NPO Status.   I have reviewed the Medications.     Review of Systems  Anesthesia Hx:  No problems with previous Anesthesia  Denies Family Hx of Anesthesia complications.   Denies Personal Hx of Anesthesia complications.   Social:  Non-Smoker    Hematology/Oncology:  Hematology Normal   Oncology Normal     EENT/Dental:EENT/Dental Normal   Cardiovascular:   Hypertension hyperlipidemia ECG has been reviewed.    Pulmonary:  Pulmonary Normal    Renal/:  Renal/ Normal     Hepatic/GI:  Hepatic/GI Normal    Musculoskeletal:  Musculoskeletal Normal    Neurological:  Neurology Normal    Endocrine:   Diabetes, type 2  Obesity / BMI > 30  Psych:  Psychiatric Normal           Physical Exam  General: Well nourished, Cooperative, Alert and Oriented    Airway:  Mallampati: II   Mouth Opening: Normal  TM Distance: Normal  Tongue: Normal  Neck ROM: Normal ROM    Dental:  Intact    Chest/Lungs:  Clear to auscultation    Heart:  Rate: Normal  Rhythm: Regular Rhythm  Sounds: Normal        Anesthesia Plan  Type of Anesthesia, risks & benefits discussed:    Anesthesia Type: Gen Supraglottic Airway  Intra-op Monitoring Plan: Standard ASA Monitors  Post Op Pain Control Plan: multimodal analgesia  Informed Consent: Informed consent signed with the Patient and all parties understand the risks and agree with anesthesia plan.  All questions answered.   ASA Score: 2  Anesthesia Plan Notes: LMA   Vadim Marsh    Ready For Surgery From Anesthesia Perspective.     .

## 2023-09-13 NOTE — ANESTHESIA POSTPROCEDURE EVALUATION
Anesthesia Post Evaluation    Patient: May Solomon    Procedure(s) Performed: Procedure(s) (LRB):  ARTHROSCOPY, KNEE, WITH  MEDIAL MENISCECTOMY (Left)    Final Anesthesia Type: general      Patient location during evaluation: PACU  Patient participation: Yes- Able to Participate  Level of consciousness: awake and alert and oriented  Post-procedure vital signs: reviewed and stable  Pain management: adequate  Airway patency: patent    PONV status at discharge: No PONV  Anesthetic complications: no      Cardiovascular status: blood pressure returned to baseline and hemodynamically stable  Respiratory status: unassisted, spontaneous ventilation and room air  Hydration status: euvolemic  Follow-up not needed.          Vitals Value Taken Time   /70 09/13/23 1400   Temp 36.4 °C (97.6 °F) 09/13/23 1345   Pulse 60 09/13/23 1404   Resp 11 09/13/23 1404   SpO2 100 % 09/13/23 1404   Vitals shown include unvalidated device data.      No case tracking events are documented in the log.      Pain/Blessing Score: Pain Rating Prior to Med Admin: 5 (states 3 but facial grimace says more like a 5) (9/13/2023  1:54 PM)  Blessing Score: 9 (9/13/2023  1:45 PM)

## 2023-09-13 NOTE — OP NOTE
[facility]  Surgery Department  Operative Note    SUMMARY     Date of Procedure: 9/13/2023     Procedure:  Partial medial and lateral meniscectomies arthroscopic left    Surgeon(s) and Role:     * Sachin Tong MD - Primary    Assisting Surgeon:  Destinee    Pre-Operative Diagnosis: Degenerative tear of lateral meniscus of left knee [M23.301]  Degenerative tear of medial meniscus of left knee [M23.204]    Post-Operative Diagnosis: Post-Op Diagnosis Codes:     * Degenerative tear of lateral meniscus of left knee [M23.301]     * Degenerative tear of medial meniscus of left knee [M23.204]    Anesthesia: General    Intraoperative Findings:  The patellofemoral joint was noted to track centrally.  The cartilaginous surfaces were intact.  The ACL and PCL were intact.  The lateral compartment demonstrated a shredded posterior horn lateral meniscal tear.  There was grade 2 and 3 chondromalacia on the lateral femoral condyle.  The posterior aspect of the lateral tibial plateau demonstrated grade 4 chondromalacia.  The medial compartment demonstrated a shredded posterior horn medial meniscal tear.  There was grade 2 and 3 chondromalacia throughout.      Description of the Findings of the Procedure:  Patient was placed on the operative table in supine position.  The knee was prepped and draped in the usual sterile manner for surgery.  Inferomedial and inferolateral portals were established and diagnostic arthroscopy was undertaken.  The findings of those stated above.  At this point I turned my attention to the medial meniscus.  A series of Lolita and biters were utilized meniscus was taken back to stable rim.  Approximately 70% of the posterior horn was excised.  Similarly the lateral meniscus was addressed with a series of Lolita and biters.  Approximately 80% of the lateral meniscus was excised.  We now spent a lot of time shaving out loose fragments that had been created during arthroscopy.  At this point we  removed the arthroscopic equipment.  The portals were closed with nylon stitches.  Sterile dressings were applied and the patient was noted to be in stable condition    Complications: No    Estimated Blood Loss (EBL): * No values recorded between 9/13/2023 12:00 AM and 9/13/2023 12:50 PM *           Implants: * No implants in log *    Specimens:   Specimen (24h ago, onward)      None                    Condition: Good    Disposition: PACU - hemodynamically stable.              Discharge Note    SUMMARY     Admit Date: 9/13/2023    Discharge Date and Time:  09/13/2023 12:50 PM    Hospital Course (synopsis of major diagnoses, care, treatment, and services provided during the course of the hospital stay): Uneventful      Final Diagnosis: Post-Op Diagnosis Codes:     * Degenerative tear of lateral meniscus of left knee [M23.301]     * Degenerative tear of medial meniscus of left knee [M23.204]    Disposition: Home or Self Care    Follow Up/Patient Instructions:     Medications:  Reconciled Home Medications:   Current Discharge Medication List        CONTINUE these medications which have NOT CHANGED    Details   calcium carbonate (OS-SANDEEP) 500 mg calcium (1,250 mg) chewable tablet Take 1 tablet by mouth once daily.      losartan (COZAAR) 50 MG tablet Take 1 tablet (50 mg total) by mouth once daily.  Qty: 90 tablet, Refills: 1    Comments: .  Associated Diagnoses: Essential hypertension      metFORMIN (GLUCOPHAGE) 500 MG tablet Take 1 tablet (500 mg total) by mouth 2 (two) times daily with meals.  Qty: 180 tablet, Refills: 1    Associated Diagnoses: Type 2 diabetes mellitus with stage 3a chronic kidney disease, without long-term current use of insulin      multivitamin (THERAGRAN) per tablet Take 1 tablet by mouth once daily.      rosuvastatin (CRESTOR) 10 MG tablet Take 1 tablet (10 mg total) by mouth once daily.  Qty: 90 tablet, Refills: 1    Associated Diagnoses: Type 2 diabetes mellitus with stage 3a chronic kidney  disease, without long-term current use of insulin; Pure hypercholesterolemia      oxyCODONE-acetaminophen (PERCOCET) 7.5-325 mg per tablet Take 1 tablet by mouth every 6 (six) hours as needed for Pain.  Qty: 28 tablet, Refills: 0    Associated Diagnoses: Degenerative tear of lateral meniscus of left knee; Degenerative tear of medial meniscus of left knee; Primary osteoarthritis of left knee           Discharge Procedure Orders   Diet general     Activity as tolerated     Sponge bath only until clinic visit     Ice to affected area     Weight bearing restrictions (specify)     Remove dressing in 24 hours     Call MD for:  temperature >100.4     Call MD for:  persistent nausea and vomiting     Call MD for:  severe uncontrolled pain     Call MD for:  difficulty breathing, headache or visual disturbances     Call MD for:  redness, tenderness, or signs of infection (pain, swelling, redness, odor or green/yellow discharge around incision site)     Call MD for:  hives     Call MD for:  persistent dizziness or light-headedness     Call MD for:  extreme fatigue     Shower on day dressing removed (No bath)

## 2023-09-14 ENCOUNTER — OFFICE VISIT (OUTPATIENT)
Dept: ORTHOPEDICS | Facility: CLINIC | Age: 73
End: 2023-09-14
Payer: MEDICARE

## 2023-09-14 VITALS
SYSTOLIC BLOOD PRESSURE: 130 MMHG | BODY MASS INDEX: 30.12 KG/M2 | HEIGHT: 63 IN | DIASTOLIC BLOOD PRESSURE: 70 MMHG | WEIGHT: 170 LBS

## 2023-09-14 DIAGNOSIS — Z98.890 STATUS POST ARTHROSCOPY OF LEFT KNEE: Primary | ICD-10-CM

## 2023-09-14 PROCEDURE — 1101F PR PT FALLS ASSESS DOC 0-1 FALLS W/OUT INJ PAST YR: ICD-10-PCS | Mod: CPTII,S$GLB,, | Performed by: ORTHOPAEDIC SURGERY

## 2023-09-14 PROCEDURE — 3075F SYST BP GE 130 - 139MM HG: CPT | Mod: CPTII,S$GLB,, | Performed by: ORTHOPAEDIC SURGERY

## 2023-09-14 PROCEDURE — 1125F PR PAIN SEVERITY QUANTIFIED, PAIN PRESENT: ICD-10-PCS | Mod: CPTII,S$GLB,, | Performed by: ORTHOPAEDIC SURGERY

## 2023-09-14 PROCEDURE — 3075F PR MOST RECENT SYSTOLIC BLOOD PRESS GE 130-139MM HG: ICD-10-PCS | Mod: CPTII,S$GLB,, | Performed by: ORTHOPAEDIC SURGERY

## 2023-09-14 PROCEDURE — 3066F NEPHROPATHY DOC TX: CPT | Mod: CPTII,S$GLB,, | Performed by: ORTHOPAEDIC SURGERY

## 2023-09-14 PROCEDURE — 1159F PR MEDICATION LIST DOCUMENTED IN MEDICAL RECORD: ICD-10-PCS | Mod: CPTII,S$GLB,, | Performed by: ORTHOPAEDIC SURGERY

## 2023-09-14 PROCEDURE — 3061F NEG MICROALBUMINURIA REV: CPT | Mod: CPTII,S$GLB,, | Performed by: ORTHOPAEDIC SURGERY

## 2023-09-14 PROCEDURE — 3288F FALL RISK ASSESSMENT DOCD: CPT | Mod: CPTII,S$GLB,, | Performed by: ORTHOPAEDIC SURGERY

## 2023-09-14 PROCEDURE — 3078F PR MOST RECENT DIASTOLIC BLOOD PRESSURE < 80 MM HG: ICD-10-PCS | Mod: CPTII,S$GLB,, | Performed by: ORTHOPAEDIC SURGERY

## 2023-09-14 PROCEDURE — 3066F PR DOCUMENTATION OF TREATMENT FOR NEPHROPATHY: ICD-10-PCS | Mod: CPTII,S$GLB,, | Performed by: ORTHOPAEDIC SURGERY

## 2023-09-14 PROCEDURE — 4010F ACE/ARB THERAPY RXD/TAKEN: CPT | Mod: CPTII,S$GLB,, | Performed by: ORTHOPAEDIC SURGERY

## 2023-09-14 PROCEDURE — 99024 POSTOP FOLLOW-UP VISIT: CPT | Mod: S$GLB,,, | Performed by: ORTHOPAEDIC SURGERY

## 2023-09-14 PROCEDURE — 3044F HG A1C LEVEL LT 7.0%: CPT | Mod: CPTII,S$GLB,, | Performed by: ORTHOPAEDIC SURGERY

## 2023-09-14 PROCEDURE — 4010F PR ACE/ARB THEARPY RXD/TAKEN: ICD-10-PCS | Mod: CPTII,S$GLB,, | Performed by: ORTHOPAEDIC SURGERY

## 2023-09-14 PROCEDURE — 3008F BODY MASS INDEX DOCD: CPT | Mod: CPTII,S$GLB,, | Performed by: ORTHOPAEDIC SURGERY

## 2023-09-14 PROCEDURE — 1159F MED LIST DOCD IN RCRD: CPT | Mod: CPTII,S$GLB,, | Performed by: ORTHOPAEDIC SURGERY

## 2023-09-14 PROCEDURE — 3078F DIAST BP <80 MM HG: CPT | Mod: CPTII,S$GLB,, | Performed by: ORTHOPAEDIC SURGERY

## 2023-09-14 PROCEDURE — 1125F AMNT PAIN NOTED PAIN PRSNT: CPT | Mod: CPTII,S$GLB,, | Performed by: ORTHOPAEDIC SURGERY

## 2023-09-14 PROCEDURE — 3061F PR NEG MICROALBUMINURIA RESULT DOCUMENTED/REVIEW: ICD-10-PCS | Mod: CPTII,S$GLB,, | Performed by: ORTHOPAEDIC SURGERY

## 2023-09-14 PROCEDURE — 3008F PR BODY MASS INDEX (BMI) DOCUMENTED: ICD-10-PCS | Mod: CPTII,S$GLB,, | Performed by: ORTHOPAEDIC SURGERY

## 2023-09-14 PROCEDURE — 99024 PR POST-OP FOLLOW-UP VISIT: ICD-10-PCS | Mod: S$GLB,,, | Performed by: ORTHOPAEDIC SURGERY

## 2023-09-14 PROCEDURE — 1101F PT FALLS ASSESS-DOCD LE1/YR: CPT | Mod: CPTII,S$GLB,, | Performed by: ORTHOPAEDIC SURGERY

## 2023-09-14 PROCEDURE — 1160F RVW MEDS BY RX/DR IN RCRD: CPT | Mod: CPTII,S$GLB,, | Performed by: ORTHOPAEDIC SURGERY

## 2023-09-14 PROCEDURE — 1160F PR REVIEW ALL MEDS BY PRESCRIBER/CLIN PHARMACIST DOCUMENTED: ICD-10-PCS | Mod: CPTII,S$GLB,, | Performed by: ORTHOPAEDIC SURGERY

## 2023-09-14 PROCEDURE — 3044F PR MOST RECENT HEMOGLOBIN A1C LEVEL <7.0%: ICD-10-PCS | Mod: CPTII,S$GLB,, | Performed by: ORTHOPAEDIC SURGERY

## 2023-09-14 PROCEDURE — 3288F PR FALLS RISK ASSESSMENT DOCUMENTED: ICD-10-PCS | Mod: CPTII,S$GLB,, | Performed by: ORTHOPAEDIC SURGERY

## 2023-09-14 NOTE — PROGRESS NOTES
[facility]    Subjective:     Chief Complaint:   Chief Complaint   Patient presents with    Left Knee - Post-op Evaluation     POD 1 Left knee scope 9/13/23       Past Medical History:   Diagnosis Date    Basal cell carcinoma     Diabetes mellitus, type 2     History of colonic polyps     HTN (hypertension)     Hyperlipidemia        Past Surgical History:   Procedure Laterality Date    ARTHROSCOPY OF KNEE Right 2013    BREAST BIOPSY      COLONOSCOPY  05/2017    EYE SURGERY Right 05/2021    CATARACT    TRIGGER FINGER RELEASE Left 10/26/2022    Procedure: RELEASE, TRIGGER FINGER;  Surgeon: Sachin Tong MD;  Location: [facility];  Service: Orthopedics;  Laterality: Left;       Current Outpatient Medications   Medication Sig    calcium carbonate (OS-SANDEEP) 500 mg calcium (1,250 mg) chewable tablet Take 1 tablet by mouth once daily.    losartan (COZAAR) 50 MG tablet Take 1 tablet (50 mg total) by mouth once daily.    metFORMIN (GLUCOPHAGE) 500 MG tablet Take 1 tablet (500 mg total) by mouth 2 (two) times daily with meals.    multivitamin (THERAGRAN) per tablet Take 1 tablet by mouth once daily.    oxyCODONE-acetaminophen (PERCOCET) 7.5-325 mg per tablet Take 1 tablet by mouth every 6 (six) hours as needed for Pain.    rosuvastatin (CRESTOR) 10 MG tablet Take 1 tablet (10 mg total) by mouth once daily.     No current facility-administered medications for this visit.       Review of patient's allergies indicates:   Allergen Reactions    Sulfa (sulfonamide antibiotics) Rash       Family History   Problem Relation Age of Onset    Colon cancer Mother 76    Hypertension Mother     Obesity Mother     Prostate cancer Father     Breast cancer Neg Hx        Social History     Socioeconomic History    Marital status:    Occupational History    Occupation: retired   Tobacco Use    Smoking status: Never    Smokeless tobacco: Never   Substance and Sexual Activity    Alcohol use: Yes     Comment: occasional    Drug use:  Never    Sexual activity: Not Currently   Social History Narrative    Live with son     Social Determinants of Health     Stress: No Stress Concern Present (12/16/2020)    [employer] - Occupational Stress Questionnaire     Feeling of Stress : Only a little       History of present illness:  Patient returns status post arthroscopy of the knee.  She is doing very well      Review of Systems:    Constitution: Negative for chills, fever, and sweats.  Negative for unexplained weight loss.    HENT:  Negative for headaches and blurry vision.    Cardiovascular:Negative for chest pain or irregular heart beat. Negative for hypertension.    Respiratory:  Negative for cough and shortness of breath.    Gastrointestinal: Negative for abdominal pain, heartburn, melena, nausea, and vomitting.    Genitourinary:  Negative bladder incontinence and dysuria.    Musculoskeletal:  See HPI for details.     Neurological: Negative for numbness.    Psychiatric/Behavioral: Negative for depression.  The patient is not nervous/anxious.      Endocrine: Negative for polyuria    Hematologic/Lymphatic: Negative for bleeding problem.  Does not bruise/bleed easily.    Skin: Negative for poor would healing and rash    Objective:      Physical Examination:    Vital Signs:    Vitals:    09/14/23 0744   BP: 130/70       Body mass index is 30.11 kg/m².    This a well-developed, well nourished patient in no acute distress.  They are alert and oriented and cooperative to examination.        .  Wounds are clean dry and intact.  Calf is soft.  Foot is well-perfused.  Homans'sign is negative.  Pertinent New Results:    XRAY Report / Interpretation:       Assessment/Plan:      Status post arthroscopy of the knee.  I went over findings with in great detail.  Start her on gentle range of motion.  Follow-up in a week for suture removal      This note was created using Dragon voice recognition software that occasionally misinterpreted  phrases or words.

## 2023-09-19 LAB
LEFT EYE DM RETINOPATHY: POSITIVE
RIGHT EYE DM RETINOPATHY: POSITIVE

## 2023-09-20 ENCOUNTER — OFFICE VISIT (OUTPATIENT)
Dept: FAMILY MEDICINE | Facility: CLINIC | Age: 73
End: 2023-09-20
Payer: MEDICARE

## 2023-09-20 VITALS
BODY MASS INDEX: 30.12 KG/M2 | DIASTOLIC BLOOD PRESSURE: 56 MMHG | WEIGHT: 170 LBS | TEMPERATURE: 98 F | HEART RATE: 78 BPM | SYSTOLIC BLOOD PRESSURE: 110 MMHG | HEIGHT: 63 IN | OXYGEN SATURATION: 97 %

## 2023-09-20 DIAGNOSIS — I10 ESSENTIAL HYPERTENSION: ICD-10-CM

## 2023-09-20 DIAGNOSIS — N18.31 TYPE 2 DIABETES MELLITUS WITH STAGE 3A CHRONIC KIDNEY DISEASE, WITHOUT LONG-TERM CURRENT USE OF INSULIN: Primary | ICD-10-CM

## 2023-09-20 DIAGNOSIS — Z12.31 VISIT FOR SCREENING MAMMOGRAM: ICD-10-CM

## 2023-09-20 DIAGNOSIS — E11.22 TYPE 2 DIABETES MELLITUS WITH STAGE 3A CHRONIC KIDNEY DISEASE, WITHOUT LONG-TERM CURRENT USE OF INSULIN: Primary | ICD-10-CM

## 2023-09-20 DIAGNOSIS — E78.00 PURE HYPERCHOLESTEROLEMIA: ICD-10-CM

## 2023-09-20 LAB — HBA1C MFR BLD: 6.7 %

## 2023-09-20 PROCEDURE — 99214 PR OFFICE/OUTPT VISIT, EST, LEVL IV, 30-39 MIN: ICD-10-PCS | Mod: S$GLB,,, | Performed by: INTERNAL MEDICINE

## 2023-09-20 PROCEDURE — 3044F PR MOST RECENT HEMOGLOBIN A1C LEVEL <7.0%: ICD-10-PCS | Mod: CPTII,S$GLB,, | Performed by: INTERNAL MEDICINE

## 2023-09-20 PROCEDURE — 3008F BODY MASS INDEX DOCD: CPT | Mod: CPTII,S$GLB,, | Performed by: INTERNAL MEDICINE

## 2023-09-20 PROCEDURE — 83036 POCT HEMOGLOBIN A1C: ICD-10-PCS | Mod: QW,,, | Performed by: INTERNAL MEDICINE

## 2023-09-20 PROCEDURE — 3066F NEPHROPATHY DOC TX: CPT | Mod: CPTII,S$GLB,, | Performed by: INTERNAL MEDICINE

## 2023-09-20 PROCEDURE — 3061F PR NEG MICROALBUMINURIA RESULT DOCUMENTED/REVIEW: ICD-10-PCS | Mod: CPTII,S$GLB,, | Performed by: INTERNAL MEDICINE

## 2023-09-20 PROCEDURE — 3288F PR FALLS RISK ASSESSMENT DOCUMENTED: ICD-10-PCS | Mod: CPTII,S$GLB,, | Performed by: INTERNAL MEDICINE

## 2023-09-20 PROCEDURE — 3288F FALL RISK ASSESSMENT DOCD: CPT | Mod: CPTII,S$GLB,, | Performed by: INTERNAL MEDICINE

## 2023-09-20 PROCEDURE — 3044F HG A1C LEVEL LT 7.0%: CPT | Mod: CPTII,S$GLB,, | Performed by: INTERNAL MEDICINE

## 2023-09-20 PROCEDURE — 1125F AMNT PAIN NOTED PAIN PRSNT: CPT | Mod: CPTII,S$GLB,, | Performed by: INTERNAL MEDICINE

## 2023-09-20 PROCEDURE — 99214 OFFICE O/P EST MOD 30 MIN: CPT | Mod: S$GLB,,, | Performed by: INTERNAL MEDICINE

## 2023-09-20 PROCEDURE — 3061F NEG MICROALBUMINURIA REV: CPT | Mod: CPTII,S$GLB,, | Performed by: INTERNAL MEDICINE

## 2023-09-20 PROCEDURE — 83036 HEMOGLOBIN GLYCOSYLATED A1C: CPT | Mod: QW,,, | Performed by: INTERNAL MEDICINE

## 2023-09-20 PROCEDURE — 3066F PR DOCUMENTATION OF TREATMENT FOR NEPHROPATHY: ICD-10-PCS | Mod: CPTII,S$GLB,, | Performed by: INTERNAL MEDICINE

## 2023-09-20 PROCEDURE — 3008F PR BODY MASS INDEX (BMI) DOCUMENTED: ICD-10-PCS | Mod: CPTII,S$GLB,, | Performed by: INTERNAL MEDICINE

## 2023-09-20 PROCEDURE — 1101F PR PT FALLS ASSESS DOC 0-1 FALLS W/OUT INJ PAST YR: ICD-10-PCS | Mod: CPTII,S$GLB,, | Performed by: INTERNAL MEDICINE

## 2023-09-20 PROCEDURE — 1159F MED LIST DOCD IN RCRD: CPT | Mod: CPTII,S$GLB,, | Performed by: INTERNAL MEDICINE

## 2023-09-20 PROCEDURE — 4010F ACE/ARB THERAPY RXD/TAKEN: CPT | Mod: CPTII,S$GLB,, | Performed by: INTERNAL MEDICINE

## 2023-09-20 PROCEDURE — 1125F PR PAIN SEVERITY QUANTIFIED, PAIN PRESENT: ICD-10-PCS | Mod: CPTII,S$GLB,, | Performed by: INTERNAL MEDICINE

## 2023-09-20 PROCEDURE — 1101F PT FALLS ASSESS-DOCD LE1/YR: CPT | Mod: CPTII,S$GLB,, | Performed by: INTERNAL MEDICINE

## 2023-09-20 PROCEDURE — 4010F PR ACE/ARB THEARPY RXD/TAKEN: ICD-10-PCS | Mod: CPTII,S$GLB,, | Performed by: INTERNAL MEDICINE

## 2023-09-20 PROCEDURE — 3074F SYST BP LT 130 MM HG: CPT | Mod: CPTII,S$GLB,, | Performed by: INTERNAL MEDICINE

## 2023-09-20 PROCEDURE — 1159F PR MEDICATION LIST DOCUMENTED IN MEDICAL RECORD: ICD-10-PCS | Mod: CPTII,S$GLB,, | Performed by: INTERNAL MEDICINE

## 2023-09-20 PROCEDURE — 3078F DIAST BP <80 MM HG: CPT | Mod: CPTII,S$GLB,, | Performed by: INTERNAL MEDICINE

## 2023-09-20 PROCEDURE — 3074F PR MOST RECENT SYSTOLIC BLOOD PRESSURE < 130 MM HG: ICD-10-PCS | Mod: CPTII,S$GLB,, | Performed by: INTERNAL MEDICINE

## 2023-09-20 PROCEDURE — 3078F PR MOST RECENT DIASTOLIC BLOOD PRESSURE < 80 MM HG: ICD-10-PCS | Mod: CPTII,S$GLB,, | Performed by: INTERNAL MEDICINE

## 2023-09-20 RX ORDER — METFORMIN HYDROCHLORIDE 500 MG/1
500 TABLET ORAL 2 TIMES DAILY WITH MEALS
Qty: 180 TABLET | Refills: 1 | Status: SHIPPED | OUTPATIENT
Start: 2023-09-20 | End: 2024-02-21 | Stop reason: SDUPTHER

## 2023-09-20 RX ORDER — LOSARTAN POTASSIUM 50 MG/1
50 TABLET ORAL DAILY
Qty: 90 TABLET | Refills: 1 | Status: SHIPPED | OUTPATIENT
Start: 2023-09-20 | End: 2024-02-21 | Stop reason: SDUPTHER

## 2023-09-20 RX ORDER — ROSUVASTATIN CALCIUM 10 MG/1
10 TABLET, COATED ORAL DAILY
Qty: 90 TABLET | Refills: 1 | Status: SHIPPED | OUTPATIENT
Start: 2023-09-20 | End: 2024-02-21 | Stop reason: SDUPTHER

## 2023-09-20 NOTE — PROGRESS NOTES
Subjective:       Patient ID: May Solomon is a 72 y.o. female.    Chief Complaint: Diabetes (5 months follow up), Hypertension, and Hyperlipidemia    Here for routine follow up; last visit note, most recent available labs, and health maintenance topics reviewed.   She had meniscus repair on left knee last week.    Diabetes  She presents for her follow-up diabetic visit. She has type 2 diabetes mellitus. No MedicAlert identification noted. The initial diagnosis of diabetes was made 18 Years ago. Her disease course has been stable (has been well controlled). Pertinent negatives for hypoglycemia include no confusion, dizziness, headaches, hunger, mood changes, nervousness/anxiousness, pallor, seizures, sleepiness, speech difficulty, sweats or tremors. Pertinent negatives for diabetes include no blurred vision, no chest pain, no fatigue, no foot paresthesias, no foot ulcerations, no polydipsia, no polyphagia, no polyuria, no visual change, no weakness and no weight loss. There are no hypoglycemic complications. Pertinent negatives for hypoglycemia complications include no blackouts, no hospitalization, no nocturnal hypoglycemia, no required assistance and no required glucagon injection. Symptoms are stable. Pertinent negatives for diabetic complications include no autonomic neuropathy, CVA, heart disease, nephropathy, peripheral neuropathy, PVD or retinopathy. Risk factors for coronary artery disease include dyslipidemia, obesity, post-menopausal and diabetes mellitus. Current diabetic treatment includes oral agent (monotherapy). She is compliant with treatment all of the time. Her weight is fluctuating minimally. She is following a generally healthy diet. Meal planning includes avoidance of concentrated sweets. She has not had a previous visit with a dietitian. She participates in exercise three times a week. She monitors blood glucose at home 1-2 x per day. Blood glucose monitoring compliance is excellent.  Her home blood glucose trend is fluctuating minimally. Her breakfast blood glucose range is generally 110-130 mg/dl. An ACE inhibitor/angiotensin II receptor blocker is being taken. She does not see a podiatrist.Eye exam is current.   Hypertension  This is a chronic problem. The problem is controlled (usually 120s/80s when she checks it which isn't very often). Pertinent negatives include no anxiety, blurred vision, chest pain, headaches, malaise/fatigue, neck pain, palpitations, peripheral edema, shortness of breath or sweats. Past treatments include angiotensin blockers. There are no compliance problems.  There is no history of CVA, PVD or retinopathy.   Hyperlipidemia  This is a chronic problem. Recent lipid tests were reviewed and are normal. Exacerbating diseases include diabetes. Pertinent negatives include no chest pain, myalgias or shortness of breath. Current antihyperlipidemic treatment includes statins. There are no compliance problems.      Review of Systems   Constitutional:  Negative for activity change, appetite change, chills, diaphoresis, fatigue, fever, malaise/fatigue, unexpected weight change and weight loss.   HENT:  Negative for congestion, ear discharge, ear pain, hearing loss, mouth sores, nosebleeds, postnasal drip, rhinorrhea, sinus pressure, sinus pain, sneezing, sore throat, tinnitus, trouble swallowing and voice change.    Eyes:  Negative for blurred vision, photophobia, pain, discharge, redness, itching and visual disturbance.   Respiratory:  Negative for apnea, cough, choking, chest tightness, shortness of breath and wheezing.    Cardiovascular:  Negative for chest pain, palpitations and leg swelling.   Gastrointestinal:  Negative for abdominal distention, abdominal pain, blood in stool, constipation, diarrhea, nausea and vomiting.   Endocrine: Negative for cold intolerance, heat intolerance, polydipsia, polyphagia and polyuria.   Genitourinary:  Negative for decreased urine volume,  difficulty urinating, dyspareunia, dysuria, enuresis, frequency, genital sores, hematuria, menstrual problem, pelvic pain, urgency, vaginal bleeding, vaginal discharge and vaginal pain.   Musculoskeletal:  Positive for arthralgias (left knee) and joint swelling (left knee). Negative for back pain, gait problem, myalgias, neck pain and neck stiffness.   Skin:  Negative for pallor, rash and wound.   Allergic/Immunologic: Negative for environmental allergies, food allergies and immunocompromised state.   Neurological:  Negative for dizziness, tremors, seizures, syncope, facial asymmetry, speech difficulty, weakness, light-headedness, numbness and headaches.   Hematological:  Negative for adenopathy. Does not bruise/bleed easily.   Psychiatric/Behavioral:  Negative for confusion, decreased concentration, dysphoric mood, hallucinations, self-injury, sleep disturbance and suicidal ideas. The patient is not nervous/anxious.        Past Medical History:   Diagnosis Date    Basal cell carcinoma     Diabetes mellitus, type 2     History of colonic polyps     HTN (hypertension)     Hyperlipidemia       Past Surgical History:   Procedure Laterality Date    ARTHROSCOPY OF KNEE Right 2013    BREAST BIOPSY      COLONOSCOPY  05/2017    EYE SURGERY Right 05/2021    CATARACT    KNEE ARTHROSCOPY W/ MENISCECTOMY Left 9/13/2023    Procedure: ARTHROSCOPY, KNEE, WITH MENISCECTOMY;  Surgeon: Sachin Tong MD;  Location: [facility];  Service: Orthopedics;  Laterality: Left;    TRIGGER FINGER RELEASE Left 10/26/2022    Procedure: RELEASE, TRIGGER FINGER;  Surgeon: Sachin Tong MD;  Location: [facility];  Service: Orthopedics;  Laterality: Left;       Family History   Problem Relation Age of Onset    Colon cancer Mother 76    Hypertension Mother     Obesity Mother     Prostate cancer Father     Breast cancer Neg Hx        Social History     Socioeconomic History    Marital status:    Occupational History    Occupation: retired   Tobacco  "Use    Smoking status: Never    Smokeless tobacco: Never   Substance and Sexual Activity    Alcohol use: Yes     Comment: occasional    Drug use: Never    Sexual activity: Not Currently   Social History Narrative    Live with son     Social Determinants of Health     Stress: No Stress Concern Present (12/16/2020)    [employer] [place] [employer] - Occupational Stress Questionnaire     Feeling of Stress : Only a little       Current Outpatient Medications   Medication Sig Dispense Refill    calcium carbonate (OS-SANDEEP) 500 mg calcium (1,250 mg) chewable tablet Take 1 tablet by mouth once daily.      multivitamin (THERAGRAN) per tablet Take 1 tablet by mouth once daily.      oxyCODONE-acetaminophen (PERCOCET) 7.5-325 mg per tablet Take 1 tablet by mouth every 6 (six) hours as needed for Pain. 28 tablet 0    losartan (COZAAR) 50 MG tablet Take 1 tablet (50 mg total) by mouth once daily. 90 tablet 1    metFORMIN (GLUCOPHAGE) 500 MG tablet Take 1 tablet (500 mg total) by mouth 2 (two) times daily with meals. 180 tablet 1    rosuvastatin (CRESTOR) 10 MG tablet Take 1 tablet (10 mg total) by mouth once daily. 90 tablet 1     No current facility-administered medications for this visit.       Review of patient's allergies indicates:   Allergen Reactions    Sulfa (sulfonamide antibiotics) Rash     Objective:    HPI     Diabetes     Additional comments: 5 months follow up          Last edited by Nelson Rosario MA on 9/20/2023  2:07 PM.      Blood pressure (!) 110/56, pulse 78, temperature 97.9 °F (36.6 °C), temperature source Temporal, height 5' 3" (1.6 m), weight 77.1 kg (170 lb), SpO2 97 %. Body mass index is 30.11 kg/m².   Physical Exam  Vitals and nursing note reviewed.   Constitutional:       General: She is not in acute distress.     Appearance: She is well-developed. She is obese. She is not ill-appearing, toxic-appearing or diaphoretic.   HENT:      Head: Normocephalic and atraumatic.   Eyes:      General: " No scleral icterus.        Right eye: No discharge.         Left eye: No discharge.      Conjunctiva/sclera: Conjunctivae normal.   Neck:      Vascular: No carotid bruit.   Cardiovascular:      Rate and Rhythm: Normal rate and regular rhythm.      Pulses:           Dorsalis pedis pulses are 2+ on the right side and 2+ on the left side.      Heart sounds: Normal heart sounds. No murmur heard.  Pulmonary:      Effort: Pulmonary effort is normal. No respiratory distress.      Breath sounds: Normal breath sounds. No decreased breath sounds, wheezing, rhonchi or rales.   Abdominal:      General: There is no distension.      Palpations: Abdomen is soft.      Tenderness: There is no abdominal tenderness. There is no guarding or rebound.   Musculoskeletal:      Right lower leg: No edema.      Left lower leg: No edema.   Feet:      Right foot:      Protective Sensation: 10 sites tested.  10 sites sensed.      Skin integrity: Dry skin present. No ulcer, blister, skin breakdown, erythema, warmth or callus.      Left foot:      Protective Sensation: 10 sites tested.  10 sites sensed.      Skin integrity: Dry skin present. No ulcer, blister, skin breakdown, erythema, warmth or callus.   Skin:     General: Skin is warm and dry.   Neurological:      Mental Status: She is alert.      Motor: No tremor.   Psychiatric:         Mood and Affect: Mood normal.         Speech: Speech normal.         Behavior: Behavior normal.           Office Visit on 09/20/2023   Component Date Value Ref Range Status    Hemoglobin A1C, POC 09/20/2023 6.7  % Final   Admission on 09/13/2023, Discharged on 09/13/2023   Component Date Value Ref Range Status    POC Glucose 09/13/2023 119 (H)  70 - 110 Final    POC Glucose 09/13/2023 116 (H)  70 - 110 Final   Hospital Outpatient Visit on 09/06/2023   Component Date Value Ref Range Status    WBC 09/06/2023 6.64  3.90 - 12.70 K/uL Final    RBC 09/06/2023 4.22  4.00 - 5.40 M/uL Final    Hemoglobin 09/06/2023 12.7   12.0 - 16.0 g/dL Final    Hematocrit 09/06/2023 37.9  37.0 - 48.5 % Final    MCV 09/06/2023 90  82 - 98 fL Final    MCH 09/06/2023 30.1  27.0 - 31.0 pg Final    MCHC 09/06/2023 33.5  32.0 - 36.0 g/dL Final    RDW 09/06/2023 13.5  11.5 - 14.5 % Final    Platelets 09/06/2023 215  150 - 450 K/uL Final    MPV 09/06/2023 10.3  9.2 - 12.9 fL Final    Immature Granulocytes 09/06/2023 0.5  0.0 - 0.5 % Final    Gran # (ANC) 09/06/2023 3.8  1.8 - 7.7 K/uL Final    Immature Grans (Abs) 09/06/2023 0.03  0.00 - 0.04 K/uL Final    Comment: Mild elevation in immature granulocytes is non specific and   can be seen in a variety of conditions including stress response,   acute inflammation, trauma and pregnancy. Correlation with other   laboratory and clinical findings is essential.      Lymph # 09/06/2023 2.1  1.0 - 4.8 K/uL Final    Mono # 09/06/2023 0.5  0.3 - 1.0 K/uL Final    Eos # 09/06/2023 0.2  0.0 - 0.5 K/uL Final    Baso # 09/06/2023 0.03  0.00 - 0.20 K/uL Final    nRBC 09/06/2023 0  0 /100 WBC Final    Gran % 09/06/2023 56.5  38.0 - 73.0 % Final    Lymph % 09/06/2023 31.5  18.0 - 48.0 % Final    Mono % 09/06/2023 7.8  4.0 - 15.0 % Final    Eosinophil % 09/06/2023 3.2  0.0 - 8.0 % Final    Basophil % 09/06/2023 0.5  0.0 - 1.9 % Final    Differential Method 09/06/2023 Automated   Final    Sodium 09/06/2023 137  136 - 145 mmol/L Final    Potassium 09/06/2023 4.9  3.5 - 5.1 mmol/L Final    Chloride 09/06/2023 102  95 - 110 mmol/L Final    CO2 09/06/2023 29  23 - 29 mmol/L Final    Glucose 09/06/2023 97  70 - 110 mg/dL Final    BUN 09/06/2023 17  8 - 23 mg/dL Final    Creatinine 09/06/2023 1.1  0.5 - 1.4 mg/dL Final    Calcium 09/06/2023 10.1  8.7 - 10.5 mg/dL Final    Total Protein 09/06/2023 7.7  6.0 - 8.4 g/dL Final    Albumin 09/06/2023 4.6  3.5 - 5.2 g/dL Final    Total Bilirubin 09/06/2023 1.3 (H)  0.1 - 1.0 mg/dL Final    Comment: For infants and newborns, interpretation of results should be based  on gestational age,  weight and in agreement with clinical  observations.    Premature Infant recommended reference ranges:  Up to 24 hours.............<8.0 mg/dL  Up to 48 hours............<12.0 mg/dL  3-5 days..................<15.0 mg/dL  6-29 days.................<15.0 mg/dL      Alkaline Phosphatase 09/06/2023 46 (L)  55 - 135 U/L Final    AST 09/06/2023 28  10 - 40 U/L Final    ALT 09/06/2023 23  10 - 44 U/L Final    eGFR 09/06/2023 52.7 (A)  >60 mL/min/1.73 m^2 Final    Anion Gap 09/06/2023 6 (L)  8 - 16 mmol/L Final   ]  Assessment:       1. Type 2 diabetes mellitus with stage 3a chronic kidney disease, without long-term current use of insulin    2. Pure hypercholesterolemia    3. Essential hypertension    4. Visit for screening mammogram        Plan:       May was seen today for diabetes, hypertension and hyperlipidemia.    Diagnoses and all orders for this visit:    Type 2 diabetes mellitus with stage 3a chronic kidney disease, without long-term current use of insulin  -     rosuvastatin (CRESTOR) 10 MG tablet; Take 1 tablet (10 mg total) by mouth once daily.  -     metFORMIN (GLUCOPHAGE) 500 MG tablet; Take 1 tablet (500 mg total) by mouth 2 (two) times daily with meals.  -     Hemoglobin A1C, POCT    Pure hypercholesterolemia  -     rosuvastatin (CRESTOR) 10 MG tablet; Take 1 tablet (10 mg total) by mouth once daily.    Essential hypertension  -     losartan (COZAAR) 50 MG tablet; Take 1 tablet (50 mg total) by mouth once daily.    Visit for screening mammogram  -     Mammo Digital Screening Bilat w/ Martínez; Future

## 2023-09-21 ENCOUNTER — PATIENT OUTREACH (OUTPATIENT)
Dept: ADMINISTRATIVE | Facility: HOSPITAL | Age: 73
End: 2023-09-21
Payer: MEDICARE

## 2023-09-21 NOTE — PROGRESS NOTES
Population Health Chart Review & Patient Outreach Details:     Reason for Outreach Encounter:     [x]  Non-Compliant Report   []  Payor Report ([employer], PHN, BCBS, MSSP, MCIP, UHC, etc.)   []  Pre-Visit Chart Review     Updates Requested / Reviewed:     []  Care Everywhere    []     []  External Sources ([facility], [facility], DIS, etc.)   [x]  Care Team Updated    Patient Outreach Method:    []  Telephone Outreach Completed   [] Successful   [] Left Voicemail   [] Unable to Contact (wrong number, no voicemail)  []  [facility] Portal Outreach Sent  []  Letter Outreach Mailed  []  Fax Sent for External Records  [x]  External Records Upload    Health Maintenance Topics Addressed and Outreach Outcomes / Actions Taken:        []      Breast Cancer Screening []  Mammo Scheduled      []  External Records Requested     []  Added Reminder to Complete to Upcoming Primary Care Appt Notes     []  Patient Declined     []  Patient Will Call Back to Schedule     []  Patient Will Schedule with External Provider / Order Routed if Applicable             []       Cervical Cancer Screening []  Pap Scheduled      []  External Records Requested     []  Added Reminder to Complete to Upcoming Primary Care Appt Notes     []  Patient Declined     []  Patient Will Call Back to Schedule     []  Patient Will Schedule with External Provider               []          Colorectal Cancer Screening []  Colonoscopy Case Request or Referral Placed     []  External Records Requested     []  Added Reminder to Complete to Upcoming Primary Care Appt Notes     []  Patient Declined     []  Patient Will Call Back to Schedule     []  Patient Will Schedule with External Provider     []  Fit Kit Mailed (add the SmartPhrase under additional notes)     []  Reminded Patient to Complete Home Test             [x]      Diabetic Eye Exam []  Eye Camera Scheduled or Optometry Referral Placed     []  External Records Requested     []  Added Reminder to Complete to  Upcoming Primary Care Appt Notes     []  Patient Declined     []  Patient Will Call Back to Schedule     []  Patient Will Schedule with External Provider             []      Blood Pressure Control []  Primary Care Follow Up Visit Scheduled     []  Remote Blood Pressure Reading Captured     []  Added Reminder to Complete to Upcoming Primary Care Appt Notes     []  Patient Declined     []  Patient Will Call Back / Patient Will Send Portal Message with Reading     []  Patient Will Call Back to Schedule Provider Visit             []       HbA1c & Other Labs []  Lab Appt Scheduled for Due Labs     []  Primary Care Follow Up Visit Scheduled      []  Reminded Patient to Complete Home Test     []  Added Reminder to Complete to Upcoming Primary Care Appt Notes     []  Patient Declined     []  Patient Will Call Back to Schedule     []  Patient Will Schedule with External Provider / Order Routed if Applicable           []    Schedule Primary Care Appt []  Primary Care Appt Scheduled     []  Patient Declined     []  Patient Will Call Back to Schedule     []  Pt Established with External Provider & Updated Care Team             []      Medication Adherence []  Primary Care Appointment Scheduled     []  Added Reminder to Upcoming Primary Care Appt Notes     []  Patient Reminded to  Prescription     []  Patient Declined, Provider Notified if Needed     []  Sent Provider Message to Review and/or Add Exclusion to Problem List             []      Osteoporosis Screening []  DXA Appointment Scheduled     []  External Records Requested     []  Added Reminder to Complete to Upcoming Primary Care Appt Notes     []  Patient Declined     []  Patient Will Call Back to Schedule     []  Patient Will Schedule with External Provider / Order Routed if Applicable     Additional Care Coordinator Notes:         Further Action Needed If Patient Returns Outreach:

## 2023-09-22 ENCOUNTER — OFFICE VISIT (OUTPATIENT)
Dept: ORTHOPEDICS | Facility: CLINIC | Age: 73
End: 2023-09-22
Payer: MEDICARE

## 2023-09-22 VITALS — HEIGHT: 63 IN | BODY MASS INDEX: 30.12 KG/M2 | WEIGHT: 170 LBS

## 2023-09-22 DIAGNOSIS — M23.301 DEGENERATIVE TEAR OF LATERAL MENISCUS OF LEFT KNEE: ICD-10-CM

## 2023-09-22 DIAGNOSIS — M23.204 DEGENERATIVE TEAR OF MEDIAL MENISCUS OF LEFT KNEE: ICD-10-CM

## 2023-09-22 DIAGNOSIS — Z98.890 STATUS POST ARTHROSCOPY OF LEFT KNEE: Primary | ICD-10-CM

## 2023-09-22 PROCEDURE — 4010F PR ACE/ARB THEARPY RXD/TAKEN: ICD-10-PCS | Mod: CPTII,S$GLB,, | Performed by: PHYSICIAN ASSISTANT

## 2023-09-22 PROCEDURE — 99024 PR POST-OP FOLLOW-UP VISIT: ICD-10-PCS | Mod: S$GLB,,, | Performed by: PHYSICIAN ASSISTANT

## 2023-09-22 PROCEDURE — 1160F PR REVIEW ALL MEDS BY PRESCRIBER/CLIN PHARMACIST DOCUMENTED: ICD-10-PCS | Mod: CPTII,S$GLB,, | Performed by: PHYSICIAN ASSISTANT

## 2023-09-22 PROCEDURE — 99024 POSTOP FOLLOW-UP VISIT: CPT | Mod: S$GLB,,, | Performed by: PHYSICIAN ASSISTANT

## 2023-09-22 PROCEDURE — 3044F HG A1C LEVEL LT 7.0%: CPT | Mod: CPTII,S$GLB,, | Performed by: PHYSICIAN ASSISTANT

## 2023-09-22 PROCEDURE — 3066F NEPHROPATHY DOC TX: CPT | Mod: CPTII,S$GLB,, | Performed by: PHYSICIAN ASSISTANT

## 2023-09-22 PROCEDURE — 1159F PR MEDICATION LIST DOCUMENTED IN MEDICAL RECORD: ICD-10-PCS | Mod: CPTII,S$GLB,, | Performed by: PHYSICIAN ASSISTANT

## 2023-09-22 PROCEDURE — 3008F PR BODY MASS INDEX (BMI) DOCUMENTED: ICD-10-PCS | Mod: CPTII,S$GLB,, | Performed by: PHYSICIAN ASSISTANT

## 2023-09-22 PROCEDURE — 3288F FALL RISK ASSESSMENT DOCD: CPT | Mod: CPTII,S$GLB,, | Performed by: PHYSICIAN ASSISTANT

## 2023-09-22 PROCEDURE — 1159F MED LIST DOCD IN RCRD: CPT | Mod: CPTII,S$GLB,, | Performed by: PHYSICIAN ASSISTANT

## 2023-09-22 PROCEDURE — 3288F PR FALLS RISK ASSESSMENT DOCUMENTED: ICD-10-PCS | Mod: CPTII,S$GLB,, | Performed by: PHYSICIAN ASSISTANT

## 2023-09-22 PROCEDURE — 3008F BODY MASS INDEX DOCD: CPT | Mod: CPTII,S$GLB,, | Performed by: PHYSICIAN ASSISTANT

## 2023-09-22 PROCEDURE — 3066F PR DOCUMENTATION OF TREATMENT FOR NEPHROPATHY: ICD-10-PCS | Mod: CPTII,S$GLB,, | Performed by: PHYSICIAN ASSISTANT

## 2023-09-22 PROCEDURE — 1160F RVW MEDS BY RX/DR IN RCRD: CPT | Mod: CPTII,S$GLB,, | Performed by: PHYSICIAN ASSISTANT

## 2023-09-22 PROCEDURE — 1125F PR PAIN SEVERITY QUANTIFIED, PAIN PRESENT: ICD-10-PCS | Mod: CPTII,S$GLB,, | Performed by: PHYSICIAN ASSISTANT

## 2023-09-22 PROCEDURE — 3044F PR MOST RECENT HEMOGLOBIN A1C LEVEL <7.0%: ICD-10-PCS | Mod: CPTII,S$GLB,, | Performed by: PHYSICIAN ASSISTANT

## 2023-09-22 PROCEDURE — 3061F NEG MICROALBUMINURIA REV: CPT | Mod: CPTII,S$GLB,, | Performed by: PHYSICIAN ASSISTANT

## 2023-09-22 PROCEDURE — 4010F ACE/ARB THERAPY RXD/TAKEN: CPT | Mod: CPTII,S$GLB,, | Performed by: PHYSICIAN ASSISTANT

## 2023-09-22 PROCEDURE — 3061F PR NEG MICROALBUMINURIA RESULT DOCUMENTED/REVIEW: ICD-10-PCS | Mod: CPTII,S$GLB,, | Performed by: PHYSICIAN ASSISTANT

## 2023-09-22 PROCEDURE — 1125F AMNT PAIN NOTED PAIN PRSNT: CPT | Mod: CPTII,S$GLB,, | Performed by: PHYSICIAN ASSISTANT

## 2023-09-22 PROCEDURE — 1101F PT FALLS ASSESS-DOCD LE1/YR: CPT | Mod: CPTII,S$GLB,, | Performed by: PHYSICIAN ASSISTANT

## 2023-09-22 PROCEDURE — 1101F PR PT FALLS ASSESS DOC 0-1 FALLS W/OUT INJ PAST YR: ICD-10-PCS | Mod: CPTII,S$GLB,, | Performed by: PHYSICIAN ASSISTANT

## 2023-09-22 NOTE — PROGRESS NOTES
[facility]  [address]  [place] [address]  Phone: [phone]   [phone]    Patient's PCP:Irineo Freeman Jr., MD  Referring Provider: No ref. provider found    POST-OP Note:    Subjective:        Chief Complaint:   Chief Complaint   Patient presents with    Left Knee - Post-op Evaluation     S/p left knee scope on 9/13/23, walks with limp, says getting better each day       Past Medical History:   Diagnosis Date    Basal cell carcinoma     Diabetes mellitus, type 2     History of colonic polyps     HTN (hypertension)     Hyperlipidemia        Past Surgical History:   Procedure Laterality Date    ARTHROSCOPY OF KNEE Right 2013    BREAST BIOPSY      COLONOSCOPY  05/2017    EYE SURGERY Right 05/2021    CATARACT    KNEE ARTHROSCOPY W/ MENISCECTOMY Left 9/13/2023    Procedure: ARTHROSCOPY, KNEE, WITH MENISCECTOMY;  Surgeon: Sachin Tong MD;  Location: [facility] OR;  Service: Orthopedics;  Laterality: Left;    TRIGGER FINGER RELEASE Left 10/26/2022    Procedure: RELEASE, TRIGGER FINGER;  Surgeon: Sachin Tong MD;  Location: [facility] OR;  Service: Orthopedics;  Laterality: Left;       Current Outpatient Medications   Medication Sig    calcium carbonate (OS-SANDEEP) 500 mg calcium (1,250 mg) chewable tablet Take 1 tablet by mouth once daily.    losartan (COZAAR) 50 MG tablet Take 1 tablet (50 mg total) by mouth once daily.    metFORMIN (GLUCOPHAGE) 500 MG tablet Take 1 tablet (500 mg total) by mouth 2 (two) times daily with meals.    multivitamin (THERAGRAN) per tablet Take 1 tablet by mouth once daily.    oxyCODONE-acetaminophen (PERCOCET) 7.5-325 mg per tablet Take 1 tablet by mouth every 6 (six) hours as needed for Pain.    rosuvastatin (CRESTOR) 10 MG tablet Take 1 tablet (10 mg total) by mouth once daily.     No current facility-administered medications for this visit.       Review of patient's allergies indicates:   Allergen Reactions    Sulfa (sulfonamide antibiotics) Rash       Family  History   Problem Relation Age of Onset    Colon cancer Mother 76    Hypertension Mother     Obesity Mother     Prostate cancer Father     Breast cancer Neg Hx        Social History     Socioeconomic History    Marital status:    Occupational History    Occupation: retired   Tobacco Use    Smoking status: Never    Smokeless tobacco: Never   Substance and Sexual Activity    Alcohol use: Yes     Comment: occasional    Drug use: Never    Sexual activity: Not Currently   Social History Narrative    Live with son     Social Determinants of Health     Stress: No Stress Concern Present (12/16/2020)    [employer] - Occupational Stress Questionnaire     Feeling of Stress : Only a little       History of present illness:  May comes in today for follow-up for her left knee arthroscopy.  She is 9 days postop doing well.  She is still continues with the expected postoperative discomfort.  Comes in today for wound check and suture removal.  She is ambulating without an aid.  She was found to have medial and lateral meniscal tears.  She underwent partial medial and lateral meniscectomies.  She had grade 2-3 chondromalacia of the lateral femoral condyle and grade 4 changes on the lateral tibial plateau.  She had grade 2-3 changes throughout the medial compartment.  Patellofemoral joint cartilaginous surfaces were intact.    Review of Systems:    Musculoskeletal:  See HPI       Objective:        Physical Examination:    Vital Signs: There were no vitals filed for this visit.    Body mass index is 30.11 kg/m².    This a well-developed, well nourished patient in no acute distress.  They are alert and oriented and cooperative to examination.        Left knee exam:  Skin to left knee is clean dry and intact.  There is no erythema or ecchymosis.  Arthroscopic portals are healing well without wound dehiscence or drainage.  No signs or symptoms of infection left calf soft and nontender.  She lacks  about 5° of full extension.  She can flex to 90°.  She can weightbear as tolerated on the left lower extremity but has a slow winter with an antalgic gait.    Pertinent New Results:     XRAY Report / Interpretation:  No new radiographs were taken on today's clinic visit.     Assessment:       1. Status post arthroscopy of left knee    2. Degenerative tear of lateral meniscus of left knee    3. Degenerative tear of medial meniscus of left knee      Plan:     Status post arthroscopy of left knee    Degenerative tear of lateral meniscus of left knee    Degenerative tear of medial meniscus of left knee        Follow up in about 4 weeks (around 10/20/2023) for 4 week f/u, s/p left knee scope 9/13/23.    Sutures removed from her left knee today.  She tolerated this well.  Was advised to clean the operative site once a day with warm soapy water not apply any topical creams or ointments.  She was instructed to not submerge operative site underwater in a pool or bathtub type environment for least 48 more hours.  We will check her back in 4 weeks to see how she is responding to today's surgery.  She is having any continued discomfort, consideration of intra-articular injection with corticosteroid to address the underlying osteoarthritis may be warranted.      Ari Lama, MPAS, PA-C    This note was created using [employer] voice recognition software that occasionally misinterprets words or phrases.

## 2023-10-12 ENCOUNTER — HOSPITAL ENCOUNTER (OUTPATIENT)
Dept: RADIOLOGY | Facility: HOSPITAL | Age: 73
Discharge: HOME | End: 2023-10-12
Attending: INTERNAL MEDICINE
Payer: MEDICARE

## 2023-10-12 DIAGNOSIS — Z12.31 VISIT FOR SCREENING MAMMOGRAM: ICD-10-CM

## 2023-10-12 PROCEDURE — 77067 SCR MAMMO BI INCL CAD: CPT | Mod: TC,PO

## 2023-10-19 ENCOUNTER — OFFICE VISIT (OUTPATIENT)
Dept: ORTHOPEDICS | Facility: CLINIC | Age: 73
End: 2023-10-19
Payer: MEDICARE

## 2023-10-19 VITALS — HEIGHT: 63 IN | WEIGHT: 170 LBS | BODY MASS INDEX: 30.12 KG/M2

## 2023-10-19 DIAGNOSIS — Z98.890 STATUS POST ARTHROSCOPY OF LEFT KNEE: Primary | ICD-10-CM

## 2023-10-19 DIAGNOSIS — M17.12 PRIMARY OSTEOARTHRITIS OF LEFT KNEE: ICD-10-CM

## 2023-10-19 PROCEDURE — 1100F PTFALLS ASSESS-DOCD GE2>/YR: CPT | Mod: CPTII,S$GLB,, | Performed by: ORTHOPAEDIC SURGERY

## 2023-10-19 PROCEDURE — 3044F PR MOST RECENT HEMOGLOBIN A1C LEVEL <7.0%: ICD-10-PCS | Mod: CPTII,S$GLB,, | Performed by: ORTHOPAEDIC SURGERY

## 2023-10-19 PROCEDURE — 4010F ACE/ARB THERAPY RXD/TAKEN: CPT | Mod: CPTII,S$GLB,, | Performed by: ORTHOPAEDIC SURGERY

## 2023-10-19 PROCEDURE — 3061F NEG MICROALBUMINURIA REV: CPT | Mod: CPTII,S$GLB,, | Performed by: ORTHOPAEDIC SURGERY

## 2023-10-19 PROCEDURE — 1160F RVW MEDS BY RX/DR IN RCRD: CPT | Mod: CPTII,S$GLB,, | Performed by: ORTHOPAEDIC SURGERY

## 2023-10-19 PROCEDURE — 1125F PR PAIN SEVERITY QUANTIFIED, PAIN PRESENT: ICD-10-PCS | Mod: CPTII,S$GLB,, | Performed by: ORTHOPAEDIC SURGERY

## 2023-10-19 PROCEDURE — 4010F PR ACE/ARB THEARPY RXD/TAKEN: ICD-10-PCS | Mod: CPTII,S$GLB,, | Performed by: ORTHOPAEDIC SURGERY

## 2023-10-19 PROCEDURE — 1160F PR REVIEW ALL MEDS BY PRESCRIBER/CLIN PHARMACIST DOCUMENTED: ICD-10-PCS | Mod: CPTII,S$GLB,, | Performed by: ORTHOPAEDIC SURGERY

## 2023-10-19 PROCEDURE — 99024 PR POST-OP FOLLOW-UP VISIT: ICD-10-PCS | Mod: S$GLB,POP,, | Performed by: ORTHOPAEDIC SURGERY

## 2023-10-19 PROCEDURE — 3044F HG A1C LEVEL LT 7.0%: CPT | Mod: CPTII,S$GLB,, | Performed by: ORTHOPAEDIC SURGERY

## 2023-10-19 PROCEDURE — 1159F MED LIST DOCD IN RCRD: CPT | Mod: CPTII,S$GLB,, | Performed by: ORTHOPAEDIC SURGERY

## 2023-10-19 PROCEDURE — 99024 POSTOP FOLLOW-UP VISIT: CPT | Mod: S$GLB,POP,, | Performed by: ORTHOPAEDIC SURGERY

## 2023-10-19 PROCEDURE — 3288F FALL RISK ASSESSMENT DOCD: CPT | Mod: CPTII,S$GLB,, | Performed by: ORTHOPAEDIC SURGERY

## 2023-10-19 PROCEDURE — 1100F PR PT FALLS ASSESS DOC 2+ FALLS/FALL W/INJURY/YR: ICD-10-PCS | Mod: CPTII,S$GLB,, | Performed by: ORTHOPAEDIC SURGERY

## 2023-10-19 PROCEDURE — 3061F PR NEG MICROALBUMINURIA RESULT DOCUMENTED/REVIEW: ICD-10-PCS | Mod: CPTII,S$GLB,, | Performed by: ORTHOPAEDIC SURGERY

## 2023-10-19 PROCEDURE — 3288F PR FALLS RISK ASSESSMENT DOCUMENTED: ICD-10-PCS | Mod: CPTII,S$GLB,, | Performed by: ORTHOPAEDIC SURGERY

## 2023-10-19 PROCEDURE — 1125F AMNT PAIN NOTED PAIN PRSNT: CPT | Mod: CPTII,S$GLB,, | Performed by: ORTHOPAEDIC SURGERY

## 2023-10-19 PROCEDURE — 3066F PR DOCUMENTATION OF TREATMENT FOR NEPHROPATHY: ICD-10-PCS | Mod: CPTII,S$GLB,, | Performed by: ORTHOPAEDIC SURGERY

## 2023-10-19 PROCEDURE — 1159F PR MEDICATION LIST DOCUMENTED IN MEDICAL RECORD: ICD-10-PCS | Mod: CPTII,S$GLB,, | Performed by: ORTHOPAEDIC SURGERY

## 2023-10-19 PROCEDURE — 3066F NEPHROPATHY DOC TX: CPT | Mod: CPTII,S$GLB,, | Performed by: ORTHOPAEDIC SURGERY

## 2023-10-19 RX ORDER — TRIAMCINOLONE ACETONIDE 40 MG/ML
40 INJECTION, SUSPENSION INTRA-ARTICULAR; INTRAMUSCULAR
Status: DISCONTINUED | OUTPATIENT
Start: 2023-10-19 | End: 2023-10-19 | Stop reason: HOSPADM

## 2023-10-19 RX ADMIN — TRIAMCINOLONE ACETONIDE 40 MG: 40 INJECTION, SUSPENSION INTRA-ARTICULAR; INTRAMUSCULAR at 01:10

## 2023-10-19 NOTE — PROCEDURES
Large Joint Aspiration/Injection: L knee    Date/Time: 10/19/2023 1:30 PM    Performed by: Sachin Tong MD  Authorized by: Sachin Tong MD    Consent Done?:  Yes (Verbal)  Indications:  Arthritis and pain  Site marked: the procedure site was marked    Timeout: prior to procedure the correct patient, procedure, and site was verified    Prep: patient was prepped and draped in usual sterile fashion      Local anesthesia used?: Yes    Local anesthetic:  Lidocaine 1% without epinephrine    Details:  Needle Size:  25 G  Ultrasonic Guidance for needle placement?: No    Location:  Knee  Site:  L knee  Medications:  40 mg triamcinolone acetonide 40 mg/mL  Patient tolerance:  Patient tolerated the procedure well with no immediate complications

## 2023-10-19 NOTE — PROGRESS NOTES
[facility] ORTHOPEDICS POST-OP NOTE    Subjective:           Chief Complaint:   Chief Complaint   Patient presents with    Left Knee - Post-op Evaluation     PO Left knee scope 09/13/23. States she is progressing very slowly. States she is in constant pain       Past Medical History:   Diagnosis Date    Basal cell carcinoma     Diabetes mellitus, type 2     History of colonic polyps     HTN (hypertension)     Hyperlipidemia        Past Surgical History:   Procedure Laterality Date    ARTHROSCOPY OF KNEE Right 2013    BREAST BIOPSY      COLONOSCOPY  05/2017    EYE SURGERY Right 05/2021    CATARACT    KNEE ARTHROSCOPY W/ MENISCECTOMY Left 9/13/2023    Procedure: ARTHROSCOPY, KNEE, WITH MENISCECTOMY;  Surgeon: Sachin Tong MD;  Location: [facility] OR;  Service: Orthopedics;  Laterality: Left;    TRIGGER FINGER RELEASE Left 10/26/2022    Procedure: RELEASE, TRIGGER FINGER;  Surgeon: Sachin Tong MD;  Location: [facility] OR;  Service: Orthopedics;  Laterality: Left;       Current Outpatient Medications   Medication Sig    calcium carbonate (OS-SANDEEP) 500 mg calcium (1,250 mg) chewable tablet Take 1 tablet by mouth once daily.    losartan (COZAAR) 50 MG tablet Take 1 tablet (50 mg total) by mouth once daily.    metFORMIN (GLUCOPHAGE) 500 MG tablet Take 1 tablet (500 mg total) by mouth 2 (two) times daily with meals.    multivitamin (THERAGRAN) per tablet Take 1 tablet by mouth once daily.    rosuvastatin (CRESTOR) 10 MG tablet Take 1 tablet (10 mg total) by mouth once daily.    oxyCODONE-acetaminophen (PERCOCET) 7.5-325 mg per tablet Take 1 tablet by mouth every 6 (six) hours as needed for Pain. (Patient not taking: Reported on 10/19/2023)     No current facility-administered medications for this visit.       Review of patient's allergies indicates:   Allergen Reactions    Sulfa (sulfonamide antibiotics) Rash       Family History   Problem Relation Age of Onset    Colon cancer Mother 76    Hypertension Mother     Obesity Mother      Prostate cancer Father     Breast cancer Neg Hx        Social History     Socioeconomic History    Marital status:    Occupational History    Occupation: retired   Tobacco Use    Smoking status: Never    Smokeless tobacco: Never   Substance and Sexual Activity    Alcohol use: Yes     Comment: occasional    Drug use: Never    Sexual activity: Not Currently   Social History Narrative    Live with son     Social Determinants of Health     Stress: No Stress Concern Present (12/16/2020)    [employer] - Occupational Stress Questionnaire     Feeling of Stress : Only a little       History of present illness:  Ms. Olvera comes in today for follow-up for her left knee arthroscopy.  She is about 5 weeks postop he continues to have some discomfort.  It is not intolerable for her.  She had partial medial and lateral meniscectomies.  There were grade 2-3 changes on the lateral femoral condyle and grade 4 changes on the lateral tibial plateau.  There was grade 2-3 changes throughout the medial compartment.    Review of Systems:    Musculoskeletal:  See HPI      Objective:        Physical Examination:    Vital Signs:  There were no vitals filed for this visit.    Body mass index is 30.11 kg/m².    This a well-developed, well nourished patient in no acute distress.  They are alert and oriented and cooperative to examination.        Left knee exam:  Skin to left knee is clean dry and intact.  No erythema or ecchymosis.  Two arthroscopic portals well healed without wound dehiscence or drainage.  No signs or symptoms of infection.  She has 1+ effusion.  Left knee range of motion 5-120 degrees.  Knee is stable to varus and valgus stresses.  She is diffusely tender more so laterally versus medially.  She can weightbear as tolerated on the left lower extremity.    Pertinent New Results:    XRAY Report / Interpretation:   No new radiographs were taken on today's clinic visit.    Assessment/Plan:     "  1. Status post left knee arthroscopy with proven osteoarthritis.      To address her underlying osteoarthritis, we injected the left knee today via an anterolateral approach with 40 mg of Kenalog and lidocaine.  She tolerated this well.  We will see her back in 2 months to see how she responds.  Ultimately, she may ultimately need a total knee arthroplasty to address the osteoarthritis in the knee.    Ari Lama, Physician Assistant, served in the capacity as a "scribe" for this patient encounter.  A "face-to-face" encounter occurred with Dr. Sachin Tong on this date.  The treatment plan and medical decision-making is outlined above. Patient was seen and examined with a chaperone.     This note was created using Dragon voice recognition software that occasionally misinterpreted phrases or words.      "

## 2023-12-12 ENCOUNTER — HOSPITAL ENCOUNTER (INPATIENT)
Facility: HOSPITAL | Age: 73
LOS: 6 days | Discharge: NURSING, REHAB OR LONG-TERM CARE | DRG: 522 | End: 2023-12-18
Attending: EMERGENCY MEDICINE | Admitting: STUDENT IN AN ORGANIZED HEALTH CARE EDUCATION/TRAINING PROGRAM
Payer: MEDICARE

## 2023-12-12 DIAGNOSIS — M25.552 LEFT HIP PAIN: ICD-10-CM

## 2023-12-12 DIAGNOSIS — S72.002A CLOSED FRACTURE OF LEFT HIP, INITIAL ENCOUNTER: Primary | ICD-10-CM

## 2023-12-12 DIAGNOSIS — S72.002A CLOSED LEFT HIP FRACTURE: ICD-10-CM

## 2023-12-12 LAB
ALBUMIN SERPL BCP-MCNC: 4.5 G/DL (ref 3.5–5.2)
ALP SERPL-CCNC: 57 U/L (ref 55–135)
ALT SERPL W/O P-5'-P-CCNC: 25 U/L (ref 10–44)
ANION GAP SERPL CALC-SCNC: 10 MMOL/L (ref 8–16)
AST SERPL-CCNC: 32 U/L (ref 10–40)
BASOPHILS # BLD AUTO: 0.04 K/UL (ref 0–0.2)
BASOPHILS NFR BLD: 0.5 % (ref 0–1.9)
BILIRUB SERPL-MCNC: 1.1 MG/DL (ref 0.1–1)
BUN SERPL-MCNC: 24 MG/DL (ref 8–23)
CALCIUM SERPL-MCNC: 10.4 MG/DL (ref 8.7–10.5)
CHLORIDE SERPL-SCNC: 101 MMOL/L (ref 95–110)
CO2 SERPL-SCNC: 26 MMOL/L (ref 23–29)
CREAT SERPL-MCNC: 1.2 MG/DL (ref 0.5–1.4)
DIFFERENTIAL METHOD BLD: ABNORMAL
EOSINOPHIL # BLD AUTO: 0.1 K/UL (ref 0–0.5)
EOSINOPHIL NFR BLD: 0.7 % (ref 0–8)
ERYTHROCYTE [DISTWIDTH] IN BLOOD BY AUTOMATED COUNT: 12.9 % (ref 11.5–14.5)
EST. GFR  (NO RACE VARIABLE): 47 ML/MIN/1.73 M^2
GLUCOSE SERPL-MCNC: 126 MG/DL (ref 70–110)
HCT VFR BLD AUTO: 37.3 % (ref 37–48.5)
HGB BLD-MCNC: 12.3 G/DL (ref 12–16)
IMM GRANULOCYTES # BLD AUTO: 0.03 K/UL (ref 0–0.04)
IMM GRANULOCYTES NFR BLD AUTO: 0.3 % (ref 0–0.5)
LYMPHOCYTES # BLD AUTO: 1.1 K/UL (ref 1–4.8)
LYMPHOCYTES NFR BLD: 12.5 % (ref 18–48)
MCH RBC QN AUTO: 29.7 PG (ref 27–31)
MCHC RBC AUTO-ENTMCNC: 33 G/DL (ref 32–36)
MCV RBC AUTO: 90 FL (ref 82–98)
MONOCYTES # BLD AUTO: 0.5 K/UL (ref 0.3–1)
MONOCYTES NFR BLD: 6 % (ref 4–15)
NEUTROPHILS # BLD AUTO: 6.9 K/UL (ref 1.8–7.7)
NEUTROPHILS NFR BLD: 80 % (ref 38–73)
NRBC BLD-RTO: 0 /100 WBC
PLATELET # BLD AUTO: 225 K/UL (ref 150–450)
PMV BLD AUTO: 10 FL (ref 9.2–12.9)
POCT GLUCOSE: 115 MG/DL (ref 70–110)
POTASSIUM SERPL-SCNC: 4.5 MMOL/L (ref 3.5–5.1)
PROT SERPL-MCNC: 7.6 G/DL (ref 6–8.4)
RBC # BLD AUTO: 4.14 M/UL (ref 4–5.4)
SODIUM SERPL-SCNC: 137 MMOL/L (ref 136–145)
WBC # BLD AUTO: 8.61 K/UL (ref 3.9–12.7)

## 2023-12-12 PROCEDURE — 99285 EMERGENCY DEPT VISIT HI MDM: CPT | Mod: 25

## 2023-12-12 PROCEDURE — 96374 THER/PROPH/DIAG INJ IV PUSH: CPT

## 2023-12-12 PROCEDURE — 11000001 HC ACUTE MED/SURG PRIVATE ROOM

## 2023-12-12 PROCEDURE — 85025 COMPLETE CBC W/AUTO DIFF WBC: CPT | Performed by: EMERGENCY MEDICINE

## 2023-12-12 PROCEDURE — 36415 COLL VENOUS BLD VENIPUNCTURE: CPT | Performed by: EMERGENCY MEDICINE

## 2023-12-12 PROCEDURE — 25000003 PHARM REV CODE 250: Performed by: EMERGENCY MEDICINE

## 2023-12-12 PROCEDURE — 96375 TX/PRO/DX INJ NEW DRUG ADDON: CPT

## 2023-12-12 PROCEDURE — 63600175 PHARM REV CODE 636 W HCPCS: Performed by: HOSPITALIST

## 2023-12-12 PROCEDURE — 51702 INSERT TEMP BLADDER CATH: CPT

## 2023-12-12 PROCEDURE — 82962 GLUCOSE BLOOD TEST: CPT

## 2023-12-12 PROCEDURE — 63600175 PHARM REV CODE 636 W HCPCS: Performed by: EMERGENCY MEDICINE

## 2023-12-12 PROCEDURE — 80053 COMPREHEN METABOLIC PANEL: CPT | Performed by: EMERGENCY MEDICINE

## 2023-12-12 PROCEDURE — 96376 TX/PRO/DX INJ SAME DRUG ADON: CPT

## 2023-12-12 RX ORDER — MORPHINE SULFATE 4 MG/ML
4 INJECTION, SOLUTION INTRAMUSCULAR; INTRAVENOUS
Status: COMPLETED | OUTPATIENT
Start: 2023-12-12 | End: 2023-12-12

## 2023-12-12 RX ORDER — ACETAMINOPHEN 500 MG
1000 TABLET ORAL
Status: COMPLETED | OUTPATIENT
Start: 2023-12-12 | End: 2023-12-12

## 2023-12-12 RX ORDER — MUPIROCIN 20 MG/G
OINTMENT TOPICAL 2 TIMES DAILY
Status: DISCONTINUED | OUTPATIENT
Start: 2023-12-13 | End: 2023-12-13

## 2023-12-12 RX ORDER — NALOXONE HCL 0.4 MG/ML
0.02 VIAL (ML) INJECTION
Status: DISCONTINUED | OUTPATIENT
Start: 2023-12-12 | End: 2023-12-18 | Stop reason: HOSPADM

## 2023-12-12 RX ORDER — ONDANSETRON 2 MG/ML
4 INJECTION INTRAMUSCULAR; INTRAVENOUS EVERY 4 HOURS PRN
Status: DISCONTINUED | OUTPATIENT
Start: 2023-12-12 | End: 2023-12-18 | Stop reason: HOSPADM

## 2023-12-12 RX ORDER — ENOXAPARIN SODIUM 100 MG/ML
40 INJECTION SUBCUTANEOUS ONCE
Status: DISCONTINUED | OUTPATIENT
Start: 2023-12-12 | End: 2023-12-18 | Stop reason: HOSPADM

## 2023-12-12 RX ORDER — SODIUM CHLORIDE 0.9 % (FLUSH) 0.9 %
10 SYRINGE (ML) INJECTION EVERY 12 HOURS PRN
Status: DISCONTINUED | OUTPATIENT
Start: 2023-12-12 | End: 2023-12-18 | Stop reason: HOSPADM

## 2023-12-12 RX ORDER — LOSARTAN POTASSIUM 50 MG/1
50 TABLET ORAL DAILY
Status: DISCONTINUED | OUTPATIENT
Start: 2023-12-13 | End: 2023-12-16

## 2023-12-12 RX ORDER — MORPHINE SULFATE 2 MG/ML
6 INJECTION, SOLUTION INTRAMUSCULAR; INTRAVENOUS EVERY 4 HOURS PRN
Status: DISCONTINUED | OUTPATIENT
Start: 2023-12-12 | End: 2023-12-18 | Stop reason: HOSPADM

## 2023-12-12 RX ORDER — INSULIN ASPART 100 [IU]/ML
0-5 INJECTION, SOLUTION INTRAVENOUS; SUBCUTANEOUS
Status: DISCONTINUED | OUTPATIENT
Start: 2023-12-12 | End: 2023-12-18 | Stop reason: HOSPADM

## 2023-12-12 RX ORDER — IBUPROFEN 200 MG
16 TABLET ORAL
Status: DISCONTINUED | OUTPATIENT
Start: 2023-12-12 | End: 2023-12-18 | Stop reason: HOSPADM

## 2023-12-12 RX ORDER — IBUPROFEN 200 MG
24 TABLET ORAL
Status: DISCONTINUED | OUTPATIENT
Start: 2023-12-12 | End: 2023-12-18 | Stop reason: HOSPADM

## 2023-12-12 RX ORDER — GLUCAGON 1 MG
1 KIT INJECTION
Status: DISCONTINUED | OUTPATIENT
Start: 2023-12-12 | End: 2023-12-18 | Stop reason: HOSPADM

## 2023-12-12 RX ADMIN — ONDANSETRON 4 MG: 2 INJECTION INTRAMUSCULAR; INTRAVENOUS at 06:12

## 2023-12-12 RX ADMIN — ACETAMINOPHEN 1000 MG: 500 TABLET ORAL at 03:12

## 2023-12-12 RX ADMIN — MORPHINE SULFATE 6 MG: 2 INJECTION, SOLUTION INTRAMUSCULAR; INTRAVENOUS at 06:12

## 2023-12-12 RX ADMIN — MORPHINE SULFATE 4 MG: 4 INJECTION, SOLUTION INTRAMUSCULAR; INTRAVENOUS at 03:12

## 2023-12-12 NOTE — ED NOTES
Patient assisted with rolling to her right side. Pillow under her back for comfort. She denies any needs or questions at this time.

## 2023-12-12 NOTE — ED NOTES
Verbal order for urinary catheter secondary to immobilization. Procedure has been explained to patient. She tolerated well.

## 2023-12-12 NOTE — ED NOTES
Patient has been updated on plan of care and lab results. No needs or questions at this time.   Patient has been updated on admission.

## 2023-12-12 NOTE — ED NOTES
May Solomon presents to the ED with c/o left hip pain that occurred after a fall over a hitch. Patient reports landing on her left hip. She is unable to bear weight to her left leg. She has a scabbed abrasion to right knee that is not from this fall and a lidocaine patch to right lower lateral calf. Patient denies hitting her head and denies any blood thinners. AAOx4. Mucous membranes are pink and moist. Skin is warm, dry and intact.  SILVIA VSS  Verified patient's name and date of birth.

## 2023-12-13 ENCOUNTER — ANESTHESIA (OUTPATIENT)
Dept: SURGERY | Facility: HOSPITAL | Age: 73
DRG: 522 | End: 2023-12-13
Payer: MEDICARE

## 2023-12-13 ENCOUNTER — ANESTHESIA EVENT (OUTPATIENT)
Dept: SURGERY | Facility: HOSPITAL | Age: 73
DRG: 522 | End: 2023-12-13
Payer: MEDICARE

## 2023-12-13 LAB
ABO + RH BLD: NORMAL
BASOPHILS # BLD AUTO: 0.04 K/UL (ref 0–0.2)
BASOPHILS NFR BLD: 0.4 % (ref 0–1.9)
BLD GP AB SCN CELLS X3 SERPL QL: NORMAL
DIFFERENTIAL METHOD BLD: ABNORMAL
EOSINOPHIL # BLD AUTO: 0.1 K/UL (ref 0–0.5)
EOSINOPHIL NFR BLD: 1.3 % (ref 0–8)
ERYTHROCYTE [DISTWIDTH] IN BLOOD BY AUTOMATED COUNT: 13.4 % (ref 11.5–14.5)
GLUCOSE SERPL-MCNC: 110 MG/DL (ref 70–110)
GLUCOSE SERPL-MCNC: 150 MG/DL (ref 70–110)
GLUCOSE SERPL-MCNC: 202 MG/DL (ref 70–110)
HCT VFR BLD AUTO: 34.8 % (ref 37–48.5)
HGB BLD-MCNC: 11.3 G/DL (ref 12–16)
IMM GRANULOCYTES # BLD AUTO: 0.03 K/UL (ref 0–0.04)
IMM GRANULOCYTES NFR BLD AUTO: 0.3 % (ref 0–0.5)
LYMPHOCYTES # BLD AUTO: 1.9 K/UL (ref 1–4.8)
LYMPHOCYTES NFR BLD: 21.4 % (ref 18–48)
MCH RBC QN AUTO: 29.3 PG (ref 27–31)
MCHC RBC AUTO-ENTMCNC: 32.5 G/DL (ref 32–36)
MCV RBC AUTO: 90 FL (ref 82–98)
MONOCYTES # BLD AUTO: 0.8 K/UL (ref 0.3–1)
MONOCYTES NFR BLD: 9.1 % (ref 4–15)
NEUTROPHILS # BLD AUTO: 6 K/UL (ref 1.8–7.7)
NEUTROPHILS NFR BLD: 67.5 % (ref 38–73)
NRBC BLD-RTO: 0 /100 WBC
PLATELET # BLD AUTO: 234 K/UL (ref 150–450)
PMV BLD AUTO: 10.4 FL (ref 9.2–12.9)
RBC # BLD AUTO: 3.86 M/UL (ref 4–5.4)
WBC # BLD AUTO: 8.91 K/UL (ref 3.9–12.7)

## 2023-12-13 PROCEDURE — 63600175 PHARM REV CODE 636 W HCPCS: Performed by: HOSPITALIST

## 2023-12-13 PROCEDURE — 27201423 OPTIME MED/SURG SUP & DEVICES STERILE SUPPLY: Performed by: ORTHOPAEDIC SURGERY

## 2023-12-13 PROCEDURE — D9220A PRA ANESTHESIA: Mod: ANES,,, | Performed by: STUDENT IN AN ORGANIZED HEALTH CARE EDUCATION/TRAINING PROGRAM

## 2023-12-13 PROCEDURE — 63600175 PHARM REV CODE 636 W HCPCS: Performed by: ORTHOPAEDIC SURGERY

## 2023-12-13 PROCEDURE — 94761 N-INVAS EAR/PLS OXIMETRY MLT: CPT

## 2023-12-13 PROCEDURE — 27000080 OPTIME MED/SURG SUP & DEVICES GENERAL CLASSIFICATION: Performed by: ORTHOPAEDIC SURGERY

## 2023-12-13 PROCEDURE — 63600175 PHARM REV CODE 636 W HCPCS: Performed by: NURSE ANESTHETIST, CERTIFIED REGISTERED

## 2023-12-13 PROCEDURE — 36000710: Performed by: ORTHOPAEDIC SURGERY

## 2023-12-13 PROCEDURE — 0SRB0JA REPLACEMENT OF LEFT HIP JOINT WITH SYNTHETIC SUBSTITUTE, UNCEMENTED, OPEN APPROACH: ICD-10-PCS | Performed by: ORTHOPAEDIC SURGERY

## 2023-12-13 PROCEDURE — 25000003 PHARM REV CODE 250: Performed by: NURSE ANESTHETIST, CERTIFIED REGISTERED

## 2023-12-13 PROCEDURE — 36415 COLL VENOUS BLD VENIPUNCTURE: CPT | Performed by: INTERNAL MEDICINE

## 2023-12-13 PROCEDURE — 37000008 HC ANESTHESIA 1ST 15 MINUTES: Performed by: ORTHOPAEDIC SURGERY

## 2023-12-13 PROCEDURE — 85025 COMPLETE CBC W/AUTO DIFF WBC: CPT | Performed by: HOSPITALIST

## 2023-12-13 PROCEDURE — 12000002 HC ACUTE/MED SURGE SEMI-PRIVATE ROOM

## 2023-12-13 PROCEDURE — 71000039 HC RECOVERY, EACH ADD'L HOUR: Performed by: ORTHOPAEDIC SURGERY

## 2023-12-13 PROCEDURE — C1776 JOINT DEVICE (IMPLANTABLE): HCPCS | Performed by: ORTHOPAEDIC SURGERY

## 2023-12-13 PROCEDURE — 36415 COLL VENOUS BLD VENIPUNCTURE: CPT | Performed by: HOSPITALIST

## 2023-12-13 PROCEDURE — 36000711: Performed by: ORTHOPAEDIC SURGERY

## 2023-12-13 PROCEDURE — 86901 BLOOD TYPING SEROLOGIC RH(D): CPT | Performed by: STUDENT IN AN ORGANIZED HEALTH CARE EDUCATION/TRAINING PROGRAM

## 2023-12-13 PROCEDURE — D9220A PRA ANESTHESIA: ICD-10-PCS | Mod: CRNA,,, | Performed by: NURSE ANESTHETIST, CERTIFIED REGISTERED

## 2023-12-13 PROCEDURE — S5010 5% DEXTROSE AND 0.45% SALINE: HCPCS | Performed by: ORTHOPAEDIC SURGERY

## 2023-12-13 PROCEDURE — 25000003 PHARM REV CODE 250: Performed by: STUDENT IN AN ORGANIZED HEALTH CARE EDUCATION/TRAINING PROGRAM

## 2023-12-13 PROCEDURE — 37000009 HC ANESTHESIA EA ADD 15 MINS: Performed by: ORTHOPAEDIC SURGERY

## 2023-12-13 PROCEDURE — 71000033 HC RECOVERY, INTIAL HOUR: Performed by: ORTHOPAEDIC SURGERY

## 2023-12-13 PROCEDURE — 27030 ARTHROTOMY HIP W/DRAINAGE: CPT | Mod: LT,,, | Performed by: ORTHOPAEDIC SURGERY

## 2023-12-13 PROCEDURE — D9220A PRA ANESTHESIA: ICD-10-PCS | Mod: ANES,,, | Performed by: STUDENT IN AN ORGANIZED HEALTH CARE EDUCATION/TRAINING PROGRAM

## 2023-12-13 PROCEDURE — D9220A PRA ANESTHESIA: Mod: CRNA,,, | Performed by: NURSE ANESTHETIST, CERTIFIED REGISTERED

## 2023-12-13 PROCEDURE — 25000003 PHARM REV CODE 250: Performed by: ORTHOPAEDIC SURGERY

## 2023-12-13 PROCEDURE — 82962 GLUCOSE BLOOD TEST: CPT | Performed by: ORTHOPAEDIC SURGERY

## 2023-12-13 DEVICE — IMPLANTABLE DEVICE: Type: IMPLANTABLE DEVICE | Site: HIP | Status: FUNCTIONAL

## 2023-12-13 RX ORDER — HYDROMORPHONE HYDROCHLORIDE 1 MG/ML
0.2 INJECTION, SOLUTION INTRAMUSCULAR; INTRAVENOUS; SUBCUTANEOUS EVERY 5 MIN PRN
Status: DISCONTINUED | OUTPATIENT
Start: 2023-12-13 | End: 2023-12-13 | Stop reason: HOSPADM

## 2023-12-13 RX ORDER — ONDANSETRON HYDROCHLORIDE 2 MG/ML
INJECTION, SOLUTION INTRAMUSCULAR; INTRAVENOUS
Status: DISCONTINUED | OUTPATIENT
Start: 2023-12-13 | End: 2023-12-13

## 2023-12-13 RX ORDER — HYDROCODONE BITARTRATE AND ACETAMINOPHEN 5; 325 MG/1; MG/1
1 TABLET ORAL EVERY 4 HOURS PRN
Status: DISCONTINUED | OUTPATIENT
Start: 2023-12-13 | End: 2023-12-18 | Stop reason: HOSPADM

## 2023-12-13 RX ORDER — FACIAL-BODY WIPES
10 EACH TOPICAL DAILY
Status: COMPLETED | OUTPATIENT
Start: 2023-12-13 | End: 2023-12-15

## 2023-12-13 RX ORDER — SODIUM CHLORIDE, SODIUM LACTATE, POTASSIUM CHLORIDE, CALCIUM CHLORIDE 600; 310; 30; 20 MG/100ML; MG/100ML; MG/100ML; MG/100ML
INJECTION, SOLUTION INTRAVENOUS CONTINUOUS PRN
Status: DISCONTINUED | OUTPATIENT
Start: 2023-12-13 | End: 2023-12-13

## 2023-12-13 RX ORDER — FENTANYL CITRATE 50 UG/ML
INJECTION, SOLUTION INTRAMUSCULAR; INTRAVENOUS
Status: DISCONTINUED | OUTPATIENT
Start: 2023-12-13 | End: 2023-12-13

## 2023-12-13 RX ORDER — CEFAZOLIN SODIUM 1 G/50ML
1 SOLUTION INTRAVENOUS
Status: COMPLETED | OUTPATIENT
Start: 2023-12-13 | End: 2023-12-14

## 2023-12-13 RX ORDER — LIDOCAINE HYDROCHLORIDE 10 MG/ML
INJECTION, SOLUTION EPIDURAL; INFILTRATION; INTRACAUDAL; PERINEURAL
Status: DISCONTINUED | OUTPATIENT
Start: 2023-12-13 | End: 2023-12-13

## 2023-12-13 RX ORDER — PHENYLEPHRINE HYDROCHLORIDE 10 MG/ML
INJECTION INTRAVENOUS
Status: DISCONTINUED | OUTPATIENT
Start: 2023-12-13 | End: 2023-12-13

## 2023-12-13 RX ORDER — DEXAMETHASONE SODIUM PHOSPHATE 4 MG/ML
INJECTION, SOLUTION INTRA-ARTICULAR; INTRALESIONAL; INTRAMUSCULAR; INTRAVENOUS; SOFT TISSUE
Status: DISCONTINUED | OUTPATIENT
Start: 2023-12-13 | End: 2023-12-13

## 2023-12-13 RX ORDER — CEFAZOLIN SODIUM 1 G/3ML
INJECTION, POWDER, FOR SOLUTION INTRAMUSCULAR; INTRAVENOUS
Status: DISCONTINUED | OUTPATIENT
Start: 2023-12-13 | End: 2023-12-13

## 2023-12-13 RX ORDER — FAMOTIDINE 10 MG/ML
INJECTION INTRAVENOUS
Status: DISCONTINUED | OUTPATIENT
Start: 2023-12-13 | End: 2023-12-13

## 2023-12-13 RX ORDER — DEXTROSE MONOHYDRATE AND SODIUM CHLORIDE 5; .45 G/100ML; G/100ML
INJECTION, SOLUTION INTRAVENOUS CONTINUOUS
Status: DISCONTINUED | OUTPATIENT
Start: 2023-12-13 | End: 2023-12-14

## 2023-12-13 RX ORDER — MUPIROCIN 20 MG/G
1 OINTMENT TOPICAL 2 TIMES DAILY
Status: COMPLETED | OUTPATIENT
Start: 2023-12-13 | End: 2023-12-17

## 2023-12-13 RX ORDER — DIPHENHYDRAMINE HYDROCHLORIDE 50 MG/ML
12.5 INJECTION, SOLUTION INTRAMUSCULAR; INTRAVENOUS
Status: DISCONTINUED | OUTPATIENT
Start: 2023-12-13 | End: 2023-12-13 | Stop reason: HOSPADM

## 2023-12-13 RX ORDER — OXYCODONE HYDROCHLORIDE 5 MG/1
5 TABLET ORAL
Status: DISCONTINUED | OUTPATIENT
Start: 2023-12-13 | End: 2023-12-13 | Stop reason: HOSPADM

## 2023-12-13 RX ORDER — ONDANSETRON 2 MG/ML
4 INJECTION INTRAMUSCULAR; INTRAVENOUS DAILY PRN
Status: DISCONTINUED | OUTPATIENT
Start: 2023-12-13 | End: 2023-12-13 | Stop reason: HOSPADM

## 2023-12-13 RX ORDER — ROCURONIUM BROMIDE 10 MG/ML
INJECTION, SOLUTION INTRAVENOUS
Status: DISCONTINUED | OUTPATIENT
Start: 2023-12-13 | End: 2023-12-13

## 2023-12-13 RX ORDER — GUAIFENESIN 100 MG/5ML
81 LIQUID (ML) ORAL 2 TIMES DAILY
Status: DISCONTINUED | OUTPATIENT
Start: 2023-12-13 | End: 2023-12-18 | Stop reason: HOSPADM

## 2023-12-13 RX ORDER — SODIUM CHLORIDE 0.9 % (FLUSH) 0.9 %
5 SYRINGE (ML) INJECTION
Status: DISCONTINUED | OUTPATIENT
Start: 2023-12-13 | End: 2023-12-18 | Stop reason: HOSPADM

## 2023-12-13 RX ORDER — ACETAMINOPHEN 10 MG/ML
INJECTION, SOLUTION INTRAVENOUS
Status: DISCONTINUED | OUTPATIENT
Start: 2023-12-13 | End: 2023-12-13

## 2023-12-13 RX ORDER — ZOLPIDEM TARTRATE 5 MG/1
5 TABLET ORAL NIGHTLY PRN
Status: DISCONTINUED | OUTPATIENT
Start: 2023-12-13 | End: 2023-12-18 | Stop reason: HOSPADM

## 2023-12-13 RX ORDER — DEXMEDETOMIDINE HYDROCHLORIDE 100 UG/ML
INJECTION, SOLUTION INTRAVENOUS
Status: DISCONTINUED | OUTPATIENT
Start: 2023-12-13 | End: 2023-12-13

## 2023-12-13 RX ORDER — SUCCINYLCHOLINE CHLORIDE 20 MG/ML
INJECTION INTRAMUSCULAR; INTRAVENOUS
Status: DISCONTINUED | OUTPATIENT
Start: 2023-12-13 | End: 2023-12-13

## 2023-12-13 RX ORDER — PROPOFOL 10 MG/ML
VIAL (ML) INTRAVENOUS
Status: DISCONTINUED | OUTPATIENT
Start: 2023-12-13 | End: 2023-12-13

## 2023-12-13 RX ADMIN — FENTANYL CITRATE 50 MCG: 50 INJECTION, SOLUTION INTRAMUSCULAR; INTRAVENOUS at 09:12

## 2023-12-13 RX ADMIN — PHENYLEPHRINE HYDROCHLORIDE 200 MCG: 10 INJECTION INTRAVENOUS at 10:12

## 2023-12-13 RX ADMIN — DEXAMETHASONE SODIUM PHOSPHATE 4 MG: 4 INJECTION, SOLUTION INTRAMUSCULAR; INTRAVENOUS at 09:12

## 2023-12-13 RX ADMIN — ROCURONIUM BROMIDE 35 MG: 10 INJECTION, SOLUTION INTRAVENOUS at 09:12

## 2023-12-13 RX ADMIN — INSULIN ASPART 1 UNITS: 100 INJECTION, SOLUTION INTRAVENOUS; SUBCUTANEOUS at 08:12

## 2023-12-13 RX ADMIN — CEFAZOLIN SODIUM 1 G: 1 SOLUTION INTRAVENOUS at 05:12

## 2023-12-13 RX ADMIN — ACETAMINOPHEN 1000 MG: 10 INJECTION, SOLUTION INTRAVENOUS at 09:12

## 2023-12-13 RX ADMIN — SUGAMMADEX 200 MG: 100 INJECTION, SOLUTION INTRAVENOUS at 10:12

## 2023-12-13 RX ADMIN — SODIUM CHLORIDE, SODIUM LACTATE, POTASSIUM CHLORIDE, AND CALCIUM CHLORIDE: .6; .31; .03; .02 INJECTION, SOLUTION INTRAVENOUS at 09:12

## 2023-12-13 RX ADMIN — Medication 120 MG: at 09:12

## 2023-12-13 RX ADMIN — LIDOCAINE HYDROCHLORIDE 50 MG: 10 INJECTION, SOLUTION EPIDURAL; INFILTRATION; INTRACAUDAL; PERINEURAL at 09:12

## 2023-12-13 RX ADMIN — BISACODYL 10 MG: 10 SUPPOSITORY RECTAL at 02:12

## 2023-12-13 RX ADMIN — SODIUM CHLORIDE, SODIUM LACTATE, POTASSIUM CHLORIDE, AND CALCIUM CHLORIDE: .6; .31; .03; .02 INJECTION, SOLUTION INTRAVENOUS at 11:12

## 2023-12-13 RX ADMIN — ONDANSETRON 4 MG: 2 INJECTION INTRAMUSCULAR; INTRAVENOUS at 09:12

## 2023-12-13 RX ADMIN — OXYCODONE HYDROCHLORIDE 5 MG: 5 TABLET ORAL at 11:12

## 2023-12-13 RX ADMIN — GLYCOPYRROLATE 0.2 MG: 0.2 INJECTION, SOLUTION INTRAMUSCULAR; INTRAVITREAL at 10:12

## 2023-12-13 RX ADMIN — MUPIROCIN 1 G: 20 OINTMENT TOPICAL at 02:12

## 2023-12-13 RX ADMIN — MORPHINE SULFATE 6 MG: 2 INJECTION, SOLUTION INTRAMUSCULAR; INTRAVENOUS at 08:12

## 2023-12-13 RX ADMIN — ASPIRIN 81 MG 81 MG: 81 TABLET ORAL at 02:12

## 2023-12-13 RX ADMIN — DEXTROSE AND SODIUM CHLORIDE: 5; .45 INJECTION, SOLUTION INTRAVENOUS at 02:12

## 2023-12-13 RX ADMIN — CEFAZOLIN 2 G: 330 INJECTION, POWDER, FOR SOLUTION INTRAMUSCULAR; INTRAVENOUS at 10:12

## 2023-12-13 RX ADMIN — MUPIROCIN 1 G: 20 OINTMENT TOPICAL at 08:12

## 2023-12-13 RX ADMIN — FAMOTIDINE 20 MG: 10 INJECTION, SOLUTION INTRAVENOUS at 09:12

## 2023-12-13 RX ADMIN — ROCURONIUM BROMIDE 5 MG: 10 INJECTION, SOLUTION INTRAVENOUS at 09:12

## 2023-12-13 RX ADMIN — FENTANYL CITRATE 50 MCG: 50 INJECTION, SOLUTION INTRAMUSCULAR; INTRAVENOUS at 10:12

## 2023-12-13 RX ADMIN — LOSARTAN POTASSIUM 50 MG: 50 TABLET, FILM COATED ORAL at 02:12

## 2023-12-13 RX ADMIN — DEXMEDETOMIDINE HYDROCHLORIDE 10 MCG: 100 INJECTION, SOLUTION INTRAVENOUS at 10:12

## 2023-12-13 RX ADMIN — HYDROCODONE BITARTRATE AND ACETAMINOPHEN 1 TABLET: 5; 325 TABLET ORAL at 02:12

## 2023-12-13 RX ADMIN — ASPIRIN 81 MG 81 MG: 81 TABLET ORAL at 08:12

## 2023-12-13 RX ADMIN — PROPOFOL 120 MG: 10 INJECTION, EMULSION INTRAVENOUS at 09:12

## 2023-12-13 NOTE — ANESTHESIA PREPROCEDURE EVALUATION
12/13/2023  May Solomon is a 73 y.o., female.      Patient Active Problem List   Diagnosis    HTN (hypertension)    History of colonic polyps    Diabetes mellitus, type 2    Hyperlipidemia    Atrophic vulva    Menopausal symptom    Obesity with body mass index 30 or greater    Osteopenia    Closed left hip fracture       Past Surgical History:   Procedure Laterality Date    ARTHROSCOPY OF KNEE Right 2013    BREAST BIOPSY      COLONOSCOPY  05/2017    EYE SURGERY Right 05/2021    CATARACT    KNEE ARTHROSCOPY W/ MENISCECTOMY Left 9/13/2023    Procedure: ARTHROSCOPY, KNEE, WITH MENISCECTOMY;  Surgeon: Sachin Tong MD;  Location: [facility] OR;  Service: Orthopedics;  Laterality: Left;    TRIGGER FINGER RELEASE Left 10/26/2022    Procedure: RELEASE, TRIGGER FINGER;  Surgeon: Sachin Tong MD;  Location: [facility] OR;  Service: Orthopedics;  Laterality: Left;        Tobacco Use:  The patient  reports that she has never smoked. She has never used smokeless tobacco.     Results for orders placed or performed during the hospital encounter of 09/06/23   EKG 12-lead    Collection Time: 09/06/23  1:35 PM    Narrative    Test Reason : M23.301,M23.204,    Vent. Rate : 071 BPM     Atrial Rate : 071 BPM     P-R Int : 168 ms          QRS Dur : 078 ms      QT Int : 360 ms       P-R-T Axes : 060 040 069 degrees     QTc Int : 391 ms    Normal sinus rhythm  Normal ECG  When compared with ECG of 24-OCT-2022 13:48,  No significant change was found  Confirmed by Alex Crouch MD ([id number]) on 9/6/2023 5:10:06 PM    Referred By:             Confirmed By:Alex Crouch MD        Imaging Results              X-Ray Pelvis Routine AP (Final result)  Result time 12/12/23 14:50:13      Final result by Abril Miranda MD (12/12/23 14:50:13)                   Impression:      Acute left femoral neck fracture.      Electronically signed  by: Abril Miranda  Date:    12/12/2023  Time:    14:50               Narrative:    EXAMINATION:  XR PELVIS ROUTINE AP    CLINICAL HISTORY:  left hip injury;    TECHNIQUE:  AP view of the pelvis was performed.    COMPARISON:  None.    FINDINGS:  An AP view of the pelvis was obtained.  The left hip is internally rotated, and there is an acute femoral neck fracture, better demonstrated on today's views of the femur.  It is mildly comminuted.  The right hip, sacroiliac joints, pubic symphysis are intact.                                       X-Ray Knee 2 View Left (Final result)  Result time 12/12/23 14:51:09      Final result by Abril Miranda MD (12/12/23 14:51:09)                   Impression:      Degenerative changes with no acute bony abnormality.      Electronically signed by: Abril Miranda  Date:    12/12/2023  Time:    14:51               Narrative:    EXAMINATION:  XR KNEE 1 OR 2 VIEW LEFT    CLINICAL HISTORY:  left hip injury;    TECHNIQUE:  One or two views of the left knee were performed.    COMPARISON:  06/29/2023    FINDINGS:  There are mild pancompartmental degenerative changes at the knee with no evidence of a fracture.  Trace knee joint effusion is questioned.                                       X-Ray Femur 2 View Left (Final result)  Result time 12/12/23 14:49:15      Final result by Abril Miranda MD (12/12/23 14:49:15)                   Impression:      Left femoral neck fracture.      Electronically signed by: Abril Miranda  Date:    12/12/2023  Time:    14:49               Narrative:    EXAMINATION:  XR FEMUR 2 VIEW LEFT    CLINICAL HISTORY:  left hip injury;    TECHNIQUE:  AP and lateral views of the left femur were performed.    COMPARISON:  Knee x-rays of 06/29/2023    FINDINGS:  There is an acute left femoral neck fracture.  The remainder of the femur is intact.  There are minor degenerative changes at the knee.                                       Lab  Results   Component Value Date    WBC 8.91 12/13/2023    HGB 11.3 (L) 12/13/2023    HCT 34.8 (L) 12/13/2023    MCV 90 12/13/2023     12/13/2023     BMP  Lab Results   Component Value Date     12/12/2023    K 4.5 12/12/2023     12/12/2023    CO2 26 12/12/2023    BUN 24 (H) 12/12/2023    CREATININE 1.2 12/12/2023    CALCIUM 10.4 12/12/2023    ANIONGAP 10 12/12/2023     (H) 12/12/2023    GLU 97 09/06/2023     (H) 04/17/2023       No results found for this or any previous visit.          Pre-op Assessment    I have reviewed the Patient Summary Reports.     I have reviewed the Nursing Notes. I have reviewed the NPO Status.   I have reviewed the Medications.     Review of Systems  Anesthesia Hx:  No problems with previous Anesthesia             Denies Family Hx of Anesthesia complications.    Denies Personal Hx of Anesthesia complications.                    Social:  Non-Smoker       Hematology/Oncology:    Oncology Normal    -- Anemia:                                  EENT/Dental:  EENT/Dental Normal           Cardiovascular:     Hypertension           hyperlipidemia   ECG has been reviewed.                          Pulmonary:  Pulmonary Normal                       Renal/:  Renal/ Normal                 Hepatic/GI:  Hepatic/GI Normal                 Musculoskeletal:  Musculoskeletal Normal                Neurological:  Neurology Normal                                      Endocrine:  Diabetes, type 2         Obesity / BMI > 30  Psych:  Psychiatric Normal                    Physical Exam  General: Well nourished, Cooperative, Alert and Oriented    Airway:  Mallampati: II   Mouth Opening: Normal  TM Distance: Normal  Tongue: Normal  Neck ROM: Normal ROM    Dental:  Intact    Chest/Lungs:  Clear to auscultation    Heart:  Rate: Normal  Rhythm: Regular Rhythm  Sounds: Normal        Anesthesia Plan  Type of Anesthesia, risks & benefits discussed:    Anesthesia Type: Gen ETT  Intra-op  Monitoring Plan: Standard ASA Monitors  Post Op Pain Control Plan: multimodal analgesia  Airway Plan: Video  Informed Consent: Informed consent signed with the Patient and all parties understand the risks and agree with anesthesia plan.  All questions answered.   ASA Score: 3  Anesthesia Plan Notes: GETA - N/V in preop  ly Robles    Ready For Surgery From Anesthesia Perspective.     .

## 2023-12-13 NOTE — ANESTHESIA PROCEDURE NOTES
Intubation    Date/Time: 12/13/2023 9:39 AM    Performed by: Shawnee Capellan CRNA  Authorized by: Shawnee Capellan CRNA    Intubation:     Induction:  Intravenous    Intubated:  Postinduction    Mask Ventilation:  Easy mask    Attempts:  1    Attempted By:  CRNA    Method of Intubation:  Video laryngoscopy    Blade:  Hill 3    Laryngeal View Grade: Grade I - full view of cords      Difficult Airway Encountered?: No      Complications:  None    Airway Device:  Oral endotracheal tube    Airway Device Size:  7.5    Style/Cuff Inflation:  Cuffed (inflated to minimal occlusive pressure)    Tube secured:  22    Secured at:  The lips    Placement Verified By:  Revisualization with laryngoscopy and Capnometry    Complicating Factors:  None    Findings Post-Intubation:  BS equal bilateral and atraumatic/condition of teeth unchanged

## 2023-12-13 NOTE — ED NOTES
Patient has assisted with being repositioned to her right side. No other needs or questions at this time.

## 2023-12-13 NOTE — ED NOTES
Upon transfer to [facility] room 1114 patient is AAOx4, no cardiac or respiratory complications. No needs or questions at this time.

## 2023-12-13 NOTE — OP NOTE
[facility]  Surgery Department  Operative Note    SUMMARY     Date of Procedure: 12/13/2023     Procedure:  Left total hip arthroplasty    Surgeon(s) and Role:     * Sachin Tong MD - Primary    Assisting Surgeon:  Destinee    Pre-Operative Diagnosis: Left hip pain [M25.552]    Post-Operative Diagnosis: Post-Op Diagnosis Codes:     * Left hip pain [M25.552]    Anesthesia: General    Intraoperative Findings:  Displaced comminuted proximal femur fracture (femoral neck fracture )    Description of the Findings of the Procedure:  Patient was placed on the operative table lateral decubitus position.  She was prepped and draped in the usual sterile manner for surgery.  A posterior lateral approach was undertaken to the hip and carried down sharply through the skin with a 10 blade.  All bleeding was meticulously controlled.  The deep fascia was incised in line with its fibers.  The short external rotators were identified.  The sciatic nerve was identified.  It was protected.  The Charnley retractors were placed.  The short external rotators were taken down and tagged for later reattachment.  The capsule was identified and split.  The fracture hematoma was irrigated free.  At this point the femoral head was removed.  Multiple small bone fragments were removed from the acetabulum.  We now used the cookie cutter and then the canal finer and then the lateralizer .  We reamed all the way up to a 6 that gave us good chatter.  At this point we used the broaches and we broached to a size 6 that gave us good fill.  We turned our attention to the acetabulum     Retractors were placed anteriorly and posteriorly.  We were careful not to damage any neurovascular structures.  I cleaned the fovea out with a Bovie.  We now used the 47 mm Reamer and reamed just to the foveal floor.  At this point we expanded until we got good bleeding bone.  A 51 mm Reamer was the last Reamer.  I now tapped a 52 mm cup into position and we  had an excellent Press-Fit.  The liner was tapped into position.  I pulsed and irrigated and turned my attention back to the femur.    The femoral broach was dropped.  I placed a +5 femoral head into position.  The construct trialed beautifully with excellent stability and perfect leg lengths.  I removed the trial components and pulsed and irrigated.  I tapped the actual components into position the construct trialed just beautifully.      At this point we copiously irrigated.  The sciatic nerve was noted to be in good position and condition.  I closed the short external rotators with 2. FiberWire.  I closed the deep fascia with 2. FiberWire in a running Quill stitch.  I irrigated and closed the subcu with 2-0 Vicryl and the skin with 4-0 Monocryl.  Sterile dressings were applied.  The patient was noted to be in stable condition pending an x-ray and neurovascular check.    Complications: No    Estimated Blood Loss (EBL): * No values recorded between 12/13/2023 10:13 AM and 12/13/2023 11:03 AM *           Implants:   Implant Name Type Inv. Item Serial No.  Lot No. LRB No. Used Action   Acetabular Shell Sector    [employer] [id number] Left 1 Implanted   Acetabular Liner    [employer] [id number] Left 1 Implanted   Femoral Head    [email or web address] [id number] Left 1 Implanted   Femoral Stem    [email or web address] [id number] Left 1 Implanted       Specimens:   Specimen (24h ago, onward)      None                    Condition: Good    Disposition: PACU - hemodynamically stable.

## 2023-12-13 NOTE — PLAN OF CARE
Patient taken to room via bed at this time. Awakens easily, but mostly sleeping. DICK Quarles at bedside to assume care of patient.

## 2023-12-13 NOTE — ED NOTES
Patient has been updated that transport has been set up. She denies any needs or questions at this time.

## 2023-12-13 NOTE — ASSESSMENT & PLAN NOTE
Patient's FSGs will be monitored.  Last A1c reviewed-   Lab Results   Component Value Date    HGBA1C 6.3 (H) 04/17/2023     Most recent fingerstick glucose reviewed-   Recent Labs   Lab 12/12/23 1814   POCTGLUCOSE 115*     Will use low dose sliding scale.  Do not resume any oral DM meds while pt admitted.

## 2023-12-13 NOTE — ASSESSMENT & PLAN NOTE
Bedrest.  Admit to med surg.  Consult orthopedic surgery.  Use morphine for analgesia.  Likely to have surgery tomorrow.

## 2023-12-13 NOTE — CARE UPDATE
Day 0 note.    I have read the chart and aware of patient's condition.  Patient had left total hip arthroplasty on 12/13/2023.  I evaluated patient after procedure.  Pain is well controlled.  Son is at bedside.  Continue current care.

## 2023-12-13 NOTE — SUBJECTIVE & OBJECTIVE
Past Medical History:   Diagnosis Date    Basal cell carcinoma     Diabetes mellitus, type 2     History of colonic polyps     HTN (hypertension)     Hyperlipidemia        Past Surgical History:   Procedure Laterality Date    ARTHROSCOPY OF KNEE Right 2013    BREAST BIOPSY      COLONOSCOPY  05/2017    EYE SURGERY Right 05/2021    CATARACT    KNEE ARTHROSCOPY W/ MENISCECTOMY Left 9/13/2023    Procedure: ARTHROSCOPY, KNEE, WITH MENISCECTOMY;  Surgeon: Sachin Tong MD;  Location: [facility] OR;  Service: Orthopedics;  Laterality: Left;    TRIGGER FINGER RELEASE Left 10/26/2022    Procedure: RELEASE, TRIGGER FINGER;  Surgeon: Sachin Tong MD;  Location: [facility] OR;  Service: Orthopedics;  Laterality: Left;       Review of patient's allergies indicates:   Allergen Reactions    Sulfa (sulfonamide antibiotics) Rash       No current facility-administered medications on file prior to encounter.     Current Outpatient Medications on File Prior to Encounter   Medication Sig    calcium carbonate (OS-SANDEEP) 500 mg calcium (1,250 mg) chewable tablet Take 1 tablet by mouth once daily.    losartan (COZAAR) 50 MG tablet Take 1 tablet (50 mg total) by mouth once daily.    metFORMIN (GLUCOPHAGE) 500 MG tablet Take 1 tablet (500 mg total) by mouth 2 (two) times daily with meals.    rosuvastatin (CRESTOR) 10 MG tablet Take 1 tablet (10 mg total) by mouth once daily.    multivitamin (THERAGRAN) per tablet Take 1 tablet by mouth once daily.     Family History       Problem Relation (Age of Onset)    Colon cancer Mother (76)    Hypertension Mother    Obesity Mother    Prostate cancer Father          Tobacco Use    Smoking status: Never    Smokeless tobacco: Never   Substance and Sexual Activity    Alcohol use: Yes     Comment: occasional    Drug use: Never    Sexual activity: Not Currently     Review of Systems   Constitutional:  Negative for activity change, appetite change, chills and fever.   HENT:  Negative for congestion, ear pain and  nosebleeds.    Eyes:  Negative for itching and visual disturbance.   Respiratory:  Negative for apnea, cough, chest tightness, shortness of breath and wheezing.    Cardiovascular:  Negative for chest pain, palpitations and leg swelling.   Gastrointestinal:  Negative for abdominal distention, abdominal pain, constipation, diarrhea, nausea and vomiting.   Genitourinary:  Negative for dysuria and flank pain.   Musculoskeletal:  Negative for back pain.        Hip pain   Neurological:  Negative for dizziness and headaches.     Objective:     Vital Signs (Most Recent):  Temp: 98.2 °F (36.8 °C) (12/13/23 0009)  Pulse: 89 (12/13/23 0009)  Resp: 18 (12/13/23 0009)  BP: (!) 162/89 (12/13/23 0009)  SpO2: 96 % (12/13/23 0009) Vital Signs (24h Range):  Temp:  [98 °F (36.7 °C)-98.5 °F (36.9 °C)] 98.2 °F (36.8 °C)  Pulse:  [70-95] 89  Resp:  [16-20] 18  SpO2:  [94 %-98 %] 96 %  BP: (103-178)/(51-93) 162/89     Weight: 77.1 kg (170 lb)  Body mass index is 30.11 kg/m².     Physical Exam  Vitals reviewed.   Constitutional:       General: She is not in acute distress.     Appearance: Normal appearance. She is not ill-appearing, toxic-appearing or diaphoretic.   HENT:      Head: Normocephalic and atraumatic.      Mouth/Throat:      Mouth: Mucous membranes are moist.      Pharynx: Oropharynx is clear.   Eyes:      General: No scleral icterus.     Conjunctiva/sclera: Conjunctivae normal.   Neck:      Vascular: No carotid bruit.   Cardiovascular:      Rate and Rhythm: Normal rate and regular rhythm.      Pulses: Normal pulses.      Heart sounds: Normal heart sounds.   Pulmonary:      Effort: Pulmonary effort is normal.      Breath sounds: Normal breath sounds.   Abdominal:      General: Abdomen is flat. Bowel sounds are normal.      Palpations: Abdomen is soft.   Musculoskeletal:         General: Tenderness present. Normal range of motion.   Skin:     General: Skin is warm.   Neurological:      General: No focal deficit present.       "Mental Status: She is alert and oriented to person, place, and time. Mental status is at baseline.   Psychiatric:         Mood and Affect: Mood normal.         Behavior: Behavior normal.                Significant Labs: All pertinent labs within the past 24 hours have been reviewed.  BMP:   Recent Labs   Lab 12/12/23  1446   *      K 4.5      CO2 26   BUN 24*   CREATININE 1.2   CALCIUM 10.4     CBC:   Recent Labs   Lab 12/12/23  1446   WBC 8.61   HGB 12.3   HCT 37.3        CMP:   Recent Labs   Lab 12/12/23  1446      K 4.5      CO2 26   *   BUN 24*   CREATININE 1.2   CALCIUM 10.4   PROT 7.6   ALBUMIN 4.5   BILITOT 1.1*   ALKPHOS 57   AST 32   ALT 25   ANIONGAP 10     Magnesium: No results for input(s): "MG" in the last 48 hours.    Significant Imaging: I have reviewed all pertinent imaging results/findings within the past 24 hours.  "

## 2023-12-13 NOTE — H&P
[facility] Medicine  History & Physical    Patient Name: May Solomon  MRN: [id number]  Patient Class: IP- Inpatient  Admission Date: 12/12/2023  Attending Physician: Olivia Valencia MD  Primary Care Provider: Irineo Freeman Jr., MD         Patient information was obtained from patient and ER records.     Subjective:     Principal Problem:Closed left hip fracture    Chief Complaint:   Chief Complaint   Patient presents with    Hip Pain     Trip and fall, left hip pain         HPI: Ms. Solomon is a 73 year old woman with PMHx of HTN, DM2- presented with hip pain after a fall.  Imaging shows proximal femur fracture.  The fall occurred by accident when patient tripped over a trailer hitch on her vehicle. She recently had knee arthroscopy and meniscectomy.  No issues with the surgery.  Dr. Tong's her orthopedist.     Pt is NPO for surgery tomorrow.    Past Medical History:   Diagnosis Date    Basal cell carcinoma     Diabetes mellitus, type 2     History of colonic polyps     HTN (hypertension)     Hyperlipidemia        Past Surgical History:   Procedure Laterality Date    ARTHROSCOPY OF KNEE Right 2013    BREAST BIOPSY      COLONOSCOPY  05/2017    EYE SURGERY Right 05/2021    CATARACT    KNEE ARTHROSCOPY W/ MENISCECTOMY Left 9/13/2023    Procedure: ARTHROSCOPY, KNEE, WITH MENISCECTOMY;  Surgeon: Sachin Tong MD;  Location: [facility] OR;  Service: Orthopedics;  Laterality: Left;    TRIGGER FINGER RELEASE Left 10/26/2022    Procedure: RELEASE, TRIGGER FINGER;  Surgeon: Sachin Tong MD;  Location: [facility] OR;  Service: Orthopedics;  Laterality: Left;       Review of patient's allergies indicates:   Allergen Reactions    Sulfa (sulfonamide antibiotics) Rash       No current facility-administered medications on file prior to encounter.     Current Outpatient Medications on File Prior to Encounter   Medication Sig    calcium carbonate (OS-SANDEEP) 500 mg calcium (1,250 mg) chewable tablet Take 1  tablet by mouth once daily.    losartan (COZAAR) 50 MG tablet Take 1 tablet (50 mg total) by mouth once daily.    metFORMIN (GLUCOPHAGE) 500 MG tablet Take 1 tablet (500 mg total) by mouth 2 (two) times daily with meals.    rosuvastatin (CRESTOR) 10 MG tablet Take 1 tablet (10 mg total) by mouth once daily.    multivitamin (THERAGRAN) per tablet Take 1 tablet by mouth once daily.     Family History       Problem Relation (Age of Onset)    Colon cancer Mother (76)    Hypertension Mother    Obesity Mother    Prostate cancer Father          Tobacco Use    Smoking status: Never    Smokeless tobacco: Never   Substance and Sexual Activity    Alcohol use: Yes     Comment: occasional    Drug use: Never    Sexual activity: Not Currently     Review of Systems   Constitutional:  Negative for activity change, appetite change, chills and fever.   HENT:  Negative for congestion, ear pain and nosebleeds.    Eyes:  Negative for itching and visual disturbance.   Respiratory:  Negative for apnea, cough, chest tightness, shortness of breath and wheezing.    Cardiovascular:  Negative for chest pain, palpitations and leg swelling.   Gastrointestinal:  Negative for abdominal distention, abdominal pain, constipation, diarrhea, nausea and vomiting.   Genitourinary:  Negative for dysuria and flank pain.   Musculoskeletal:  Negative for back pain.        Hip pain   Neurological:  Negative for dizziness and headaches.     Objective:     Vital Signs (Most Recent):  Temp: 98.2 °F (36.8 °C) (12/13/23 0009)  Pulse: 89 (12/13/23 0009)  Resp: 18 (12/13/23 0009)  BP: (!) 162/89 (12/13/23 0009)  SpO2: 96 % (12/13/23 0009) Vital Signs (24h Range):  Temp:  [98 °F (36.7 °C)-98.5 °F (36.9 °C)] 98.2 °F (36.8 °C)  Pulse:  [70-95] 89  Resp:  [16-20] 18  SpO2:  [94 %-98 %] 96 %  BP: (103-178)/(51-93) 162/89     Weight: 77.1 kg (170 lb)  Body mass index is 30.11 kg/m².     Physical Exam  Vitals reviewed.   Constitutional:       General: She is not in acute  "distress.     Appearance: Normal appearance. She is not ill-appearing, toxic-appearing or diaphoretic.   HENT:      Head: Normocephalic and atraumatic.      Mouth/Throat:      Mouth: Mucous membranes are moist.      Pharynx: Oropharynx is clear.   Eyes:      General: No scleral icterus.     Conjunctiva/sclera: Conjunctivae normal.   Neck:      Vascular: No carotid bruit.   Cardiovascular:      Rate and Rhythm: Normal rate and regular rhythm.      Pulses: Normal pulses.      Heart sounds: Normal heart sounds.   Pulmonary:      Effort: Pulmonary effort is normal.      Breath sounds: Normal breath sounds.   Abdominal:      General: Abdomen is flat. Bowel sounds are normal.      Palpations: Abdomen is soft.   Musculoskeletal:         General: Tenderness present. Normal range of motion.   Skin:     General: Skin is warm.   Neurological:      General: No focal deficit present.      Mental Status: She is alert and oriented to person, place, and time. Mental status is at baseline.   Psychiatric:         Mood and Affect: Mood normal.         Behavior: Behavior normal.                Significant Labs: All pertinent labs within the past 24 hours have been reviewed.  BMP:   Recent Labs   Lab 12/12/23  1446   *      K 4.5      CO2 26   BUN 24*   CREATININE 1.2   CALCIUM 10.4     CBC:   Recent Labs   Lab 12/12/23  1446   WBC 8.61   HGB 12.3   HCT 37.3        CMP:   Recent Labs   Lab 12/12/23  1446      K 4.5      CO2 26   *   BUN 24*   CREATININE 1.2   CALCIUM 10.4   PROT 7.6   ALBUMIN 4.5   BILITOT 1.1*   ALKPHOS 57   AST 32   ALT 25   ANIONGAP 10     Magnesium: No results for input(s): "MG" in the last 48 hours.    Significant Imaging: I have reviewed all pertinent imaging results/findings within the past 24 hours.  Assessment/Plan:     * Closed left hip fracture  Bedrest.  Admit to med surg.  Consult orthopedic surgery.  Use morphine for analgesia.  Likely to have surgery " tomorrow.      Diabetes mellitus, type 2  Patient's FSGs will be monitored.  Last A1c reviewed-   Lab Results   Component Value Date    HGBA1C 6.3 (H) 04/17/2023     Most recent fingerstick glucose reviewed-   Recent Labs   Lab 12/12/23 1814   POCTGLUCOSE 115*       Will use low dose sliding scale.  Do not resume any oral DM meds while pt admitted.    HTN (hypertension)  Chronic, controlled. Latest blood pressure and vitals reviewed-     Temp:  [98 °F (36.7 °C)-98.5 °F (36.9 °C)]   Pulse:  [70-95]   Resp:  [16-20]   BP: (103-178)/(51-93)   SpO2:  [95 %-98 %] .   Home meds for hypertension were reviewed and noted below.   Hypertension Medications               losartan (COZAAR) 50 MG tablet Take 1 tablet (50 mg total) by mouth once daily.            While in the hospital, will manage blood pressure as follows; Continue home antihypertensive regimen      VTE Risk Mitigation (From admission, onward)           Ordered     enoxaparin injection 40 mg  Once         12/12/23 2322     IP VTE HIGH RISK PATIENT  Once         12/12/23 2322     Place sequential compression device  Until discontinued         12/12/23 2322                                    Olivia Valencia MD  Department of Hospital Medicine  On license of [facility]

## 2023-12-13 NOTE — HPI
Ms. Solomon is a 73 year old woman with PMHx of HTN, DM2- presented with hip pain after a fall.  Imaging shows proximal femur fracture.  The fall occurred by accident when patient tripped over a trailer hitch on her vehicle. She recently had knee arthroscopy and meniscectomy.  No issues with the surgery.  Dr. Tong's her orthopedist.     Pt is NPO for surgery tomorrow.

## 2023-12-13 NOTE — TRANSFER OF CARE
"Anesthesia Transfer of Care Note    Patient: May Solomon    Procedure(s) Performed: Procedure(s) (LRB):  ARTHROPLASTY, HIP REPLACEMENT (Left)    Patient location: PACU    Anesthesia Type: general    Transport from OR: Transported from OR on room air with adequate spontaneous ventilation    Post pain: adequate analgesia    Post assessment: no apparent anesthetic complications and tolerated procedure well    Post vital signs: stable    Level of consciousness: awake    Nausea/Vomiting: no nausea/vomiting    Complications: none    Transfer of care protocol was followed      Last vitals: Visit Vitals  BP (!) 159/83 (BP Location: Left arm, Patient Position: Lying)   Pulse 89   Temp 36.6 °C (97.9 °F)   Resp 17   Ht 5' 3" (1.6 m)   Wt 77.1 kg (170 lb)   SpO2 (!) 94%   Breastfeeding No   BMI 30.11 kg/m²     "

## 2023-12-13 NOTE — SUBJECTIVE & OBJECTIVE
Past Medical History:   Diagnosis Date    Basal cell carcinoma     Diabetes mellitus, type 2     History of colonic polyps     HTN (hypertension)     Hyperlipidemia        Past Surgical History:   Procedure Laterality Date    ARTHROSCOPY OF KNEE Right 2013    BREAST BIOPSY      COLONOSCOPY  05/2017    EYE SURGERY Right 05/2021    CATARACT    KNEE ARTHROSCOPY W/ MENISCECTOMY Left 9/13/2023    Procedure: ARTHROSCOPY, KNEE, WITH MENISCECTOMY;  Surgeon: Sachin Tong MD;  Location: [facility] OR;  Service: Orthopedics;  Laterality: Left;    TRIGGER FINGER RELEASE Left 10/26/2022    Procedure: RELEASE, TRIGGER FINGER;  Surgeon: Sachin Tong MD;  Location: [facility] OR;  Service: Orthopedics;  Laterality: Left;       Review of patient's allergies indicates:   Allergen Reactions    Sulfa (sulfonamide antibiotics) Rash       No current facility-administered medications on file prior to encounter.     Current Outpatient Medications on File Prior to Encounter   Medication Sig    calcium carbonate (OS-SANDEEP) 500 mg calcium (1,250 mg) chewable tablet Take 1 tablet by mouth once daily.    losartan (COZAAR) 50 MG tablet Take 1 tablet (50 mg total) by mouth once daily.    metFORMIN (GLUCOPHAGE) 500 MG tablet Take 1 tablet (500 mg total) by mouth 2 (two) times daily with meals.    rosuvastatin (CRESTOR) 10 MG tablet Take 1 tablet (10 mg total) by mouth once daily.    multivitamin (THERAGRAN) per tablet Take 1 tablet by mouth once daily.     Family History       Problem Relation (Age of Onset)    Colon cancer Mother (76)    Hypertension Mother    Obesity Mother    Prostate cancer Father          Tobacco Use    Smoking status: Never    Smokeless tobacco: Never   Substance and Sexual Activity    Alcohol use: Yes     Comment: occasional    Drug use: Never    Sexual activity: Not Currently     Review of Systems   Constitutional:  Negative for fever.   Respiratory:  Negative for cough and shortness of breath.    Cardiovascular:  Negative for chest  pain.   Gastrointestinal:  Negative for abdominal pain, nausea and vomiting.   All other systems reviewed and are negative.    Objective:     Vital Signs (Most Recent):  Temp: 98.5 °F (36.9 °C) (12/12/23 1800)  Pulse: 95 (12/12/23 1830)  Resp: 18 (12/12/23 1819)  BP: (!) 111/54 (12/12/23 1830)  SpO2: 96 % (12/12/23 1830) Vital Signs (24h Range):  Temp:  [98 °F (36.7 °C)-98.5 °F (36.9 °C)] 98.5 °F (36.9 °C)  Pulse:  [70-95] 95  Resp:  [16-20] 18  SpO2:  [95 %-98 %] 96 %  BP: (103-178)/(51-93) 111/54     Weight: 77.1 kg (170 lb)  Body mass index is 30.11 kg/m².     Physical Exam  Constitutional:       General: She is not in acute distress.     Appearance: She is not diaphoretic.   HENT:      Head: Normocephalic and atraumatic.      Right Ear: External ear normal.      Left Ear: External ear normal.      Mouth/Throat:      Pharynx: No oropharyngeal exudate.   Eyes:      General: No scleral icterus.        Right eye: No discharge.         Left eye: No discharge.      Conjunctiva/sclera: Conjunctivae normal.   Neck:      Thyroid: No thyromegaly.      Vascular: No JVD.   Cardiovascular:      Rate and Rhythm: Normal rate and regular rhythm.      Heart sounds:      No gallop.   Pulmonary:      Effort: Pulmonary effort is normal.      Breath sounds: Normal breath sounds. No wheezing.   Abdominal:      General: Bowel sounds are normal. There is no distension.      Palpations: Abdomen is soft. There is no hepatomegaly or mass.      Tenderness: There is no abdominal tenderness.   Musculoskeletal:         General: No deformity.      Cervical back: Normal range of motion and neck supple.      Left hip: Bony tenderness present. Decreased range of motion.   Lymphadenopathy:      Cervical: No cervical adenopathy.   Skin:     General: Skin is warm and dry.      Findings: No rash.   Neurological:      Mental Status: She is alert and oriented to person, place, and time.   Psychiatric:         Behavior: Behavior normal. Behavior is  cooperative.                Significant Labs: All pertinent labs within the past 24 hours have been reviewed.    Significant Imaging: I have reviewed all pertinent imaging results/findings within the past 24 hours.

## 2023-12-13 NOTE — ED NOTES
Report given to Jackelin. [facility] [address] has been contacted and is aware that patient is being picked up.

## 2023-12-13 NOTE — ASSESSMENT & PLAN NOTE
Chronic, controlled. Latest blood pressure and vitals reviewed-     Temp:  [98 °F (36.7 °C)-98.5 °F (36.9 °C)]   Pulse:  [70-95]   Resp:  [16-20]   BP: (103-178)/(51-93)   SpO2:  [95 %-98 %] .   Home meds for hypertension were reviewed and noted below.   Hypertension Medications               losartan (COZAAR) 50 MG tablet Take 1 tablet (50 mg total) by mouth once daily.            While in the hospital, will manage blood pressure as follows; Continue home antihypertensive regimen

## 2023-12-13 NOTE — PLAN OF CARE
Unable to meet with patient at bedside so spoke with  pilibriannawilder fountain (Son) [phone] (Mobile)  on phone to complete initial assessment. Patient / family reports patient DOES NOT have a living will and NO ONE is medical POA. Patient lives with son at his home.     [facility]  Initial Discharge Assessment       Primary Care Provider: Irineo Freeman Jr., MD    Admission Diagnosis: Left hip pain [M25.552]    Admission Date: 12/12/2023  Expected Discharge Date: 12/16/2023    Transition of Care Barriers: (P) None    Payor: [employer] MANAGED MEDICARE / Plan: [employer] MEDICARE PLAN PPO / Product Type: Medicare Advantage /     Extended Emergency Contact Information  Primary Emergency Contact: wilder ayala  Mobile Phone: [phone]  Relation: Son   needed? No    Discharge Plan A: (P) Home with family  Discharge Plan B: (P) Home with family      [id number] [place] - [address]  [address]  [place] [address]  Phone: [phone] Fax: [phone]      Initial Assessment (most recent)       Adult Discharge Assessment - 12/13/23 1031          Discharge Assessment    Assessment Type Discharge Planning Assessment (P)      Confirmed/corrected address, phone number and insurance Yes (P)      Confirmed Demographics Correct on Facesheet (P)      Source of Information family (P)      Communicated RAVI with patient/caregiver No (P)      Reason For Admission closed left hip fracture (P)      People in Home child(oneal), adult (P)      Facility Arrived From: [facility] (P)      Do you expect to return to your current living situation? Yes (P)      Do you have help at home or someone to help you manage your care at home? Yes (P)      Who are your caregiver(s) and their phone number(s)? wilder ayala (Son) [phone] (Mobile) (P)      Walking or Climbing Stairs Difficulty no (P)      Dressing/Bathing Difficulty no (P)      Equipment Currently Used at Home cane, straight (P)       Readmission within 30 days? No (P)      Patient currently being followed by outpatient case management? No (P)      Do you currently have service(s) that help you manage your care at home? No (P)      Do you have prescription coverage? Yes (P)      Coverage [employer] managed medicare (P)      Who is going to help you get home at discharge? wilder ayala (Son) [phone] (Mobile) (P)      How do you get to doctors appointments? family or friend will provide (P)      Are you on dialysis? No (P)      Do you take coumadin? No (P)      Discharge Plan A Home with family (P)      Discharge Plan B Home with family (P)      DME Needed Upon Discharge  none (P)      Discharge Plan discussed with: Adult children (P)      Transition of Care Barriers None (P)         OTHER    Name(s) of People in Home wilder ayala (Son) [phone] (Mobile) (P)

## 2023-12-13 NOTE — H&P
[facility] Medicine  History & Physical    Patient Name: May Solomon  MRN: [id number]  Patient Class: IP- Inpatient  Admission Date: 12/12/2023  Attending Physician: Mp Stewart MD   Primary Care Provider: Irineo Freeman Jr., MD         Patient information was obtained from patient, past medical records, and ER records.     Subjective:     Principal Problem:Closed left hip fracture    Chief Complaint:   Chief Complaint   Patient presents with    Hip Pain     Trip and fall, left hip pain         HPI: Patient presented with hip pain after a fall.  Imaging shows proximal femur fracture.  The fall occurred by accident when patient tripped over a trailer hitch on her vehicle.  Medical history includes HTN, DM.  She recently had knee arthroscopy and meniscectomy.  No issues with the surgery.  Dr. Tong's her orthopedist.    Past Medical History:   Diagnosis Date    Basal cell carcinoma     Diabetes mellitus, type 2     History of colonic polyps     HTN (hypertension)     Hyperlipidemia        Past Surgical History:   Procedure Laterality Date    ARTHROSCOPY OF KNEE Right 2013    BREAST BIOPSY      COLONOSCOPY  05/2017    EYE SURGERY Right 05/2021    CATARACT    KNEE ARTHROSCOPY W/ MENISCECTOMY Left 9/13/2023    Procedure: ARTHROSCOPY, KNEE, WITH MENISCECTOMY;  Surgeon: Sachin Tong MD;  Location: [facility] OR;  Service: Orthopedics;  Laterality: Left;    TRIGGER FINGER RELEASE Left 10/26/2022    Procedure: RELEASE, TRIGGER FINGER;  Surgeon: Sachin Tong MD;  Location: [facility] OR;  Service: Orthopedics;  Laterality: Left;       Review of patient's allergies indicates:   Allergen Reactions    Sulfa (sulfonamide antibiotics) Rash       No current facility-administered medications on file prior to encounter.     Current Outpatient Medications on File Prior to Encounter   Medication Sig    calcium carbonate (OS-SANDEEP) 500 mg calcium (1,250 mg) chewable tablet Take 1 tablet by mouth once  daily.    losartan (COZAAR) 50 MG tablet Take 1 tablet (50 mg total) by mouth once daily.    metFORMIN (GLUCOPHAGE) 500 MG tablet Take 1 tablet (500 mg total) by mouth 2 (two) times daily with meals.    rosuvastatin (CRESTOR) 10 MG tablet Take 1 tablet (10 mg total) by mouth once daily.    multivitamin (THERAGRAN) per tablet Take 1 tablet by mouth once daily.     Family History       Problem Relation (Age of Onset)    Colon cancer Mother (76)    Hypertension Mother    Obesity Mother    Prostate cancer Father          Tobacco Use    Smoking status: Never    Smokeless tobacco: Never   Substance and Sexual Activity    Alcohol use: Yes     Comment: occasional    Drug use: Never    Sexual activity: Not Currently     Review of Systems   Constitutional:  Negative for fever.   Respiratory:  Negative for cough and shortness of breath.    Cardiovascular:  Negative for chest pain.   Gastrointestinal:  Negative for abdominal pain, nausea and vomiting.   All other systems reviewed and are negative.    Objective:     Vital Signs (Most Recent):  Temp: 98.5 °F (36.9 °C) (12/12/23 1800)  Pulse: 95 (12/12/23 1830)  Resp: 18 (12/12/23 1819)  BP: (!) 111/54 (12/12/23 1830)  SpO2: 96 % (12/12/23 1830) Vital Signs (24h Range):  Temp:  [98 °F (36.7 °C)-98.5 °F (36.9 °C)] 98.5 °F (36.9 °C)  Pulse:  [70-95] 95  Resp:  [16-20] 18  SpO2:  [95 %-98 %] 96 %  BP: (103-178)/(51-93) 111/54     Weight: 77.1 kg (170 lb)  Body mass index is 30.11 kg/m².     Physical Exam  Constitutional:       General: She is not in acute distress.     Appearance: She is not diaphoretic.   HENT:      Head: Normocephalic and atraumatic.      Right Ear: External ear normal.      Left Ear: External ear normal.      Mouth/Throat:      Pharynx: No oropharyngeal exudate.   Eyes:      General: No scleral icterus.        Right eye: No discharge.         Left eye: No discharge.      Conjunctiva/sclera: Conjunctivae normal.   Neck:      Thyroid: No thyromegaly.      Vascular:  No JVD.   Cardiovascular:      Rate and Rhythm: Normal rate and regular rhythm.      Heart sounds:      No gallop.   Pulmonary:      Effort: Pulmonary effort is normal.      Breath sounds: Normal breath sounds. No wheezing.   Abdominal:      General: Bowel sounds are normal. There is no distension.      Palpations: Abdomen is soft. There is no hepatomegaly or mass.      Tenderness: There is no abdominal tenderness.   Musculoskeletal:         General: No deformity.      Cervical back: Normal range of motion and neck supple.      Left hip: Bony tenderness present. Decreased range of motion.   Lymphadenopathy:      Cervical: No cervical adenopathy.   Skin:     General: Skin is warm and dry.      Findings: No rash.   Neurological:      Mental Status: She is alert and oriented to person, place, and time.   Psychiatric:         Behavior: Behavior normal. Behavior is cooperative.                Significant Labs: All pertinent labs within the past 24 hours have been reviewed.    Significant Imaging: I have reviewed all pertinent imaging results/findings within the past 24 hours.  Assessment/Plan:     * Closed left hip fracture  Bedrest.  Admit to med surg.  Consult orthopedic surgery.  Use morphine for analgesia.  Likely to have surgery tomorrow.      Diabetes mellitus, type 2  Patient's FSGs will be monitored.  Last A1c reviewed-   Lab Results   Component Value Date    HGBA1C 6.3 (H) 04/17/2023     Most recent fingerstick glucose reviewed-   Recent Labs   Lab 12/12/23  1814   POCTGLUCOSE 115*     Will use low dose sliding scale.  Do not resume any oral DM meds while pt admitted.    HTN (hypertension)  Chronic, controlled. Latest blood pressure and vitals reviewed-     Temp:  [98 °F (36.7 °C)-98.5 °F (36.9 °C)]   Pulse:  [70-95]   Resp:  [16-20]   BP: (103-178)/(51-93)   SpO2:  [95 %-98 %] .   Home meds for hypertension were reviewed and noted below.   Hypertension Medications               losartan (COZAAR) 50 MG tablet  Take 1 tablet (50 mg total) by mouth once daily.            While in the hospital, will manage blood pressure as follows; Continue home antihypertensive regimen      VTE Risk Mitigation (From admission, onward)      None                            Mp Stewart MD  Department of Hospital Medicine  [facility]

## 2023-12-13 NOTE — NURSING
Nurses Note -- 4 Eyes      12/12/23   2320 PM      Skin assessed during: Transfer      [] No Altered Skin Integrity Present    []Prevention Measures Documented      [x] Yes- Altered Skin Integrity Present or Discovered   [x] LDA Added if Not in Epic (Describe Wound)   [x] New Altered Skin Integrity was Present on Admit and Documented in LDA   [x] Wound Image Taken    Wound Care Consulted? No    Attending Nurse:  Jose Avendaño RN/Staff Member:   dane

## 2023-12-14 PROBLEM — Z96.642 STATUS POST LEFT HIP REPLACEMENT: Status: ACTIVE | Noted: 2023-12-14

## 2023-12-14 LAB
ALBUMIN SERPL BCP-MCNC: 3.7 G/DL (ref 3.5–5.2)
ALP SERPL-CCNC: 40 U/L (ref 55–135)
ALT SERPL W/O P-5'-P-CCNC: 18 U/L (ref 10–44)
ANION GAP SERPL CALC-SCNC: 4 MMOL/L (ref 8–16)
AST SERPL-CCNC: 34 U/L (ref 10–40)
BASOPHILS # BLD AUTO: 0.01 K/UL (ref 0–0.2)
BASOPHILS NFR BLD: 0.1 % (ref 0–1.9)
BILIRUB SERPL-MCNC: 0.6 MG/DL (ref 0.1–1)
BUN SERPL-MCNC: 24 MG/DL (ref 8–23)
CALCIUM SERPL-MCNC: 8.7 MG/DL (ref 8.7–10.5)
CHLORIDE SERPL-SCNC: 102 MMOL/L (ref 95–110)
CO2 SERPL-SCNC: 30 MMOL/L (ref 23–29)
CREAT SERPL-MCNC: 0.9 MG/DL (ref 0.5–1.4)
DIFFERENTIAL METHOD BLD: ABNORMAL
EOSINOPHIL # BLD AUTO: 0 K/UL (ref 0–0.5)
EOSINOPHIL NFR BLD: 0.1 % (ref 0–8)
ERYTHROCYTE [DISTWIDTH] IN BLOOD BY AUTOMATED COUNT: 13.3 % (ref 11.5–14.5)
EST. GFR  (NO RACE VARIABLE): >60 ML/MIN/1.73 M^2
GLUCOSE SERPL-MCNC: 128 MG/DL (ref 70–110)
GLUCOSE SERPL-MCNC: 159 MG/DL (ref 70–110)
HCT VFR BLD AUTO: 30.6 % (ref 37–48.5)
HGB BLD-MCNC: 10 G/DL (ref 12–16)
IMM GRANULOCYTES # BLD AUTO: 0.01 K/UL (ref 0–0.04)
IMM GRANULOCYTES NFR BLD AUTO: 0.1 % (ref 0–0.5)
LYMPHOCYTES # BLD AUTO: 1.3 K/UL (ref 1–4.8)
LYMPHOCYTES NFR BLD: 17.2 % (ref 18–48)
MCH RBC QN AUTO: 29.4 PG (ref 27–31)
MCHC RBC AUTO-ENTMCNC: 32.7 G/DL (ref 32–36)
MCV RBC AUTO: 90 FL (ref 82–98)
MONOCYTES # BLD AUTO: 0.8 K/UL (ref 0.3–1)
MONOCYTES NFR BLD: 10.4 % (ref 4–15)
NEUTROPHILS # BLD AUTO: 5.5 K/UL (ref 1.8–7.7)
NEUTROPHILS NFR BLD: 72.1 % (ref 38–73)
NRBC BLD-RTO: 0 /100 WBC
PLATELET # BLD AUTO: 190 K/UL (ref 150–450)
PMV BLD AUTO: 10.1 FL (ref 9.2–12.9)
POTASSIUM SERPL-SCNC: 4.1 MMOL/L (ref 3.5–5.1)
PROT SERPL-MCNC: 6 G/DL (ref 6–8.4)
RBC # BLD AUTO: 3.4 M/UL (ref 4–5.4)
SODIUM SERPL-SCNC: 136 MMOL/L (ref 136–145)
WBC # BLD AUTO: 7.61 K/UL (ref 3.9–12.7)

## 2023-12-14 PROCEDURE — 97530 THERAPEUTIC ACTIVITIES: CPT

## 2023-12-14 PROCEDURE — 85025 COMPLETE CBC W/AUTO DIFF WBC: CPT | Performed by: INTERNAL MEDICINE

## 2023-12-14 PROCEDURE — 97165 OT EVAL LOW COMPLEX 30 MIN: CPT

## 2023-12-14 PROCEDURE — 99900035 HC TECH TIME PER 15 MIN (STAT)

## 2023-12-14 PROCEDURE — 36415 COLL VENOUS BLD VENIPUNCTURE: CPT | Performed by: INTERNAL MEDICINE

## 2023-12-14 PROCEDURE — S5010 5% DEXTROSE AND 0.45% SALINE: HCPCS | Performed by: ORTHOPAEDIC SURGERY

## 2023-12-14 PROCEDURE — 80053 COMPREHEN METABOLIC PANEL: CPT | Performed by: INTERNAL MEDICINE

## 2023-12-14 PROCEDURE — 99900031 HC PATIENT EDUCATION (STAT)

## 2023-12-14 PROCEDURE — 30200315 PPD INTRADERMAL TEST REV CODE 302: Performed by: STUDENT IN AN ORGANIZED HEALTH CARE EDUCATION/TRAINING PROGRAM

## 2023-12-14 PROCEDURE — 97161 PT EVAL LOW COMPLEX 20 MIN: CPT

## 2023-12-14 PROCEDURE — 97535 SELF CARE MNGMENT TRAINING: CPT

## 2023-12-14 PROCEDURE — 12000002 HC ACUTE/MED SURGE SEMI-PRIVATE ROOM

## 2023-12-14 PROCEDURE — 25000003 PHARM REV CODE 250: Performed by: ORTHOPAEDIC SURGERY

## 2023-12-14 PROCEDURE — 86580 TB INTRADERMAL TEST: CPT | Performed by: STUDENT IN AN ORGANIZED HEALTH CARE EDUCATION/TRAINING PROGRAM

## 2023-12-14 PROCEDURE — 94761 N-INVAS EAR/PLS OXIMETRY MLT: CPT

## 2023-12-14 PROCEDURE — 94799 UNLISTED PULMONARY SVC/PX: CPT

## 2023-12-14 PROCEDURE — 97116 GAIT TRAINING THERAPY: CPT

## 2023-12-14 PROCEDURE — 63600175 PHARM REV CODE 636 W HCPCS: Performed by: ORTHOPAEDIC SURGERY

## 2023-12-14 RX ADMIN — TUBERCULIN PURIFIED PROTEIN DERIVATIVE 5 UNITS: 5 INJECTION INTRADERMAL at 04:12

## 2023-12-14 RX ADMIN — BISACODYL 10 MG: 10 SUPPOSITORY RECTAL at 09:12

## 2023-12-14 RX ADMIN — ASPIRIN 81 MG 81 MG: 81 TABLET ORAL at 08:12

## 2023-12-14 RX ADMIN — DEXTROSE AND SODIUM CHLORIDE: 5; .45 INJECTION, SOLUTION INTRAVENOUS at 01:12

## 2023-12-14 RX ADMIN — HYDROCODONE BITARTRATE AND ACETAMINOPHEN 1 TABLET: 5; 325 TABLET ORAL at 09:12

## 2023-12-14 RX ADMIN — MUPIROCIN 1 G: 20 OINTMENT TOPICAL at 08:12

## 2023-12-14 RX ADMIN — HYDROCODONE BITARTRATE AND ACETAMINOPHEN 1 TABLET: 5; 325 TABLET ORAL at 07:12

## 2023-12-14 RX ADMIN — MUPIROCIN 1 G: 20 OINTMENT TOPICAL at 09:12

## 2023-12-14 RX ADMIN — LOSARTAN POTASSIUM 50 MG: 50 TABLET, FILM COATED ORAL at 09:12

## 2023-12-14 RX ADMIN — CEFAZOLIN SODIUM 1 G: 1 SOLUTION INTRAVENOUS at 09:12

## 2023-12-14 RX ADMIN — ASPIRIN 81 MG 81 MG: 81 TABLET ORAL at 09:12

## 2023-12-14 RX ADMIN — CEFAZOLIN SODIUM 1 G: 1 SOLUTION INTRAVENOUS at 01:12

## 2023-12-14 NOTE — PROGRESS NOTES
"[facility]  Orthopedics  Progress Note    Patient Name: May Solomon  MRN: [id number]  Admission Date: 12/12/2023  Hospital Length of Stay: 2 days  Attending Provider: Morgan Capellan DO  Primary Care Provider: Irineo Freeman Jr., MD  Follow-up For: Procedure(s) (LRB):  ARTHROPLASTY, HIP REPLACEMENT (Left)    Post-Operative Day: 1 Day Post-Op  Subjective:     Principal Problem:Closed left hip fracture    Principal Orthopedic Problem: S/P L VI 2/2 Fx    Interval History: none    Review of patient's allergies indicates:   Allergen Reactions    Sulfa (sulfonamide antibiotics) Rash       Current Facility-Administered Medications   Medication    aspirin chewable tablet 81 mg    bisacodyL suppository 10 mg    ceFAZolin (ANCEF) 1 gram in dextrose 5 % 50 mL IVPB (premix)    dextrose 10% bolus 125 mL 125 mL    dextrose 10% bolus 250 mL 250 mL    dextrose 5 % and 0.45 % NaCl infusion    enoxaparin injection 40 mg    glucagon (human recombinant) injection 1 mg    glucose chewable tablet 16 g    glucose chewable tablet 24 g    HYDROcodone-acetaminophen 5-325 mg per tablet 1 tablet    insulin aspart U-100 pen 0-5 Units    losartan tablet 50 mg    morphine injection 6 mg    mupirocin 2 % ointment 1 g    naloxone 0.4 mg/mL injection 0.02 mg    ondansetron injection 4 mg    sodium chloride 0.9% flush 10 mL    sodium chloride 0.9% flush 5 mL    zolpidem tablet 5 mg     Objective:     Vital Signs (Most Recent):  Temp: 98.4 °F (36.9 °C) (12/14/23 0423)  Pulse: 79 (12/14/23 0423)  Resp: 16 (12/14/23 0423)  BP: 129/68 (12/14/23 0423)  SpO2: 99 % (12/14/23 0423) Vital Signs (24h Range):  Temp:  [97.3 °F (36.3 °C)-98.5 °F (36.9 °C)] 98.4 °F (36.9 °C)  Pulse:  [78-98] 79  Resp:  [12-22] 16  SpO2:  [94 %-100 %] 99 %  BP: (110-159)/(54-83) 129/68     Weight: 77.1 kg (170 lb)  Height: 5' 3" (160 cm)  Body mass index is 30.11 kg/m².      Intake/Output Summary (Last 24 hours) at 12/14/2023 0659  Last data filed at " 12/14/2023 0459  Gross per 24 hour   Intake 1480 ml   Output 2550 ml   Net -1070 ml        General    Nursing note and vitals reviewed.  Constitutional: She is oriented to person, place, and time. She appears well-developed and well-nourished.   Pulmonary/Chest: Effort normal.   Neurological: She is alert and oriented to person, place, and time.   Psychiatric: She has a normal mood and affect. Her behavior is normal.         Left Hip Exam     Comments:  LLE DNVI. Post-op dressing C/D/I. Rodriguez remains             Significant Labs: CBC:   Recent Labs   Lab 12/12/23  1446 12/13/23  0536 12/14/23  0547   WBC 8.61 8.91 7.61   HGB 12.3 11.3* 10.0*   HCT 37.3 34.8* 30.6*    234 190     CMP:   Recent Labs   Lab 12/12/23  1446 12/14/23  0547    136   K 4.5 4.1    102   CO2 26 30*   * 159*   BUN 24* 24*   CREATININE 1.2 0.9   CALCIUM 10.4 8.7   PROT 7.6 6.0   ALBUMIN 4.5 3.7   BILITOT 1.1* 0.6   ALKPHOS 57 40*   AST 32 34   ALT 25 18   ANIONGAP 10 4*     All pertinent labs within the past 24 hours have been reviewed.    Significant Imaging: X-Ray: I have reviewed all pertinent results/findings and my personal findings are:  L hip post-op X-rays normal VI  Assessment/Plan:     Status post left hip replacement  DC Rodriguez this am  OOB with PT and nursing  LLE WBAT  Change dressing qd  DC planning          DOUG NASSAR  Orthopedics  [facility]

## 2023-12-14 NOTE — ASSESSMENT & PLAN NOTE
Bedrest.  Admit to med surg.  Postop day 1 from hip fracture repair  Continue pain control  Weight-bearing as tolerated  Continue PT and OT  DVT prophylaxis with 81 mg ASA b.i.d..  Will need placement

## 2023-12-14 NOTE — ASSESSMENT & PLAN NOTE
Doing well postoperatively, she was very weak and will need placement for rehab prior to returning home.

## 2023-12-14 NOTE — SUBJECTIVE & OBJECTIVE
Interval History:  Patient admitted after hip fracture repair yesterday.  She is very weak and having some pain today.  Will likely need rehab placement as she does not have much help at home.    Review of Systems   All other systems reviewed and are negative.    Objective:     Vital Signs (Most Recent):  Temp: 97.9 °F (36.6 °C) (12/14/23 1300)  Pulse: 95 (12/14/23 1300)  Resp: 18 (12/14/23 1300)  BP: (!) 143/81 (12/14/23 1300)  SpO2: 99 % (12/14/23 1300) Vital Signs (24h Range):  Temp:  [97.7 °F (36.5 °C)-98.5 °F (36.9 °C)] 97.9 °F (36.6 °C)  Pulse:  [79-98] 95  Resp:  [16-18] 18  SpO2:  [98 %-99 %] 99 %  BP: (126-143)/(54-81) 143/81     Weight: 77.1 kg (170 lb)  Body mass index is 30.11 kg/m².    Intake/Output Summary (Last 24 hours) at 12/14/2023 1416  Last data filed at 12/14/2023 1041  Gross per 24 hour   Intake 230 ml   Output 1925 ml   Net -1695 ml         Physical Exam  Constitutional:       Appearance: Normal appearance. She is normal weight.   HENT:      Head: Normocephalic and atraumatic.      Nose: Nose normal.      Mouth/Throat:      Mouth: Mucous membranes are moist.   Eyes:      Conjunctiva/sclera: Conjunctivae normal.      Pupils: Pupils are equal, round, and reactive to light.   Cardiovascular:      Rate and Rhythm: Normal rate and regular rhythm.      Pulses: Normal pulses.      Heart sounds: Normal heart sounds. No murmur heard.     No friction rub. No gallop.   Pulmonary:      Effort: Pulmonary effort is normal.      Breath sounds: Normal breath sounds. No wheezing or rales.   Abdominal:      General: Abdomen is flat. Bowel sounds are normal. There is no distension.      Palpations: Abdomen is soft.      Tenderness: There is no abdominal tenderness. There is no guarding.   Musculoskeletal:         General: No swelling. Normal range of motion.      Cervical back: Normal range of motion and neck supple.      Comments: Status post left hip fracture repair.  Neurovascularly intact distally.   Skin:      General: Skin is warm and dry.   Neurological:      General: No focal deficit present.      Mental Status: She is alert.   Psychiatric:         Mood and Affect: Mood normal.         Thought Content: Thought content normal.         Judgment: Judgment normal.             Significant Labs: All pertinent labs within the past 24 hours have been reviewed.    Significant Imaging: I have reviewed all pertinent imaging results/findings within the past 24 hours.

## 2023-12-14 NOTE — PLAN OF CARE
Problem: Infection  Goal: Absence of Infection Signs and Symptoms  Outcome: Ongoing, Progressing     Problem: Adult Inpatient Plan of Care  Goal: Plan of Care Review  Outcome: Ongoing, Progressing  Goal: Patient-Specific Goal (Individualized)  Outcome: Ongoing, Progressing  Goal: Absence of Hospital-Acquired Illness or Injury  Outcome: Ongoing, Progressing  Goal: Optimal Comfort and Wellbeing  Outcome: Ongoing, Progressing  Goal: Readiness for Transition of Care  Outcome: Ongoing, Progressing     Problem: Fall Injury Risk  Goal: Absence of Fall and Fall-Related Injury  Outcome: Ongoing, Progressing     Problem: Skin Injury Risk Increased  Goal: Skin Health and Integrity  Outcome: Ongoing, Progressing     Problem: Diabetes Comorbidity  Goal: Blood Glucose Level Within Targeted Range  Outcome: Ongoing, Progressing     Problem: Impaired Wound Healing  Goal: Optimal Wound Healing  Outcome: Ongoing, Progressing

## 2023-12-14 NOTE — PLAN OF CARE
Goals to be met by: 24     Patient will increase functional independence with mobility by performin. Supine to sit with CGA maintaining VI precautions  2. Sit to stand transfer with Supervision, WBAT L LE  3. Bed to chair transfer with Supervision using Rolling Walker, maintaining VI precautions  4. Gait  x 150 feet with Supervision using Rolling Walker, WBAT L LE.

## 2023-12-14 NOTE — SUBJECTIVE & OBJECTIVE
"Principal Problem:Closed left hip fracture    Principal Orthopedic Problem: S/P L VI 2/2 Fx    Interval History: none    Review of patient's allergies indicates:   Allergen Reactions    Sulfa (sulfonamide antibiotics) Rash       Current Facility-Administered Medications   Medication    aspirin chewable tablet 81 mg    bisacodyL suppository 10 mg    ceFAZolin (ANCEF) 1 gram in dextrose 5 % 50 mL IVPB (premix)    dextrose 10% bolus 125 mL 125 mL    dextrose 10% bolus 250 mL 250 mL    dextrose 5 % and 0.45 % NaCl infusion    enoxaparin injection 40 mg    glucagon (human recombinant) injection 1 mg    glucose chewable tablet 16 g    glucose chewable tablet 24 g    HYDROcodone-acetaminophen 5-325 mg per tablet 1 tablet    insulin aspart U-100 pen 0-5 Units    losartan tablet 50 mg    morphine injection 6 mg    mupirocin 2 % ointment 1 g    naloxone 0.4 mg/mL injection 0.02 mg    ondansetron injection 4 mg    sodium chloride 0.9% flush 10 mL    sodium chloride 0.9% flush 5 mL    zolpidem tablet 5 mg     Objective:     Vital Signs (Most Recent):  Temp: 98.4 °F (36.9 °C) (12/14/23 0423)  Pulse: 79 (12/14/23 0423)  Resp: 16 (12/14/23 0423)  BP: 129/68 (12/14/23 0423)  SpO2: 99 % (12/14/23 0423) Vital Signs (24h Range):  Temp:  [97.3 °F (36.3 °C)-98.5 °F (36.9 °C)] 98.4 °F (36.9 °C)  Pulse:  [78-98] 79  Resp:  [12-22] 16  SpO2:  [94 %-100 %] 99 %  BP: (110-159)/(54-83) 129/68     Weight: 77.1 kg (170 lb)  Height: 5' 3" (160 cm)  Body mass index is 30.11 kg/m².      Intake/Output Summary (Last 24 hours) at 12/14/2023 0659  Last data filed at 12/14/2023 0459  Gross per 24 hour   Intake 1480 ml   Output 2550 ml   Net -1070 ml        General    Nursing note and vitals reviewed.  Constitutional: She is oriented to person, place, and time. She appears well-developed and well-nourished.   Pulmonary/Chest: Effort normal.   Neurological: She is alert and oriented to person, place, and time.   Psychiatric: She has a normal mood and " affect. Her behavior is normal.         Left Hip Exam     Comments:  LLE DNVI. Post-op dressing C/D/I. Rodriguez remains             Significant Labs: CBC:   Recent Labs   Lab 12/12/23  1446 12/13/23  0536 12/14/23  0547   WBC 8.61 8.91 7.61   HGB 12.3 11.3* 10.0*   HCT 37.3 34.8* 30.6*    234 190     CMP:   Recent Labs   Lab 12/12/23  1446 12/14/23  0547    136   K 4.5 4.1    102   CO2 26 30*   * 159*   BUN 24* 24*   CREATININE 1.2 0.9   CALCIUM 10.4 8.7   PROT 7.6 6.0   ALBUMIN 4.5 3.7   BILITOT 1.1* 0.6   ALKPHOS 57 40*   AST 32 34   ALT 25 18   ANIONGAP 10 4*     All pertinent labs within the past 24 hours have been reviewed.    Significant Imaging: X-Ray: I have reviewed all pertinent results/findings and my personal findings are:  L hip post-op X-rays normal VI

## 2023-12-14 NOTE — PT/OT/SLP EVAL
Physical Therapy Evaluation    Patient Name:  May Solomon   MRN:  [id number]    Recommendations:     Discharge Recommendations: High Intensity Therapy   Discharge Equipment Recommendations:  (TBD at next level of care)   Barriers to discharge: Decreased caregiver support and pt currently requiring assist of 1-2 persons for safety with mobility    Assessment:     May Solomon is a 73 y.o. female admitted with a medical diagnosis of Closed left hip fracture.  She presents with the following impairments/functional limitations: weakness, impaired endurance, impaired self care skills, impaired functional mobility, gait instability, impaired balance, decreased lower extremity function, decreased safety awareness, pain, edema, impaired cardiopulmonary response to activity, orthopedic precautions.    Pt found HOB elevated and agreeable to working with PT. Pt A & O x  4 and has the following co-morbidities: HTN, DM, L knee meniscectomy 9/23.  Pt tolerated session fairly due to orthostatic hypotension and required moderate to minimal assist of 2 persons for safe mobilization during session today. During gait training pt felt weak and needed to sit, but even after sitting she reported increasing dizziness and onset of nausea requiring transfer back into bed. Pt would benefit from acute PT during hospitalization to increase strength, endurance and safety with mobility. The pt presents with good participation and motivation to return to prior level of function with High Intensity Therapy. The pt demonstrates appropriate strength, pain control, cognitive ability and within the next 1-2 days(once initial orthostasis is resolved) is expected to demonstrate appropriate endurance to participate in up to 3 hours of combined therapy each day.      Rehab Prognosis: Good and Fair; patient would benefit from acute skilled PT services to address these deficits and reach maximum level of function.    Recent Surgery:  Procedure(s) (LRB):  ARTHROPLASTY, HIP REPLACEMENT (Left) 1 Day Post-Op    Plan:     During this hospitalization, patient to be seen daily to address the identified rehab impairments via gait training, therapeutic activities, therapeutic exercises, neuromuscular re-education and progress toward the following goals:    Plan of Care Expires:  01/14/24    Subjective     Chief Complaint: dizziness, nausea during gait training today  Patient/Family Comments/goals: High Intensity therapy prior to discharge home. Pt lives with her son, but he works during the day.  Pain/Comfort:  Pain Rating 1: 2/10  Location - Side 1: Left  Location 1: hip  Pain Addressed 1: Pre-medicate for activity, Reposition, Distraction, Cessation of Activity, Nurse notified  Pain Rating Post-Intervention 1: 5/10    Patients cultural, spiritual, Sabianist conflicts given the current situation:      Living Environment:  Pt lives with her son in a H with no steps to enter from garage.  Prior to admission, patients level of function was MI with SC > Independent, drives.  Equipment used at home: cane, straight.  DME owned (not currently used): none.  Upon discharge, patient will have assistance from facility staff.    Objective:     Communicated with DICK Venegas prior to session.  Patient found HOB elevated with bed alarm, pérez catheter, hip abduction pillow, peripheral IV, telemetry  upon PT entry to room.    General Precautions: Standard, fall  Orthopedic Precautions:LLE weight bearing as tolerated, LLE posterior precautions   Braces: N/A  Respiratory Status: Room air    Exams:  Cognitive Exam:  Patient is oriented to Person, Place, Time, and Situation  RLE ROM: WFL  RLE Strength: WFL  LLE ROM: grossly limited approximately 50% due to edema/post-op weakness  LLE Strength: NT    Functional Mobility:  Bed Mobility:     Scooting: moderate assistance and of 2 persons  Supine to Sit: moderate assistance and of 2 persons  Sit to Supine: moderate assistance  and of 2 persons  Transfers:     Sit to Stand:  minimum assistance, moderate assistance, and of 2 persons with rolling walker  Bed to Chair: minimum assistance and of 2 persons with  rolling walker  using  Step Transfer  Gait: x 6 feet with RW and minimal A of 2 persons for balance and vc for sequencing, WBAT L LE. Pt c/o feeling weak and needed to sit. Once sitting, though, pt reported increasing dizziness and nausea. PT & tech pushed chair next to bed and pt was transferred to standing for step turn transfer with RW to EOB and pt returned to supine/positioned for comfort & RN was notified.      AM-PAC 6 CLICK MOBILITY  Total Score:13       Treatment & Education:  Pt was educated on the following: call light use, importance of OOB activity and functional mobility to negate the negative effects of prolonged bed rest during this hospitalization, safe transfers/ambulation and discharge planning recommendations/options.      Patient left HOB elevated with all lines intact, call button in reach, bed alarm on, and RN notified.    GOALS:   Multidisciplinary Problems       Physical Therapy Goals          Problem: Physical Therapy    Goal Priority Disciplines Outcome Goal Variances Interventions   Physical Therapy Goal     PT, PT/OT      Description: Goals to be met by: 24     Patient will increase functional independence with mobility by performin. Supine to sit with CGA maintaining VI precautions  2. Sit to stand transfer with Supervision, WBAT L LE  3. Bed to chair transfer with Supervision using Rolling Walker, maintaining VI precautions  4. Gait  x 150 feet with Supervision using Rolling Walker, WBAT L LE.                              History:     Past Medical History:   Diagnosis Date    Basal cell carcinoma     Diabetes mellitus, type 2     History of colonic polyps     HTN (hypertension)     Hyperlipidemia        Past Surgical History:   Procedure Laterality Date    ARTHROSCOPY OF KNEE Right      BREAST BIOPSY      COLONOSCOPY  05/2017    EYE SURGERY Right 05/2021    CATARACT    HIP REPLACEMENT ARTHROPLASTY Left 12/13/2023    Procedure: ARTHROPLASTY, HIP REPLACEMENT;  Surgeon: Sachin Tong MD;  Location: [facility];  Service: Orthopedics;  Laterality: Left;    KNEE ARTHROSCOPY W/ MENISCECTOMY Left 9/13/2023    Procedure: ARTHROSCOPY, KNEE, WITH MENISCECTOMY;  Surgeon: Sachin Tong MD;  Location: [facility];  Service: Orthopedics;  Laterality: Left;    TRIGGER FINGER RELEASE Left 10/26/2022    Procedure: RELEASE, TRIGGER FINGER;  Surgeon: Sachin Tong MD;  Location: [facility];  Service: Orthopedics;  Laterality: Left;       Time Tracking:     PT Received On: 12/14/23  PT Start Time: 1025     PT Stop Time: 1058  PT Total Time (min): 33 min     Billable Minutes: Evaluation 10, Gait Training 10, and Therapeutic Activity 13      12/14/2023

## 2023-12-14 NOTE — PLAN OF CARE
12/14/23 1518   Post-Acute Status   Post-Acute Authorization Placement   Post-Acute Placement Status Referrals Sent     [facility] Alert: NO response from [facility] re: Referral [id number] for patient in [id number]: No, unable to accept patient no female bed      [facility] Alert: YES response from [facility] re: Referral [id number] for patient in [id number]: Yes, willing to accept patient running financials

## 2023-12-14 NOTE — ANESTHESIA POSTPROCEDURE EVALUATION
Anesthesia Post Evaluation    Patient: May Solomon    Procedure(s) Performed: Procedure(s) (LRB):  ARTHROPLASTY, HIP REPLACEMENT (Left)    Final Anesthesia Type: general      Patient location during evaluation: PACU  Patient participation: Yes- Able to Participate  Level of consciousness: awake and alert  Post-procedure vital signs: reviewed and stable  Pain management: adequate  Airway patency: patent    PONV status at discharge: No PONV  Anesthetic complications: no      Cardiovascular status: hemodynamically stable  Respiratory status: unassisted, spontaneous ventilation and room air  Hydration status: euvolemic  Follow-up not needed.              Vitals Value Taken Time   /71 12/13/23 2014   Temp 36.9 °C (98.5 °F) 12/13/23 2014   Pulse 98 12/13/23 2014   Resp 18 12/13/23 2014   SpO2 98 % 12/13/23 2014         Event Time   Out of Recovery 12/13/2023 12:32:12         Pain/Blessing Score: Pain Rating Prior to Med Admin: 4 (12/13/2023  5:00 PM)  Pain Rating Post Med Admin: 0 (12/13/2023  5:00 PM)  Blsesing Score: 9 (12/13/2023  5:00 PM)

## 2023-12-14 NOTE — PROGRESS NOTES
On license of [facility] Medicine  Progress Note    Patient Name: May Solomon  MRN: [id number]  Patient Class: IP- Inpatient   Admission Date: 12/12/2023  Length of Stay: 2 days  Attending Physician: Ankit Bueno MD  Primary Care Provider: Irineo Freeman Jr., MD        Subjective:     Principal Problem:Closed left hip fracture        HPI:  Ms. Solomon is a 73 year old woman with PMHx of HTN, DM2- presented with hip pain after a fall.  Imaging shows proximal femur fracture.  The fall occurred by accident when patient tripped over a trailer hitch on her vehicle. She recently had knee arthroscopy and meniscectomy.  No issues with the surgery.  Dr. Tong's her orthopedist.     Pt is NPO for surgery tomorrow.    Overview/Hospital Course:  No notes on file    Interval History:  Patient admitted after hip fracture repair yesterday.  She is very weak and having some pain today.  Will likely need rehab placement as she does not have much help at home.    Review of Systems   All other systems reviewed and are negative.    Objective:     Vital Signs (Most Recent):  Temp: 97.9 °F (36.6 °C) (12/14/23 1300)  Pulse: 95 (12/14/23 1300)  Resp: 18 (12/14/23 1300)  BP: (!) 143/81 (12/14/23 1300)  SpO2: 99 % (12/14/23 1300) Vital Signs (24h Range):  Temp:  [97.7 °F (36.5 °C)-98.5 °F (36.9 °C)] 97.9 °F (36.6 °C)  Pulse:  [79-98] 95  Resp:  [16-18] 18  SpO2:  [98 %-99 %] 99 %  BP: (126-143)/(54-81) 143/81     Weight: 77.1 kg (170 lb)  Body mass index is 30.11 kg/m².    Intake/Output Summary (Last 24 hours) at 12/14/2023 1416  Last data filed at 12/14/2023 1041  Gross per 24 hour   Intake 230 ml   Output 1925 ml   Net -1695 ml         Physical Exam  Constitutional:       Appearance: Normal appearance. She is normal weight.   HENT:      Head: Normocephalic and atraumatic.      Nose: Nose normal.      Mouth/Throat:      Mouth: Mucous membranes are moist.   Eyes:      Conjunctiva/sclera: Conjunctivae normal.  "     Pupils: Pupils are equal, round, and reactive to light.   Cardiovascular:      Rate and Rhythm: Normal rate and regular rhythm.      Pulses: Normal pulses.      Heart sounds: Normal heart sounds. No murmur heard.     No friction rub. No gallop.   Pulmonary:      Effort: Pulmonary effort is normal.      Breath sounds: Normal breath sounds. No wheezing or rales.   Abdominal:      General: Abdomen is flat. Bowel sounds are normal. There is no distension.      Palpations: Abdomen is soft.      Tenderness: There is no abdominal tenderness. There is no guarding.   Musculoskeletal:         General: No swelling. Normal range of motion.      Cervical back: Normal range of motion and neck supple.      Comments: Status post left hip fracture repair.  Neurovascularly intact distally.   Skin:     General: Skin is warm and dry.   Neurological:      General: No focal deficit present.      Mental Status: She is alert.   Psychiatric:         Mood and Affect: Mood normal.         Thought Content: Thought content normal.         Judgment: Judgment normal.             Significant Labs: All pertinent labs within the past 24 hours have been reviewed.    Significant Imaging: I have reviewed all pertinent imaging results/findings within the past 24 hours.    Assessment/Plan:      * Closed left hip fracture  Bedrest.  Admit to med surg.  Postop day 1 from hip fracture repair  Continue pain control  Weight-bearing as tolerated  Continue PT and OT  DVT prophylaxis with 81 mg ASA b.i.d..  Will need placement      Status post left hip replacement  Doing well postoperatively, she was very weak and will need placement for rehab prior to returning home.      Diabetes mellitus, type 2  Patient's FSGs will be monitored.  Last A1c reviewed-   Lab Results   Component Value Date    HGBA1C 6.3 (H) 04/17/2023     Most recent fingerstick glucose reviewed-   No results for input(s): "POCTGLUCOSE" in the last 24 hours.    Will use low dose sliding " scale.  Do not resume any oral DM meds while pt admitted.    HTN (hypertension)  Chronic, controlled. Latest blood pressure and vitals reviewed-     Temp:  [98 °F (36.7 °C)-98.5 °F (36.9 °C)]   Pulse:  [70-95]   Resp:  [16-20]   BP: (103-178)/(51-93)   SpO2:  [95 %-98 %] .   Home meds for hypertension were reviewed and noted below.   Hypertension Medications               losartan (COZAAR) 50 MG tablet Take 1 tablet (50 mg total) by mouth once daily.            While in the hospital, will manage blood pressure as follows; Continue home antihypertensive regimen      VTE Risk Mitigation (From admission, onward)           Ordered     IP VTE LOW RISK PATIENT  Once         12/13/23 1239     Place sequential compression device  Until discontinued         12/13/23 1239     enoxaparin injection 40 mg  Once         12/12/23 2322     Place sequential compression device  Until discontinued         12/12/23 2322                    Discharge Planning   RAVI: 12/16/2023     Code Status: Full Code   Is the patient medically ready for discharge?:     Reason for patient still in hospital (select all that apply): Treatment  Discharge Plan A: Home with family                  Ankit Bueno MD  Department of Hospital Medicine   [facility]

## 2023-12-14 NOTE — PT/OT/SLP EVAL
Occupational Therapy   Evaluation    Name: May Solomon  MRN: [id number]  Admitting Diagnosis: Closed left hip fracture  Recent Surgery: Procedure(s) (LRB):  ARTHROPLASTY, HIP REPLACEMENT (Left) 1 Day Post-Op    Recommendations:     Discharge Recommendations: High Intensity Therapy  Discharge Equipment Recommendations:  walker, rolling  Barriers to discharge:  Decreased caregiver support; increased assistance needed for transfers and ADLs     Assessment:     May Solomon is a 73 y.o. female with a medical diagnosis of Closed left hip fracture. Performance deficits affecting function: weakness, impaired endurance, impaired self care skills, impaired functional mobility, gait instability, orthopedic precautions, pain.      Rehab Prognosis: Good; patient would benefit from acute skilled OT services to address these deficits and reach maximum level of function.       Plan:     Patient to be seen 5 x/week to address the above listed problems via self-care/home management, therapeutic exercises, therapeutic groups  Plan of Care Expires: 01/14/24  Plan of Care Reviewed with: patient    Subjective     Chief Complaint: right hip pain (non surgical extremity)  Patient/Family Comments/goals: return home at some point     Occupational Profile:  Living Environment: lives with son in multi level home   Previous level of function: independent with self care; driving   Equipment Used at Home: cane, straight  Assistance upon Discharge: son is available for assistance but works during the day    Pain/Comfort:  Location - Side 1: Right (non surgical side)  Location 1: hip  Pain Addressed 1: Pre-medicate for activity, Nurse notified, Cessation of Activity    Patients cultural, spiritual, Restorationist conflicts given the current situation: no    Objective:     Communicated with: nurse Hale prior to session.  Patient found HOB elevated with peripheral IV, pérez catheter upon OT entry to room.    General Precautions:  Standard, fall  Orthopedic Precautions: LLE weight bearing as tolerated  Braces: N/A  Respiratory Status: Room air    Occupational Performance:    Bed Mobility:    Patient completed Rolling/Turning to Left with  moderate assistance  Patient completed Rolling/Turning to Right with moderate assistance  Patient completed Scooting/Bridging with moderate assistance  Patient completed Supine to Sit with moderate assistance  Patient reported pain and nurse was notified    Activities of Daily Living:  Feeding:  stand by assistance/setup self feeding   Grooming: stand by assistance/setup for washing face with HOB elevated     Cognitive/Visual Perceptual:  Cognitive/Psychosocial Skills:     -       Oriented to: Person, Place, Time, and Situation   -       Follows Commands/attention:Follows multistep  commands  -       Communication: clear/fluent  -       Memory: No Deficits noted  -       Safety awareness/insight to disability: intact   -       Mood/Affect/Coping skills/emotional control: Appropriate to situation and Cooperative    Physical Exam:  Upper Extremity Range of Motion:     -       Right Upper Extremity: WNL  -       Left Upper Extremity: WNL  Upper Extremity Strength:    -       Right Upper Extremity: WNL  -       Left Upper Extremity: WNL   Strength:    -       Right Upper Extremity: WNL  -       Left Upper Extremity: WNL  Fine Motor Coordination:    -       Intact    AMPAC 6 Click ADL:  AMPAC Total Score: 19    Treatment & Education:  Patient was educated on role of OT/POC, hip precautions during transfers and ADLs, equipment needs and discharge planning and reviewed use of call bell for assistance.     Patient left HOB elevated with all lines intact, call button in reach, bed alarm on, and nurse present    GOALS:   Multidisciplinary Problems       Occupational Therapy Goals          Problem: Occupational Therapy    Goal Priority Disciplines Outcome Interventions   Occupational Therapy Goal     OT, PT/OT      Description: Goals to be met by: 1/14/2023     Patient will increase functional independence with ADLs by performing:    UE Dressing with Supervision.  LE Dressing with Supervision.  Grooming while standing with Supervision.  Toileting from toilet with Supervision for hygiene and clothing management.   Bathing from  tub bench with Supervision.                         History:     Past Medical History:   Diagnosis Date    Basal cell carcinoma     Diabetes mellitus, type 2     History of colonic polyps     HTN (hypertension)     Hyperlipidemia          Past Surgical History:   Procedure Laterality Date    ARTHROSCOPY OF KNEE Right 2013    BREAST BIOPSY      COLONOSCOPY  05/2017    EYE SURGERY Right 05/2021    CATARACT    HIP REPLACEMENT ARTHROPLASTY Left 12/13/2023    Procedure: ARTHROPLASTY, HIP REPLACEMENT;  Surgeon: Sachin Tong MD;  Location: [facility];  Service: Orthopedics;  Laterality: Left;    KNEE ARTHROSCOPY W/ MENISCECTOMY Left 9/13/2023    Procedure: ARTHROSCOPY, KNEE, WITH MENISCECTOMY;  Surgeon: Sachin Tong MD;  Location: [facility];  Service: Orthopedics;  Laterality: Left;    TRIGGER FINGER RELEASE Left 10/26/2022    Procedure: RELEASE, TRIGGER FINGER;  Surgeon: Sachin Tong MD;  Location: [facility];  Service: Orthopedics;  Laterality: Left;       Time Tracking:     OT Date of Treatment: 12/14/23  OT Start Time: 0910  OT Stop Time: 0930  OT Total Time (min): 20 min    Billable Minutes:Evaluation 10  Self Care/Home Management 10    12/14/2023

## 2023-12-14 NOTE — ASSESSMENT & PLAN NOTE
"Patient's FSGs will be monitored.  Last A1c reviewed-   Lab Results   Component Value Date    HGBA1C 6.3 (H) 04/17/2023     Most recent fingerstick glucose reviewed-   No results for input(s): "POCTGLUCOSE" in the last 24 hours.    Will use low dose sliding scale.  Do not resume any oral DM meds while pt admitted.  "

## 2023-12-14 NOTE — PLAN OF CARE
12/14/23 1503   Discharge Reassessment   Assessment Type Discharge Planning Reassessment   Did the patient's condition or plan change since previous assessment? Yes   Discharge Plan discussed with: Patient   Discharge Plan A Skilled Nursing Facility   Discharge Plan B Skilled Nursing Facility   DME Needed Upon Discharge  none   Transition of Care Barriers None   Why the patient remains in the hospital Requires continued medical care   Post-Acute Status   Post-Acute Authorization Placement   Post-Acute Placement Status Referrals Sent   Patient choice form signed by patient/caregiver List from [employer]   Discharge Delays None known at this time     Spoke to pt about the need for SNF, pt in agreement referral sent in [facility] and pt to pick for accepting facility.  Cm to follow for completion.

## 2023-12-15 LAB
GLUCOSE SERPL-MCNC: 127 MG/DL (ref 70–110)
GLUCOSE SERPL-MCNC: 143 MG/DL (ref 70–110)
GLUCOSE SERPL-MCNC: 143 MG/DL (ref 70–110)
GLUCOSE SERPL-MCNC: 150 MG/DL (ref 70–110)

## 2023-12-15 PROCEDURE — 99900035 HC TECH TIME PER 15 MIN (STAT)

## 2023-12-15 PROCEDURE — 97116 GAIT TRAINING THERAPY: CPT | Mod: CQ

## 2023-12-15 PROCEDURE — 94760 N-INVAS EAR/PLS OXIMETRY 1: CPT

## 2023-12-15 PROCEDURE — 12000002 HC ACUTE/MED SURGE SEMI-PRIVATE ROOM

## 2023-12-15 PROCEDURE — 63600175 PHARM REV CODE 636 W HCPCS: Performed by: ORTHOPAEDIC SURGERY

## 2023-12-15 PROCEDURE — 99900031 HC PATIENT EDUCATION (STAT)

## 2023-12-15 PROCEDURE — 94799 UNLISTED PULMONARY SVC/PX: CPT

## 2023-12-15 PROCEDURE — 97530 THERAPEUTIC ACTIVITIES: CPT | Mod: CQ

## 2023-12-15 PROCEDURE — 94761 N-INVAS EAR/PLS OXIMETRY MLT: CPT

## 2023-12-15 PROCEDURE — 25000003 PHARM REV CODE 250: Performed by: ORTHOPAEDIC SURGERY

## 2023-12-15 RX ADMIN — MUPIROCIN 1 G: 20 OINTMENT TOPICAL at 09:12

## 2023-12-15 RX ADMIN — HYDROCODONE BITARTRATE AND ACETAMINOPHEN 1 TABLET: 5; 325 TABLET ORAL at 02:12

## 2023-12-15 RX ADMIN — ASPIRIN 81 MG 81 MG: 81 TABLET ORAL at 08:12

## 2023-12-15 RX ADMIN — BISACODYL 10 MG: 10 SUPPOSITORY RECTAL at 09:12

## 2023-12-15 RX ADMIN — ONDANSETRON 4 MG: 2 INJECTION INTRAMUSCULAR; INTRAVENOUS at 11:12

## 2023-12-15 RX ADMIN — ASPIRIN 81 MG 81 MG: 81 TABLET ORAL at 09:12

## 2023-12-15 RX ADMIN — HYDROCODONE BITARTRATE AND ACETAMINOPHEN 1 TABLET: 5; 325 TABLET ORAL at 01:12

## 2023-12-15 RX ADMIN — HYDROCODONE BITARTRATE AND ACETAMINOPHEN 1 TABLET: 5; 325 TABLET ORAL at 07:12

## 2023-12-15 RX ADMIN — LOSARTAN POTASSIUM 50 MG: 50 TABLET, FILM COATED ORAL at 09:12

## 2023-12-15 RX ADMIN — HYDROCODONE BITARTRATE AND ACETAMINOPHEN 1 TABLET: 5; 325 TABLET ORAL at 09:12

## 2023-12-15 RX ADMIN — MUPIROCIN 1 G: 20 OINTMENT TOPICAL at 08:12

## 2023-12-15 NOTE — PT/OT/SLP PROGRESS
Physical Therapy Treatment    Patient Name:  May Solomon   MRN:  [id number]    Recommendations:     Discharge Recommendations: High Intensity Therapy  Discharge Equipment Recommendations:  (TBD at next level of care)  Barriers to discharge:  weakness, impaired self care skills, impaired functional mobility, impaired balance, decreased activity tolerance, pain, edema, orthopedic precautions.     Assessment:     May Solomon is a 73 y.o. female admitted with a medical diagnosis of Closed left hip fracture.  She presents with the following impairments/functional limitations: weakness, impaired endurance, impaired self care skills, impaired functional mobility, gait instability, impaired balance, decreased lower extremity function, decreased safety awareness, pain, edema, impaired cardiopulmonary response to activity, orthopedic precautions.    Pt found resting with HOB elevated, agreeable to skilled physical therapy session. modA required to transfer from supine to sitting to maintain hip precautions. She had no complaints once sitting EOB and performed sit to stand with CGA. She ambulated 20' with RW and CGA. Pt sat up in bed side chair with chair alarm on, call light within reach, all needs met, and RN notified.     Rehab Prognosis: Fair; patient would benefit from acute skilled PT services to address these deficits and reach maximum level of function.    Recent Surgery: Procedure(s) (LRB):  ARTHROPLASTY, HIP REPLACEMENT (Left) 2 Days Post-Op    Plan:     During this hospitalization, patient to be seen daily to address the identified rehab impairments via gait training, therapeutic activities, therapeutic exercises, neuromuscular re-education and progress toward the following goals:    Plan of Care Expires:  01/14/24    Subjective     Chief Complaint: L hip discomfort  Patient/Family Comments/goals: to get better and go home  Pain/Comfort:  Location - Side 1: Left  Location 1: hip      Objective:      Communicated with RN prior to session.  Patient found HOB elevated with bed alarm, pérez catheter, hip abduction pillow, peripheral IV upon PT entry to room.     General Precautions: Standard, fall  Orthopedic Precautions: LLE weight bearing as tolerated, LLE posterior precautions  Braces: N/A  Respiratory Status: Room air     Functional Mobility:  Bed Mobility:     Supine to Sit: moderate assistance  Transfers:     Sit to Stand:  contact guard assistance with rolling walker  Gait: 20' RW and CGA.       AM-PAC 6 CLICK MOBILITY          Treatment & Education:  Pt educated on importance of time OOB, importance of intermittent mobility, safe techniques for transfers/ambulation, discharge recommendations/options, and use of call light for assistance and fall prevention.      Patient left up in chair with all lines intact, call button in reach, chair alarm on, and RN notified..    GOALS:   Multidisciplinary Problems       Physical Therapy Goals          Problem: Physical Therapy    Goal Priority Disciplines Outcome Goal Variances Interventions   Physical Therapy Goal     PT, PT/OT      Description: Goals to be met by: 24     Patient will increase functional independence with mobility by performin. Supine to sit with CGA maintaining VI precautions  2. Sit to stand transfer with Supervision, WBAT L LE  3. Bed to chair transfer with Supervision using Rolling Walker, maintaining VI precautions  4. Gait  x 150 feet with Supervision using Rolling Walker, WBAT L LE.                              Time Tracking:     PT Received On: 12/15/23  PT Start Time: 1054     PT Stop Time: 1117  PT Total Time (min): 23 min     Billable Minutes: Gait Training 8 and Therapeutic Activity 15    Treatment Type: Treatment  PT/PTA: PTA     Number of PTA visits since last PT visit: 1     12/15/2023

## 2023-12-15 NOTE — PROGRESS NOTES
"[facility]  Orthopedics  Progress Note    Patient Name: May Solomon  MRN: [id number]  Admission Date: 12/12/2023  Hospital Length of Stay: 3 days  Attending Provider: Ankit Bueno MD  Primary Care Provider: Irineo Freeman Jr., MD  Follow-up For: Procedure(s) (LRB):  ARTHROPLASTY, HIP REPLACEMENT (Left)    Post-Operative Day: 2 Days Post-Op  Subjective:     Principal Problem:Closed left hip fracture    Principal Orthopedic Problem: L hip Fx     Interval History: S/P L VI     Review of patient's allergies indicates:   Allergen Reactions    Sulfa (sulfonamide antibiotics) Rash       Current Facility-Administered Medications   Medication    aspirin chewable tablet 81 mg    bisacodyL suppository 10 mg    dextrose 10% bolus 125 mL 125 mL    dextrose 10% bolus 250 mL 250 mL    enoxaparin injection 40 mg    glucagon (human recombinant) injection 1 mg    glucose chewable tablet 16 g    glucose chewable tablet 24 g    HYDROcodone-acetaminophen 5-325 mg per tablet 1 tablet    insulin aspart U-100 pen 0-5 Units    losartan tablet 50 mg    morphine injection 6 mg    mupirocin 2 % ointment 1 g    naloxone 0.4 mg/mL injection 0.02 mg    ondansetron injection 4 mg    sodium chloride 0.9% flush 10 mL    sodium chloride 0.9% flush 5 mL    zolpidem tablet 5 mg     Objective:     Vital Signs (Most Recent):  Temp: 98.4 °F (36.9 °C) (12/15/23 0423)  Pulse: 90 (12/15/23 0423)  Resp: 14 (12/15/23 0423)  BP: (!) 149/67 (12/15/23 0448)  SpO2: 95 % (12/15/23 0423) Vital Signs (24h Range):  Temp:  [97.2 °F (36.2 °C)-98.4 °F (36.9 °C)] 98.4 °F (36.9 °C)  Pulse:  [] 90  Resp:  [14-18] 14  SpO2:  [95 %-99 %] 95 %  BP: (139-183)/(62-84) 149/67     Weight: 77.1 kg (170 lb)  Height: 5' 3" (160 cm)  Body mass index is 30.11 kg/m².      Intake/Output Summary (Last 24 hours) at 12/15/2023 0623  Last data filed at 12/15/2023 0449  Gross per 24 hour   Intake 770 ml   Output 800 ml   Net -30 ml      " "  General    Nursing note and vitals reviewed.  Constitutional: She is oriented to person, place, and time. She appears well-developed and well-nourished. No distress.   Neurological: She is alert and oriented to person, place, and time.   Psychiatric: She has a normal mood and affect. Her behavior is normal. Thought content normal.         Left Hip Exam     Other   Sensation: normal    Comments:  DSG C/D/I, DNVI          Vascular Exam       Left Pulses  Dorsalis Pedis:      2+  Posterior Tibial:      2+        Capillary Refill  Left Hip: <3 sec         Significant Labs: BMP:   Recent Labs   Lab 12/14/23  0547   *      K 4.1      CO2 30*   BUN 24*   CREATININE 0.9   CALCIUM 8.7     CBC:   Recent Labs   Lab 12/14/23  0547   WBC 7.61   HGB 10.0*   HCT 30.6*        CMP:   Recent Labs   Lab 12/14/23  0547      K 4.1      CO2 30*   *   BUN 24*   CREATININE 0.9   CALCIUM 8.7   PROT 6.0   ALBUMIN 3.7   BILITOT 0.6   ALKPHOS 40*   AST 34   ALT 18   ANIONGAP 4*     Coagulation: No results for input(s): "LABPROT", "INR", "APTT" in the last 48 hours.  Urine Culture: No results for input(s): "LABURIN" in the last 48 hours.  Urine Studies: No results for input(s): "COLORU", "APPEARANCEUA", "PHUR", "SPECGRAV", "PROTEINUA", "GLUCUA", "KETONESU", "BILIRUBINUA", "OCCULTUA", "NITRITE", "UROBILINOGEN", "LEUKOCYTESUR", "RBCUA", "WBCUA", "BACTERIA", "SQUAMEPITHEL", "HYALINECASTS" in the last 48 hours.    Invalid input(s): "WRIGHTSUR"  All pertinent labs within the past 24 hours have been reviewed.    Significant Imaging: None  Assessment/Plan:     Status post left hip replacement  POD#2    OOB with PT and nursing  LLE WBAT  Change dressing qd  DC planning          DOUG Osman  Orthopedics  [facility]    "

## 2023-12-15 NOTE — PT/OT/SLP PROGRESS
Occupational Therapy      Patient Name:  May Solomon   MRN:  [id number]    Patient not seen today secondary to Patient unwilling to participate (just received pain medication). Will follow-up next scheduled service date.    12/15/2023

## 2023-12-15 NOTE — RESPIRATORY THERAPY
12/14/23 2048   Patient Assessment/Suction   Level of Consciousness (AVPU) alert   Respiratory Effort Normal;Unlabored   Expansion/Accessory Muscles/Retractions no use of accessory muscles;no retractions   All Lung Fields Breath Sounds Anterior:;clear;equal bilaterally   Rhythm/Pattern, Respiratory unlabored;pattern regular;depth regular   Cough Frequency no cough   PRE-TX-O2   Device (Oxygen Therapy) room air   SpO2 95 %   Pulse Oximetry Type Intermittent   $ Pulse Oximetry - Multiple Charge Pulse Oximetry - Multiple   Pulse 101   Resp 17   Education   $ Education 15 min;Other (see comment)  (O2 CHECK)   Respiratory Evaluation   $ Care Plan Tech Time 15 min   $ Eval/Re-eval Charges Evaluation   Evaluation For New Orders

## 2023-12-15 NOTE — PLAN OF CARE
12/15/23 0750   Post-Acute Status   Post-Acute Authorization Placement   Post-Acute Placement Status Pending payor review/awaiting authorization (if required)     [facility] pending [employer] auth submitted 12/14  4:45pm.

## 2023-12-15 NOTE — SUBJECTIVE & OBJECTIVE
"Principal Problem:Closed left hip fracture    Principal Orthopedic Problem: L hip Fx     Interval History: S/P L VI     Review of patient's allergies indicates:   Allergen Reactions    Sulfa (sulfonamide antibiotics) Rash       Current Facility-Administered Medications   Medication    aspirin chewable tablet 81 mg    bisacodyL suppository 10 mg    dextrose 10% bolus 125 mL 125 mL    dextrose 10% bolus 250 mL 250 mL    enoxaparin injection 40 mg    glucagon (human recombinant) injection 1 mg    glucose chewable tablet 16 g    glucose chewable tablet 24 g    HYDROcodone-acetaminophen 5-325 mg per tablet 1 tablet    insulin aspart U-100 pen 0-5 Units    losartan tablet 50 mg    morphine injection 6 mg    mupirocin 2 % ointment 1 g    naloxone 0.4 mg/mL injection 0.02 mg    ondansetron injection 4 mg    sodium chloride 0.9% flush 10 mL    sodium chloride 0.9% flush 5 mL    zolpidem tablet 5 mg     Objective:     Vital Signs (Most Recent):  Temp: 98.4 °F (36.9 °C) (12/15/23 0423)  Pulse: 90 (12/15/23 0423)  Resp: 14 (12/15/23 0423)  BP: (!) 149/67 (12/15/23 0448)  SpO2: 95 % (12/15/23 0423) Vital Signs (24h Range):  Temp:  [97.2 °F (36.2 °C)-98.4 °F (36.9 °C)] 98.4 °F (36.9 °C)  Pulse:  [] 90  Resp:  [14-18] 14  SpO2:  [95 %-99 %] 95 %  BP: (139-183)/(62-84) 149/67     Weight: 77.1 kg (170 lb)  Height: 5' 3" (160 cm)  Body mass index is 30.11 kg/m².      Intake/Output Summary (Last 24 hours) at 12/15/2023 0623  Last data filed at 12/15/2023 0449  Gross per 24 hour   Intake 770 ml   Output 800 ml   Net -30 ml        General    Nursing note and vitals reviewed.  Constitutional: She is oriented to person, place, and time. She appears well-developed and well-nourished. No distress.   Neurological: She is alert and oriented to person, place, and time.   Psychiatric: She has a normal mood and affect. Her behavior is normal. Thought content normal.         Left Hip Exam     Other   Sensation: normal    Comments:  DSG " "C/D/I, DNVI          Vascular Exam       Left Pulses  Dorsalis Pedis:      2+  Posterior Tibial:      2+        Capillary Refill  Left Hip: <3 sec         Significant Labs: BMP:   Recent Labs   Lab 12/14/23  0547   *      K 4.1      CO2 30*   BUN 24*   CREATININE 0.9   CALCIUM 8.7     CBC:   Recent Labs   Lab 12/14/23  0547   WBC 7.61   HGB 10.0*   HCT 30.6*        CMP:   Recent Labs   Lab 12/14/23  0547      K 4.1      CO2 30*   *   BUN 24*   CREATININE 0.9   CALCIUM 8.7   PROT 6.0   ALBUMIN 3.7   BILITOT 0.6   ALKPHOS 40*   AST 34   ALT 18   ANIONGAP 4*     Coagulation: No results for input(s): "LABPROT", "INR", "APTT" in the last 48 hours.  Urine Culture: No results for input(s): "LABURIN" in the last 48 hours.  Urine Studies: No results for input(s): "COLORU", "APPEARANCEUA", "PHUR", "SPECGRAV", "PROTEINUA", "GLUCUA", "KETONESU", "BILIRUBINUA", "OCCULTUA", "NITRITE", "UROBILINOGEN", "LEUKOCYTESUR", "RBCUA", "WBCUA", "BACTERIA", "SQUAMEPITHEL", "HYALINECASTS" in the last 48 hours.    Invalid input(s): "WRIGHTSUR"  All pertinent labs within the past 24 hours have been reviewed.    Significant Imaging: None  "

## 2023-12-16 LAB
ANION GAP SERPL CALC-SCNC: 8 MMOL/L (ref 8–16)
BUN SERPL-MCNC: 27 MG/DL (ref 8–23)
CALCIUM SERPL-MCNC: 9.5 MG/DL (ref 8.7–10.5)
CHLORIDE SERPL-SCNC: 100 MMOL/L (ref 95–110)
CO2 SERPL-SCNC: 28 MMOL/L (ref 23–29)
CREAT SERPL-MCNC: 0.8 MG/DL (ref 0.5–1.4)
ERYTHROCYTE [DISTWIDTH] IN BLOOD BY AUTOMATED COUNT: 13.2 % (ref 11.5–14.5)
EST. GFR  (NO RACE VARIABLE): >60 ML/MIN/1.73 M^2
GLUCOSE SERPL-MCNC: 120 MG/DL (ref 70–110)
GLUCOSE SERPL-MCNC: 134 MG/DL (ref 70–110)
GLUCOSE SERPL-MCNC: 154 MG/DL (ref 70–110)
GLUCOSE SERPL-MCNC: 183 MG/DL (ref 70–110)
HCT VFR BLD AUTO: 30.6 % (ref 37–48.5)
HGB BLD-MCNC: 10.1 G/DL (ref 12–16)
MCH RBC QN AUTO: 29.7 PG (ref 27–31)
MCHC RBC AUTO-ENTMCNC: 33 G/DL (ref 32–36)
MCV RBC AUTO: 90 FL (ref 82–98)
PLATELET # BLD AUTO: 214 K/UL (ref 150–450)
PMV BLD AUTO: 10.9 FL (ref 9.2–12.9)
POTASSIUM SERPL-SCNC: 3.9 MMOL/L (ref 3.5–5.1)
RBC # BLD AUTO: 3.4 M/UL (ref 4–5.4)
SODIUM SERPL-SCNC: 136 MMOL/L (ref 136–145)
TB INDURATION 48 - 72 HR READ: 0 MM
WBC # BLD AUTO: 7.36 K/UL (ref 3.9–12.7)

## 2023-12-16 PROCEDURE — 82962 GLUCOSE BLOOD TEST: CPT

## 2023-12-16 PROCEDURE — 97116 GAIT TRAINING THERAPY: CPT

## 2023-12-16 PROCEDURE — 12000002 HC ACUTE/MED SURGE SEMI-PRIVATE ROOM

## 2023-12-16 PROCEDURE — 85027 COMPLETE CBC AUTOMATED: CPT | Performed by: STUDENT IN AN ORGANIZED HEALTH CARE EDUCATION/TRAINING PROGRAM

## 2023-12-16 PROCEDURE — 80048 BASIC METABOLIC PNL TOTAL CA: CPT | Performed by: STUDENT IN AN ORGANIZED HEALTH CARE EDUCATION/TRAINING PROGRAM

## 2023-12-16 PROCEDURE — 36415 COLL VENOUS BLD VENIPUNCTURE: CPT | Performed by: STUDENT IN AN ORGANIZED HEALTH CARE EDUCATION/TRAINING PROGRAM

## 2023-12-16 PROCEDURE — 25000003 PHARM REV CODE 250: Performed by: ORTHOPAEDIC SURGERY

## 2023-12-16 PROCEDURE — 99900031 HC PATIENT EDUCATION (STAT)

## 2023-12-16 PROCEDURE — 94761 N-INVAS EAR/PLS OXIMETRY MLT: CPT

## 2023-12-16 PROCEDURE — 27000221 HC OXYGEN, UP TO 24 HOURS

## 2023-12-16 PROCEDURE — 97110 THERAPEUTIC EXERCISES: CPT

## 2023-12-16 RX ORDER — LOSARTAN POTASSIUM 50 MG/1
100 TABLET ORAL DAILY
Status: DISCONTINUED | OUTPATIENT
Start: 2023-12-17 | End: 2023-12-18 | Stop reason: HOSPADM

## 2023-12-16 RX ADMIN — HYDROCODONE BITARTRATE AND ACETAMINOPHEN 1 TABLET: 5; 325 TABLET ORAL at 07:12

## 2023-12-16 RX ADMIN — MUPIROCIN 1 G: 20 OINTMENT TOPICAL at 08:12

## 2023-12-16 RX ADMIN — HYDROCODONE BITARTRATE AND ACETAMINOPHEN 1 TABLET: 5; 325 TABLET ORAL at 01:12

## 2023-12-16 RX ADMIN — MUPIROCIN 1 G: 20 OINTMENT TOPICAL at 09:12

## 2023-12-16 RX ADMIN — HYDROCODONE BITARTRATE AND ACETAMINOPHEN 1 TABLET: 5; 325 TABLET ORAL at 12:12

## 2023-12-16 RX ADMIN — LOSARTAN POTASSIUM 50 MG: 50 TABLET, FILM COATED ORAL at 09:12

## 2023-12-16 RX ADMIN — ASPIRIN 81 MG 81 MG: 81 TABLET ORAL at 08:12

## 2023-12-16 RX ADMIN — ASPIRIN 81 MG 81 MG: 81 TABLET ORAL at 09:12

## 2023-12-16 RX ADMIN — ZOLPIDEM TARTRATE 5 MG: 5 TABLET, COATED ORAL at 11:12

## 2023-12-16 NOTE — PLAN OF CARE
Pt AAOx 4. Rested well this shift. Pain managed with PRN medication. No acute events overnight. No signs, symptoms, or complaints of distress at this time. Care ongoing.    Problem: Infection  Goal: Absence of Infection Signs and Symptoms  Outcome: Ongoing, Progressing     Problem: Adult Inpatient Plan of Care  Goal: Plan of Care Review  Outcome: Ongoing, Progressing  Goal: Patient-Specific Goal (Individualized)  Outcome: Ongoing, Progressing  Goal: Absence of Hospital-Acquired Illness or Injury  Outcome: Ongoing, Progressing  Goal: Optimal Comfort and Wellbeing  Outcome: Ongoing, Progressing  Goal: Readiness for Transition of Care  Outcome: Ongoing, Progressing     Problem: Fall Injury Risk  Goal: Absence of Fall and Fall-Related Injury  Outcome: Ongoing, Progressing     Problem: Skin Injury Risk Increased  Goal: Skin Health and Integrity  Outcome: Ongoing, Progressing     Problem: Diabetes Comorbidity  Goal: Blood Glucose Level Within Targeted Range  Outcome: Ongoing, Progressing     Problem: Impaired Wound Healing  Goal: Optimal Wound Healing  Outcome: Ongoing, Progressing

## 2023-12-16 NOTE — RESPIRATORY THERAPY
12/15/23 2019   Patient Assessment/Suction   Level of Consciousness (AVPU) alert   Respiratory Effort Unlabored   Expansion/Accessory Muscles/Retractions no use of accessory muscles;no retractions;expansion symmetric   All Lung Fields Breath Sounds Anterior:;clear;equal bilaterally   Rhythm/Pattern, Respiratory unlabored;pattern regular;depth regular   Cough Frequency no cough   PRE-TX-O2   Device (Oxygen Therapy) room air   SpO2 (!) 94 %   Pulse Oximetry Type Intermittent   $ Pulse Oximetry - Multiple Charge Pulse Oximetry - Multiple   Pulse 106   Resp 17   Positioning HOB elevated 30 degrees   Education   $ Education 15 min;Other (see comment)  (O2 CHECK)   Respiratory Evaluation   $ Care Plan Tech Time 15 min   $ Eval/Re-eval Charges Re-evaluation

## 2023-12-16 NOTE — PROGRESS NOTES
[facility] Medicine  Progress Note    Patient Name: May Solomon  MRN: [id number]  Patient Class: IP- Inpatient   Admission Date: 12/12/2023  Length of Stay: 4 days  Attending Physician: Ankit Bueno MD  Primary Care Provider: Irineo Freeman Jr., MD        Subjective:     Principal Problem:Closed left hip fracture        HPI:  Ms. Solomon is a 73 year old woman with PMHx of HTN, DM2- presented with hip pain after a fall.  Imaging shows proximal femur fracture.  The fall occurred by accident when patient tripped over a trailer hitch on her vehicle. She recently had knee arthroscopy and meniscectomy.  No issues with the surgery.  Dr. Tong's her orthopedist.     Pt is NPO for surgery tomorrow.    Overview/Hospital Course:  No notes on file    Interval History:  Feeling well today.  Plans to get up with rehab later.  Plans to go to skilled rehab upon discharge    Review of Systems   All other systems reviewed and are negative.    Objective:     Vital Signs (Most Recent):  Temp: 98.4 °F (36.9 °C) (12/16/23 0749)  Pulse: 94 (12/16/23 0749)  Resp: 18 (12/16/23 0751)  BP: (!) 163/72 (notified nurse arpita) (12/16/23 0749)  SpO2: 95 % (12/16/23 0749) Vital Signs (24h Range):  Temp:  [97.8 °F (36.6 °C)-98.5 °F (36.9 °C)] 98.4 °F (36.9 °C)  Pulse:  [] 94  Resp:  [16-18] 18  SpO2:  [94 %-97 %] 95 %  BP: (112-167)/(71-82) 163/72     Weight: 77.1 kg (170 lb)  Body mass index is 30.11 kg/m².    Intake/Output Summary (Last 24 hours) at 12/16/2023 1058  Last data filed at 12/15/2023 2012  Gross per 24 hour   Intake 360 ml   Output 400 ml   Net -40 ml           Physical Exam  Constitutional:       Appearance: Normal appearance. She is normal weight.   HENT:      Head: Normocephalic and atraumatic.      Nose: Nose normal.      Mouth/Throat:      Mouth: Mucous membranes are moist.   Eyes:      Conjunctiva/sclera: Conjunctivae normal.      Pupils: Pupils are equal, round, and  "reactive to light.   Cardiovascular:      Rate and Rhythm: Normal rate and regular rhythm.      Pulses: Normal pulses.      Heart sounds: Normal heart sounds. No murmur heard.     No friction rub. No gallop.   Pulmonary:      Effort: Pulmonary effort is normal.      Breath sounds: Normal breath sounds. No wheezing or rales.   Abdominal:      General: Abdomen is flat. Bowel sounds are normal. There is no distension.      Palpations: Abdomen is soft.      Tenderness: There is no abdominal tenderness. There is no guarding.   Musculoskeletal:         General: No swelling. Normal range of motion.      Cervical back: Normal range of motion and neck supple.      Comments: Status post left hip fracture repair.  Neurovascularly intact distally.   Skin:     General: Skin is warm and dry.   Neurological:      General: No focal deficit present.      Mental Status: She is alert.   Psychiatric:         Mood and Affect: Mood normal.         Thought Content: Thought content normal.         Judgment: Judgment normal.             Significant Labs: All pertinent labs within the past 24 hours have been reviewed.    Significant Imaging: I have reviewed all pertinent imaging results/findings within the past 24 hours.    Assessment/Plan:      * Closed left hip fracture  Bedrest.  Admit to med surg.  Postop day 3 from hip fracture repair  Continue pain control  Weight-bearing as tolerated  Continue PT and OT  DVT prophylaxis with 81 mg ASA b.i.d..  Will need placement on Monday      Status post left hip replacement  Doing well postoperatively, she was very weak and will need placement for rehab prior to returning home.      Diabetes mellitus, type 2  Patient's FSGs will be monitored.  Last A1c reviewed-   Lab Results   Component Value Date    HGBA1C 6.3 (H) 04/17/2023     Most recent fingerstick glucose reviewed-   No results for input(s): "POCTGLUCOSE" in the last 24 hours.    Will use low dose sliding scale.  Do not resume any oral DM " meds while pt admitted.    HTN (hypertension)  Chronic, controlled. Latest blood pressure and vitals reviewed-     Temp:  [98 °F (36.7 °C)-98.5 °F (36.9 °C)]   Pulse:  [70-95]   Resp:  [16-20]   BP: (103-178)/(51-93)   SpO2:  [95 %-98 %] .   Home meds for hypertension were reviewed and noted below.   Hypertension Medications               losartan (COZAAR) 50 MG tablet Take 1 tablet (50 mg total) by mouth once daily.            While in the hospital, will manage blood pressure as follows; Continue home antihypertensive regimen      VTE Risk Mitigation (From admission, onward)           Ordered     IP VTE LOW RISK PATIENT  Once         12/13/23 1239     Place sequential compression device  Until discontinued         12/13/23 1239     enoxaparin injection 40 mg  Once         12/12/23 2322     Place sequential compression device  Until discontinued         12/12/23 2322                    Discharge Planning   RAVI: 12/18/2023     Code Status: Full Code   Is the patient medically ready for discharge?:     Reason for patient still in hospital (select all that apply): Treatment  Discharge Plan A: Skilled Nursing Facility   Discharge Delays: None known at this time              Ankit Bueno MD  Department of Hospital Medicine   [facility]

## 2023-12-16 NOTE — ASSESSMENT & PLAN NOTE
POD#3    OOB with PT and nursing  LLE WBAT  Change dressing qd  DC planning - Rehab    Stable from an ortho stand point

## 2023-12-16 NOTE — SUBJECTIVE & OBJECTIVE
"Principal Problem:Closed left hip fracture    Principal Orthopedic Problem: S/P L VI 2/2 Fx    Interval History: Rehab consult    Review of patient's allergies indicates:   Allergen Reactions    Sulfa (sulfonamide antibiotics) Rash       Current Facility-Administered Medications   Medication    aspirin chewable tablet 81 mg    dextrose 10% bolus 125 mL 125 mL    dextrose 10% bolus 250 mL 250 mL    enoxaparin injection 40 mg    glucagon (human recombinant) injection 1 mg    glucose chewable tablet 16 g    glucose chewable tablet 24 g    HYDROcodone-acetaminophen 5-325 mg per tablet 1 tablet    insulin aspart U-100 pen 0-5 Units    losartan tablet 50 mg    morphine injection 6 mg    mupirocin 2 % ointment 1 g    naloxone 0.4 mg/mL injection 0.02 mg    ondansetron injection 4 mg    sodium chloride 0.9% flush 10 mL    sodium chloride 0.9% flush 5 mL    zolpidem tablet 5 mg     Objective:     Vital Signs (Most Recent):  Temp: 98.4 °F (36.9 °C) (12/16/23 0749)  Pulse: 94 (12/16/23 0749)  Resp: 18 (12/16/23 0751)  BP: (!) 163/72 (notified nurse arpita) (12/16/23 0749)  SpO2: 95 % (12/16/23 0749) Vital Signs (24h Range):  Temp:  [97.8 °F (36.6 °C)-98.5 °F (36.9 °C)] 98.4 °F (36.9 °C)  Pulse:  [] 94  Resp:  [16-18] 18  SpO2:  [94 %-97 %] 95 %  BP: (112-167)/(71-82) 163/72     Weight: 77.1 kg (170 lb)  Height: 5' 3" (160 cm)  Body mass index is 30.11 kg/m².      Intake/Output Summary (Last 24 hours) at 12/16/2023 1050  Last data filed at 12/15/2023 2012  Gross per 24 hour   Intake 360 ml   Output 400 ml   Net -40 ml        General    Nursing note and vitals reviewed.  Constitutional: She is oriented to person, place, and time. She appears well-developed and well-nourished.   Pulmonary/Chest: Effort normal.   Neurological: She is alert and oriented to person, place, and time.   Psychiatric: She has a normal mood and affect. Her behavior is normal.         Left Hip Exam     Comments:  KERRY KRAFT (distal neurovascular " intact). Dressing clean, dry, intact. Sitting up with PT.             Significant Labs: CBC:   Recent Labs   Lab 12/16/23  0922   WBC 7.36   HGB 10.1*   HCT 30.6*        CMP:   Recent Labs   Lab 12/16/23  0922      K 3.9      CO2 28   *   BUN 27*   CREATININE 0.8   CALCIUM 9.5   ANIONGAP 8     All pertinent labs within the past 24 hours have been reviewed.    Significant Imaging: None

## 2023-12-16 NOTE — ASSESSMENT & PLAN NOTE
Bedrest.  Admit to med surg.  Postop day 3 from hip fracture repair  Continue pain control  Weight-bearing as tolerated  Continue PT and OT  DVT prophylaxis with 81 mg ASA b.i.d..  Will need placement on Monday

## 2023-12-16 NOTE — PLAN OF CARE
Problem: Physical Therapy  Goal: Physical Therapy Goal  Description: Goals to be met by: 24     Patient will increase functional independence with mobility by performin. Supine to sit with CGA maintaining VI precautions  2. Sit to stand transfer with Supervision, WBAT L LE  3. Bed to chair transfer with Supervision using Rolling Walker, maintaining VI precautions  4. Gait  x 150 feet with Supervision using Rolling Walker, WBAT L LE.         Outcome: Ongoing, Progressing   Pt reviewed on L posterior hip precautions. Pt ambulated 40ft with RW min assist. OOB chair

## 2023-12-16 NOTE — ASSESSMENT & PLAN NOTE
Bedrest.  Admit to med surg.  Postop day 2 from hip fracture repair  Continue pain control  Weight-bearing as tolerated  Continue PT and OT  DVT prophylaxis with 81 mg ASA b.i.d..  Will need placement on Monday

## 2023-12-16 NOTE — PROGRESS NOTES
"[facility]  Orthopedics  Progress Note    Patient Name: May Solomon  MRN: [id number]  Admission Date: 12/12/2023  Hospital Length of Stay: 4 days  Attending Provider: Ankit Bueno MD  Primary Care Provider: Irineo Freeman Jr., MD  Follow-up For: Procedure(s) (LRB):  ARTHROPLASTY, HIP REPLACEMENT (Left)    Post-Operative Day: 3 Days Post-Op  Subjective:     Principal Problem:Closed left hip fracture    Principal Orthopedic Problem: S/P L VI 2/2 Fx    Interval History: Rehab consult    Review of patient's allergies indicates:   Allergen Reactions    Sulfa (sulfonamide antibiotics) Rash       Current Facility-Administered Medications   Medication    aspirin chewable tablet 81 mg    dextrose 10% bolus 125 mL 125 mL    dextrose 10% bolus 250 mL 250 mL    enoxaparin injection 40 mg    glucagon (human recombinant) injection 1 mg    glucose chewable tablet 16 g    glucose chewable tablet 24 g    HYDROcodone-acetaminophen 5-325 mg per tablet 1 tablet    insulin aspart U-100 pen 0-5 Units    losartan tablet 50 mg    morphine injection 6 mg    mupirocin 2 % ointment 1 g    naloxone 0.4 mg/mL injection 0.02 mg    ondansetron injection 4 mg    sodium chloride 0.9% flush 10 mL    sodium chloride 0.9% flush 5 mL    zolpidem tablet 5 mg     Objective:     Vital Signs (Most Recent):  Temp: 98.4 °F (36.9 °C) (12/16/23 0749)  Pulse: 94 (12/16/23 0749)  Resp: 18 (12/16/23 0751)  BP: (!) 163/72 (notified nurse arpita) (12/16/23 0749)  SpO2: 95 % (12/16/23 0749) Vital Signs (24h Range):  Temp:  [97.8 °F (36.6 °C)-98.5 °F (36.9 °C)] 98.4 °F (36.9 °C)  Pulse:  [] 94  Resp:  [16-18] 18  SpO2:  [94 %-97 %] 95 %  BP: (112-167)/(71-82) 163/72     Weight: 77.1 kg (170 lb)  Height: 5' 3" (160 cm)  Body mass index is 30.11 kg/m².      Intake/Output Summary (Last 24 hours) at 12/16/2023 1050  Last data filed at 12/15/2023 2012  Gross per 24 hour   Intake 360 ml   Output 400 ml   Net -40 ml      "   General    Nursing note and vitals reviewed.  Constitutional: She is oriented to person, place, and time. She appears well-developed and well-nourished.   Pulmonary/Chest: Effort normal.   Neurological: She is alert and oriented to person, place, and time.   Psychiatric: She has a normal mood and affect. Her behavior is normal.         Left Hip Exam     Comments:  LLE DNVI (distal neurovascular intact). Dressing clean, dry, intact. Sitting up with PT.             Significant Labs: CBC:   Recent Labs   Lab 12/16/23  0922   WBC 7.36   HGB 10.1*   HCT 30.6*        CMP:   Recent Labs   Lab 12/16/23  0922      K 3.9      CO2 28   *   BUN 27*   CREATININE 0.8   CALCIUM 9.5   ANIONGAP 8     All pertinent labs within the past 24 hours have been reviewed.    Significant Imaging: None  Assessment/Plan:     Status post left hip replacement  POD#3    OOB with PT and nursing  LLE WBAT  Change dressing qd  DC planning - Rehab    Stable from an ortho stand point          DOUG NASSAR  Orthopedics  [facility]

## 2023-12-16 NOTE — PROGRESS NOTES
[facility] Medicine  Progress Note    Patient Name: May Solomon  MRN: [id number]  Patient Class: IP- Inpatient   Admission Date: 12/12/2023  Length of Stay: 3 days  Attending Physician: Ankit Bueno MD  Primary Care Provider: Irineo Freeman Jr., MD        Subjective:     Principal Problem:Closed left hip fracture        HPI:  Ms. Solomon is a 73 year old woman with PMHx of HTN, DM2- presented with hip pain after a fall.  Imaging shows proximal femur fracture.  The fall occurred by accident when patient tripped over a trailer hitch on her vehicle. She recently had knee arthroscopy and meniscectomy.  No issues with the surgery.  Dr. Tong's her orthopedist.     Pt is NPO for surgery tomorrow.    Overview/Hospital Course:  No notes on file    Interval History:  Doing much better today. Pain is much better. She has been up with PT. No complaints. Plans for rehab    Review of Systems   All other systems reviewed and are negative.    Objective:     Vital Signs (Most Recent):  Temp: 98.1 °F (36.7 °C) (12/15/23 1641)  Pulse: 91 (12/15/23 1641)  Resp: 18 (12/15/23 1641)  BP: (!) 152/82 (12/15/23 1641)  SpO2: 97 % (12/15/23 1641) Vital Signs (24h Range):  Temp:  [97.8 °F (36.6 °C)-98.4 °F (36.9 °C)] 98.1 °F (36.7 °C)  Pulse:  [] 91  Resp:  [14-18] 18  SpO2:  [92 %-97 %] 97 %  BP: (149-183)/(67-84) 152/82     Weight: 77.1 kg (170 lb)  Body mass index is 30.11 kg/m².    Intake/Output Summary (Last 24 hours) at 12/15/2023 1810  Last data filed at 12/15/2023 1753  Gross per 24 hour   Intake 960 ml   Output 1550 ml   Net -590 ml           Physical Exam  Constitutional:       Appearance: Normal appearance. She is normal weight.   HENT:      Head: Normocephalic and atraumatic.      Nose: Nose normal.      Mouth/Throat:      Mouth: Mucous membranes are moist.   Eyes:      Conjunctiva/sclera: Conjunctivae normal.      Pupils: Pupils are equal, round, and reactive to light.  "  Cardiovascular:      Rate and Rhythm: Normal rate and regular rhythm.      Pulses: Normal pulses.      Heart sounds: Normal heart sounds. No murmur heard.     No friction rub. No gallop.   Pulmonary:      Effort: Pulmonary effort is normal.      Breath sounds: Normal breath sounds. No wheezing or rales.   Abdominal:      General: Abdomen is flat. Bowel sounds are normal. There is no distension.      Palpations: Abdomen is soft.      Tenderness: There is no abdominal tenderness. There is no guarding.   Musculoskeletal:         General: No swelling. Normal range of motion.      Cervical back: Normal range of motion and neck supple.      Comments: Status post left hip fracture repair.  Neurovascularly intact distally.   Skin:     General: Skin is warm and dry.   Neurological:      General: No focal deficit present.      Mental Status: She is alert.   Psychiatric:         Mood and Affect: Mood normal.         Thought Content: Thought content normal.         Judgment: Judgment normal.             Significant Labs: All pertinent labs within the past 24 hours have been reviewed.    Significant Imaging: I have reviewed all pertinent imaging results/findings within the past 24 hours.    Assessment/Plan:      * Closed left hip fracture  Bedrest.  Admit to med surg.  Postop day 2 from hip fracture repair  Continue pain control  Weight-bearing as tolerated  Continue PT and OT  DVT prophylaxis with 81 mg ASA b.i.d..  Will need placement on Monday      Status post left hip replacement  Doing well postoperatively, she was very weak and will need placement for rehab prior to returning home.      Diabetes mellitus, type 2  Patient's FSGs will be monitored.  Last A1c reviewed-   Lab Results   Component Value Date    HGBA1C 6.3 (H) 04/17/2023     Most recent fingerstick glucose reviewed-   No results for input(s): "POCTGLUCOSE" in the last 24 hours.    Will use low dose sliding scale.  Do not resume any oral DM meds while pt " admitted.    HTN (hypertension)  Chronic, controlled. Latest blood pressure and vitals reviewed-     Temp:  [98 °F (36.7 °C)-98.5 °F (36.9 °C)]   Pulse:  [70-95]   Resp:  [16-20]   BP: (103-178)/(51-93)   SpO2:  [95 %-98 %] .   Home meds for hypertension were reviewed and noted below.   Hypertension Medications               losartan (COZAAR) 50 MG tablet Take 1 tablet (50 mg total) by mouth once daily.            While in the hospital, will manage blood pressure as follows; Continue home antihypertensive regimen      VTE Risk Mitigation (From admission, onward)           Ordered     IP VTE LOW RISK PATIENT  Once         12/13/23 1239     Place sequential compression device  Until discontinued         12/13/23 1239     enoxaparin injection 40 mg  Once         12/12/23 2322     Place sequential compression device  Until discontinued         12/12/23 2322                    Discharge Planning   RAVI: 12/18/2023     Code Status: Full Code   Is the patient medically ready for discharge?:     Reason for patient still in hospital (select all that apply): Treatment  Discharge Plan A: Skilled Nursing Facility   Discharge Delays: None known at this time              Ankit Bueno MD  Department of Hospital Medicine   [facility]

## 2023-12-16 NOTE — CARE UPDATE
12/16/23 0728   PRE-TX-O2   Device (Oxygen Therapy) room air   SpO2 95 %   Pulse Oximetry Type Intermittent   $ Pulse Oximetry - Multiple Charge Pulse Oximetry - Multiple   Pulse 91   Resp 18   Education   $ Education 15 min

## 2023-12-16 NOTE — SUBJECTIVE & OBJECTIVE
Interval History:  Doing much better today. Pain is much better. She has been up with PT. No complaints. Plans for rehab    Review of Systems   All other systems reviewed and are negative.    Objective:     Vital Signs (Most Recent):  Temp: 98.1 °F (36.7 °C) (12/15/23 1641)  Pulse: 91 (12/15/23 1641)  Resp: 18 (12/15/23 1641)  BP: (!) 152/82 (12/15/23 1641)  SpO2: 97 % (12/15/23 1641) Vital Signs (24h Range):  Temp:  [97.8 °F (36.6 °C)-98.4 °F (36.9 °C)] 98.1 °F (36.7 °C)  Pulse:  [] 91  Resp:  [14-18] 18  SpO2:  [92 %-97 %] 97 %  BP: (149-183)/(67-84) 152/82     Weight: 77.1 kg (170 lb)  Body mass index is 30.11 kg/m².    Intake/Output Summary (Last 24 hours) at 12/15/2023 1810  Last data filed at 12/15/2023 1753  Gross per 24 hour   Intake 960 ml   Output 1550 ml   Net -590 ml           Physical Exam  Constitutional:       Appearance: Normal appearance. She is normal weight.   HENT:      Head: Normocephalic and atraumatic.      Nose: Nose normal.      Mouth/Throat:      Mouth: Mucous membranes are moist.   Eyes:      Conjunctiva/sclera: Conjunctivae normal.      Pupils: Pupils are equal, round, and reactive to light.   Cardiovascular:      Rate and Rhythm: Normal rate and regular rhythm.      Pulses: Normal pulses.      Heart sounds: Normal heart sounds. No murmur heard.     No friction rub. No gallop.   Pulmonary:      Effort: Pulmonary effort is normal.      Breath sounds: Normal breath sounds. No wheezing or rales.   Abdominal:      General: Abdomen is flat. Bowel sounds are normal. There is no distension.      Palpations: Abdomen is soft.      Tenderness: There is no abdominal tenderness. There is no guarding.   Musculoskeletal:         General: No swelling. Normal range of motion.      Cervical back: Normal range of motion and neck supple.      Comments: Status post left hip fracture repair.  Neurovascularly intact distally.   Skin:     General: Skin is warm and dry.   Neurological:      General: No  focal deficit present.      Mental Status: She is alert.   Psychiatric:         Mood and Affect: Mood normal.         Thought Content: Thought content normal.         Judgment: Judgment normal.             Significant Labs: All pertinent labs within the past 24 hours have been reviewed.    Significant Imaging: I have reviewed all pertinent imaging results/findings within the past 24 hours.

## 2023-12-16 NOTE — SUBJECTIVE & OBJECTIVE
Interval History:  Feeling well today.  Plans to get up with rehab later.  Plans to go to skilled rehab upon discharge    Review of Systems   All other systems reviewed and are negative.    Objective:     Vital Signs (Most Recent):  Temp: 98.4 °F (36.9 °C) (12/16/23 0749)  Pulse: 94 (12/16/23 0749)  Resp: 18 (12/16/23 0751)  BP: (!) 163/72 (notified nurse arpita) (12/16/23 0749)  SpO2: 95 % (12/16/23 0749) Vital Signs (24h Range):  Temp:  [97.8 °F (36.6 °C)-98.5 °F (36.9 °C)] 98.4 °F (36.9 °C)  Pulse:  [] 94  Resp:  [16-18] 18  SpO2:  [94 %-97 %] 95 %  BP: (112-167)/(71-82) 163/72     Weight: 77.1 kg (170 lb)  Body mass index is 30.11 kg/m².    Intake/Output Summary (Last 24 hours) at 12/16/2023 1058  Last data filed at 12/15/2023 2012  Gross per 24 hour   Intake 360 ml   Output 400 ml   Net -40 ml           Physical Exam  Constitutional:       Appearance: Normal appearance. She is normal weight.   HENT:      Head: Normocephalic and atraumatic.      Nose: Nose normal.      Mouth/Throat:      Mouth: Mucous membranes are moist.   Eyes:      Conjunctiva/sclera: Conjunctivae normal.      Pupils: Pupils are equal, round, and reactive to light.   Cardiovascular:      Rate and Rhythm: Normal rate and regular rhythm.      Pulses: Normal pulses.      Heart sounds: Normal heart sounds. No murmur heard.     No friction rub. No gallop.   Pulmonary:      Effort: Pulmonary effort is normal.      Breath sounds: Normal breath sounds. No wheezing or rales.   Abdominal:      General: Abdomen is flat. Bowel sounds are normal. There is no distension.      Palpations: Abdomen is soft.      Tenderness: There is no abdominal tenderness. There is no guarding.   Musculoskeletal:         General: No swelling. Normal range of motion.      Cervical back: Normal range of motion and neck supple.      Comments: Status post left hip fracture repair.  Neurovascularly intact distally.   Skin:     General: Skin is warm and dry.   Neurological:       General: No focal deficit present.      Mental Status: She is alert.   Psychiatric:         Mood and Affect: Mood normal.         Thought Content: Thought content normal.         Judgment: Judgment normal.             Significant Labs: All pertinent labs within the past 24 hours have been reviewed.    Significant Imaging: I have reviewed all pertinent imaging results/findings within the past 24 hours.

## 2023-12-16 NOTE — PT/OT/SLP PROGRESS
Physical Therapy Treatment    Patient Name:  May Solomon   MRN:  [id number]    Recommendations:     Discharge Recommendations: Moderate Intensity Therapy  Discharge Equipment Recommendations: none  Barriers to discharge: Decreased caregiver support    Assessment:     May Solomon is a 73 y.o. female admitted with a medical diagnosis of Closed left hip fracture.  She presents with the following impairments/functional limitations: weakness, impaired endurance, impaired self care skills, impaired functional mobility, gait instability, impaired balance, decreased lower extremity function, decreased safety awareness, pain, edema, impaired cardiopulmonary response to activity, orthopedic precautions .    Pt seen supine in bed, agreeable to PT. Pt reviewed on L posterior hip precautions. Thera  ex in supine. Pt requiring min assist for mobility and ambulated 40ft with RW min assist WBAT LLE. OOB chair post PT  Pt being [facility].    Rehab Prognosis: Fair; patient would benefit from acute skilled PT services to address these deficits and reach maximum level of function.    Recent Surgery: Procedure(s) (LRB):  ARTHROPLASTY, HIP REPLACEMENT (Left) 3 Days Post-Op    Plan:     During this hospitalization, patient to be seen daily to address the identified rehab impairments via gait training, therapeutic activities, therapeutic exercises and progress toward the following goals:    Plan of Care Expires:  01/14/24    Subjective   Pt able to name 2:3 posterior LH precautions  Chief Complaint: stiff  Patient/Family Comments/goals: get well  Pain/Comfort:  Pain Rating 1: 0/10      Objective:     Communicated with nurse Horta prior to session.  Patient found HOB elevated with PureWick, bed alarm, hip abduction pillow upon PT entry to room.     General Precautions: Standard, fall, diabetic  Orthopedic Precautions: LLE weight bearing as tolerated, LLE posterior precautions  Braces: N/A  Respiratory  Status: Room air     Functional Mobility:  Bed Mobility:     Scooting: minimum assistance  Supine to Sit: minimum assistance  Transfers:     Sit to Stand:  minimum assistance with rolling walker  Bed to Chair: minimum assistance with  rolling walker  using  Stand Pivot  Gait: 40ft with RW min assist with WBAT LLE- slow winter but mobilizing well      AM-PAC 6 CLICK MOBILITY  Turning over in bed (including adjusting bedclothes, sheets and blankets)?: 3  Sitting down on and standing up from a chair with arms (e.g., wheelchair, bedside commode, etc.): 3  Moving from lying on back to sitting on the side of the bed?: 3  Moving to and from a bed to a chair (including a wheelchair)?: 3  Need to walk in hospital room?: 3  Climbing 3-5 steps with a railing?: 1  Basic Mobility Total Score: 16       Treatment & Education:  Patient was educated on the importance of OOB activity and functional mobility to negate negative effects of prolonged bed rest during hospitalization, safe transfers and ambulation, and D/C planning   Thera ex with AP,QS/GS,HS  Reviewed on L posterior hip precautions  OOB chair post PT with purewick reapplied    Patient left up in chair with all lines intact, call button in reach, and chair alarm on..    GOALS:   Multidisciplinary Problems       Physical Therapy Goals          Problem: Physical Therapy    Goal Priority Disciplines Outcome Goal Variances Interventions   Physical Therapy Goal     PT, PT/OT Ongoing, Progressing     Description: Goals to be met by: 24     Patient will increase functional independence with mobility by performin. Supine to sit with CGA maintaining VI precautions  2. Sit to stand transfer with Supervision, WBAT L LE  3. Bed to chair transfer with Supervision using Rolling Walker, maintaining VI precautions  4. Gait  x 150 feet with Supervision using Rolling Walker, WBAT L LE.                              Time Tracking:     PT Received On: 23  PT Start Time:  1028     PT Stop Time: 1102  PT Total Time (min): 34 min     Billable Minutes: Gait Training 20 and Therapeutic Exercise 14    Treatment Type: Treatment  PT/PTA: PT     Number of PTA visits since last PT visit: 0     12/16/2023

## 2023-12-17 LAB
ANION GAP SERPL CALC-SCNC: 5 MMOL/L (ref 8–16)
BASOPHILS # BLD AUTO: 0.04 K/UL (ref 0–0.2)
BASOPHILS NFR BLD: 0.5 % (ref 0–1.9)
BUN SERPL-MCNC: 30 MG/DL (ref 8–23)
CALCIUM SERPL-MCNC: 9.3 MG/DL (ref 8.7–10.5)
CHLORIDE SERPL-SCNC: 101 MMOL/L (ref 95–110)
CO2 SERPL-SCNC: 30 MMOL/L (ref 23–29)
CREAT SERPL-MCNC: 0.8 MG/DL (ref 0.5–1.4)
DIFFERENTIAL METHOD BLD: ABNORMAL
EOSINOPHIL # BLD AUTO: 0.3 K/UL (ref 0–0.5)
EOSINOPHIL NFR BLD: 3 % (ref 0–8)
ERYTHROCYTE [DISTWIDTH] IN BLOOD BY AUTOMATED COUNT: 13 % (ref 11.5–14.5)
EST. GFR  (NO RACE VARIABLE): >60 ML/MIN/1.73 M^2
GLUCOSE SERPL-MCNC: 120 MG/DL (ref 70–110)
GLUCOSE SERPL-MCNC: 132 MG/DL (ref 70–110)
GLUCOSE SERPL-MCNC: 136 MG/DL (ref 70–110)
GLUCOSE SERPL-MCNC: 174 MG/DL (ref 70–110)
GLUCOSE SERPL-MCNC: 178 MG/DL (ref 70–110)
HCT VFR BLD AUTO: 30.4 % (ref 37–48.5)
HGB BLD-MCNC: 10 G/DL (ref 12–16)
IMM GRANULOCYTES # BLD AUTO: 0.04 K/UL (ref 0–0.04)
IMM GRANULOCYTES NFR BLD AUTO: 0.5 % (ref 0–0.5)
LYMPHOCYTES # BLD AUTO: 1.6 K/UL (ref 1–4.8)
LYMPHOCYTES NFR BLD: 17.9 % (ref 18–48)
MCH RBC QN AUTO: 29.6 PG (ref 27–31)
MCHC RBC AUTO-ENTMCNC: 32.9 G/DL (ref 32–36)
MCV RBC AUTO: 90 FL (ref 82–98)
MONOCYTES # BLD AUTO: 0.9 K/UL (ref 0.3–1)
MONOCYTES NFR BLD: 10 % (ref 4–15)
NEUTROPHILS # BLD AUTO: 5.9 K/UL (ref 1.8–7.7)
NEUTROPHILS NFR BLD: 68.1 % (ref 38–73)
NRBC BLD-RTO: 0 /100 WBC
PLATELET # BLD AUTO: 231 K/UL (ref 150–450)
PMV BLD AUTO: 10.7 FL (ref 9.2–12.9)
POTASSIUM SERPL-SCNC: 4.3 MMOL/L (ref 3.5–5.1)
RBC # BLD AUTO: 3.38 M/UL (ref 4–5.4)
SODIUM SERPL-SCNC: 136 MMOL/L (ref 136–145)
WBC # BLD AUTO: 8.7 K/UL (ref 3.9–12.7)

## 2023-12-17 PROCEDURE — 12000002 HC ACUTE/MED SURGE SEMI-PRIVATE ROOM

## 2023-12-17 PROCEDURE — 25000003 PHARM REV CODE 250: Performed by: ORTHOPAEDIC SURGERY

## 2023-12-17 PROCEDURE — 36415 COLL VENOUS BLD VENIPUNCTURE: CPT | Performed by: STUDENT IN AN ORGANIZED HEALTH CARE EDUCATION/TRAINING PROGRAM

## 2023-12-17 PROCEDURE — 25000003 PHARM REV CODE 250: Performed by: STUDENT IN AN ORGANIZED HEALTH CARE EDUCATION/TRAINING PROGRAM

## 2023-12-17 PROCEDURE — 94799 UNLISTED PULMONARY SVC/PX: CPT

## 2023-12-17 PROCEDURE — 85025 COMPLETE CBC W/AUTO DIFF WBC: CPT | Performed by: STUDENT IN AN ORGANIZED HEALTH CARE EDUCATION/TRAINING PROGRAM

## 2023-12-17 PROCEDURE — 97530 THERAPEUTIC ACTIVITIES: CPT

## 2023-12-17 PROCEDURE — 94761 N-INVAS EAR/PLS OXIMETRY MLT: CPT

## 2023-12-17 PROCEDURE — 80048 BASIC METABOLIC PNL TOTAL CA: CPT | Performed by: STUDENT IN AN ORGANIZED HEALTH CARE EDUCATION/TRAINING PROGRAM

## 2023-12-17 PROCEDURE — 99900035 HC TECH TIME PER 15 MIN (STAT)

## 2023-12-17 PROCEDURE — 97116 GAIT TRAINING THERAPY: CPT

## 2023-12-17 PROCEDURE — 99900031 HC PATIENT EDUCATION (STAT)

## 2023-12-17 RX ADMIN — HYDROCODONE BITARTRATE AND ACETAMINOPHEN 1 TABLET: 5; 325 TABLET ORAL at 04:12

## 2023-12-17 RX ADMIN — MUPIROCIN 1 G: 20 OINTMENT TOPICAL at 08:12

## 2023-12-17 RX ADMIN — ZOLPIDEM TARTRATE 5 MG: 5 TABLET, COATED ORAL at 11:12

## 2023-12-17 RX ADMIN — HYDROCODONE BITARTRATE AND ACETAMINOPHEN 1 TABLET: 5; 325 TABLET ORAL at 08:12

## 2023-12-17 RX ADMIN — HYDROCODONE BITARTRATE AND ACETAMINOPHEN 1 TABLET: 5; 325 TABLET ORAL at 03:12

## 2023-12-17 RX ADMIN — ASPIRIN 81 MG 81 MG: 81 TABLET ORAL at 08:12

## 2023-12-17 RX ADMIN — LOSARTAN POTASSIUM 100 MG: 50 TABLET, FILM COATED ORAL at 08:12

## 2023-12-17 NOTE — ASSESSMENT & PLAN NOTE
Bedrest.  Admit to med surg.  Postop day 4 from hip fracture repair  Continue pain control  Weight-bearing as tolerated  Continue PT and OT  DVT prophylaxis with 81 mg ASA b.i.d..  Will need placement on Monday

## 2023-12-17 NOTE — PROGRESS NOTES
"[facility]  Orthopedics  Progress Note    Patient Name: May Solomon  MRN: [id number]  Admission Date: 12/12/2023  Hospital Length of Stay: 5 days  Attending Provider: Ankit Bueno MD  Primary Care Provider: Irineo Freeman Jr., MD  Follow-up For: Procedure(s) (LRB):  ARTHROPLASTY, HIP REPLACEMENT (Left)    Post-Operative Day: 4 Days Post-Op  Subjective:     Principal Problem:Closed left hip fracture    Principal Orthopedic Problem: S/P L VI 2/2 Fx    Interval History: Rehab consult    Review of patient's allergies indicates:   Allergen Reactions    Sulfa (sulfonamide antibiotics) Rash       Current Facility-Administered Medications   Medication    aspirin chewable tablet 81 mg    dextrose 10% bolus 125 mL 125 mL    dextrose 10% bolus 250 mL 250 mL    enoxaparin injection 40 mg    glucagon (human recombinant) injection 1 mg    glucose chewable tablet 16 g    glucose chewable tablet 24 g    HYDROcodone-acetaminophen 5-325 mg per tablet 1 tablet    insulin aspart U-100 pen 0-5 Units    losartan tablet 100 mg    morphine injection 6 mg    mupirocin 2 % ointment 1 g    naloxone 0.4 mg/mL injection 0.02 mg    ondansetron injection 4 mg    sodium chloride 0.9% flush 10 mL    sodium chloride 0.9% flush 5 mL    zolpidem tablet 5 mg     Objective:     Vital Signs (Most Recent):  Temp: 98.6 °F (37 °C) (12/17/23 0741)  Pulse: 87 (12/17/23 0741)  Resp: 18 (12/17/23 0741)  BP: (!) 157/67 (notified nurse nemo) (12/17/23 0741)  SpO2: 95 % (12/17/23 0741) Vital Signs (24h Range):  Temp:  [97.8 °F (36.6 °C)-99 °F (37.2 °C)] 98.6 °F (37 °C)  Pulse:  [78-96] 87  Resp:  [16-18] 18  SpO2:  [94 %-97 %] 95 %  BP: (156-165)/(67-88) 157/67     Weight: 77.1 kg (170 lb)  Height: 5' 3" (160 cm)  Body mass index is 30.11 kg/m².      Intake/Output Summary (Last 24 hours) at 12/17/2023 0751  Last data filed at 12/17/2023 0651  Gross per 24 hour   Intake 240 ml   Output 1500 ml   Net -1260 ml "         General    Nursing note and vitals reviewed.  Constitutional: She is oriented to person, place, and time. She appears well-developed and well-nourished.   Pulmonary/Chest: Effort normal.   Neurological: She is alert and oriented to person, place, and time.   Psychiatric: She has a normal mood and affect. Her behavior is normal.         Left Hip Exam     Comments:  LLE DNVI (distal neurovascular intact). Dressing clean, dry, intact. Sitting up with PT.             Significant Labs: CBC:   Recent Labs   Lab 12/16/23  0922 12/17/23  0534   WBC 7.36 8.70   HGB 10.1* 10.0*   HCT 30.6* 30.4*    231       CMP:   Recent Labs   Lab 12/16/23  0922 12/17/23  0534    136   K 3.9 4.3    101   CO2 28 30*   * 132*   BUN 27* 30*   CREATININE 0.8 0.8   CALCIUM 9.5 9.3   ANIONGAP 8 5*       All pertinent labs within the past 24 hours have been reviewed.    Significant Imaging: None  Assessment/Plan:     Status post left hip replacement  POD#4    Cont OOB with PT and nursing  LLE WBAT  Change dressing qd  SNF consult is pending    Stable from an ortho stand point          DOUG NASSAR  Orthopedics  [facility]

## 2023-12-17 NOTE — ASSESSMENT & PLAN NOTE
POD#4    Cont OOB with PT and nursing  LLE WBAT  Change dressing qd  SNF consult is pending    Stable from an ortho stand point

## 2023-12-17 NOTE — PLAN OF CARE
Pt AAOx 4. Rested on and off this shift. Pain managed with PRN medication. Overhead trapeze in place and used by pt for mobility. No acute events overnight. No signs, symptoms, or complaints of distress at this time. Care ongoing.    Problem: Infection  Goal: Absence of Infection Signs and Symptoms  Outcome: Ongoing, Progressing     Problem: Adult Inpatient Plan of Care  Goal: Plan of Care Review  Outcome: Ongoing, Progressing  Goal: Patient-Specific Goal (Individualized)  Outcome: Ongoing, Progressing  Goal: Absence of Hospital-Acquired Illness or Injury  Outcome: Ongoing, Progressing  Goal: Optimal Comfort and Wellbeing  Outcome: Ongoing, Progressing  Goal: Readiness for Transition of Care  Outcome: Ongoing, Progressing     Problem: Fall Injury Risk  Goal: Absence of Fall and Fall-Related Injury  Outcome: Ongoing, Progressing     Problem: Skin Injury Risk Increased  Goal: Skin Health and Integrity  Outcome: Ongoing, Progressing     Problem: Diabetes Comorbidity  Goal: Blood Glucose Level Within Targeted Range  Outcome: Ongoing, Progressing     Problem: Impaired Wound Healing  Goal: Optimal Wound Healing  Outcome: Ongoing, Progressing

## 2023-12-17 NOTE — PLAN OF CARE
Problem: Physical Therapy  Goal: Physical Therapy Goal  Description: Goals to be met by: 24     Patient will increase functional independence with mobility by performin. Supine to sit with CGA maintaining VI precautions  2. Sit to stand transfer with Supervision, WBAT L LE  3. Bed to chair transfer with Supervision using Rolling Walker, maintaining VI precautions  4. Gait  x 150 feet with Supervision using Rolling Walker, WBAT L LE.         Outcome: Ongoing, Progressing   Pt ambulated 40ft x2 with RW and up in chair. Reviewed on L posterior hip precautions. C/o sciatica R side. IPR

## 2023-12-17 NOTE — SUBJECTIVE & OBJECTIVE
Interval History:  No complaints.  Doing well.  Pending placement.    Review of Systems   All other systems reviewed and are negative.    Objective:     Vital Signs (Most Recent):  Temp: 98.2 °F (36.8 °C) (12/17/23 1100)  Pulse: 109 (12/17/23 1100)  Resp: 18 (12/17/23 1100)  BP: 139/66 (12/17/23 1100)  SpO2: 95 % (12/17/23 1100) Vital Signs (24h Range):  Temp:  [97.8 °F (36.6 °C)-99 °F (37.2 °C)] 98.2 °F (36.8 °C)  Pulse:  [] 109  Resp:  [16-18] 18  SpO2:  [94 %-97 %] 95 %  BP: (139-164)/(66-88) 139/66     Weight: 77.1 kg (170 lb)  Body mass index is 30.11 kg/m².    Intake/Output Summary (Last 24 hours) at 12/17/2023 1452  Last data filed at 12/17/2023 0651  Gross per 24 hour   Intake 240 ml   Output 700 ml   Net -460 ml           Physical Exam  Constitutional:       Appearance: Normal appearance. She is normal weight.   HENT:      Head: Normocephalic and atraumatic.      Nose: Nose normal.      Mouth/Throat:      Mouth: Mucous membranes are moist.   Eyes:      Conjunctiva/sclera: Conjunctivae normal.      Pupils: Pupils are equal, round, and reactive to light.   Cardiovascular:      Rate and Rhythm: Normal rate and regular rhythm.      Pulses: Normal pulses.      Heart sounds: Normal heart sounds. No murmur heard.     No friction rub. No gallop.   Pulmonary:      Effort: Pulmonary effort is normal.      Breath sounds: Normal breath sounds. No wheezing or rales.   Abdominal:      General: Abdomen is flat. Bowel sounds are normal. There is no distension.      Palpations: Abdomen is soft.      Tenderness: There is no abdominal tenderness. There is no guarding.   Musculoskeletal:         General: No swelling. Normal range of motion.      Cervical back: Normal range of motion and neck supple.      Comments: Status post left hip fracture repair.  Neurovascularly intact distally.   Skin:     General: Skin is warm and dry.   Neurological:      General: No focal deficit present.      Mental Status: She is alert.    Psychiatric:         Mood and Affect: Mood normal.         Thought Content: Thought content normal.         Judgment: Judgment normal.             Significant Labs: All pertinent labs within the past 24 hours have been reviewed.    Significant Imaging: I have reviewed all pertinent imaging results/findings within the past 24 hours.

## 2023-12-17 NOTE — PROGRESS NOTES
[facility] hospital Medicine  Progress Note    Patient Name: May Solomon  MRN: [id number]  Patient Class: IP- Inpatient   Admission Date: 12/12/2023  Length of Stay: 5 days  Attending Physician: Ankit Bueno MD  Primary Care Provider: Irineo Freeman Jr., MD        Subjective:     Principal Problem:Closed left hip fracture        HPI:  Ms. Solomon is a 73 year old woman with PMHx of HTN, DM2- presented with hip pain after a fall.  Imaging shows proximal femur fracture.  The fall occurred by accident when patient tripped over a trailer hitch on her vehicle. She recently had knee arthroscopy and meniscectomy.  No issues with the surgery.  Dr. Tong's her orthopedist.     Pt is NPO for surgery tomorrow.    Overview/Hospital Course:  No notes on file    Interval History:  No complaints.  Doing well.  Pending placement.    Review of Systems   All other systems reviewed and are negative.    Objective:     Vital Signs (Most Recent):  Temp: 98.2 °F (36.8 °C) (12/17/23 1100)  Pulse: 109 (12/17/23 1100)  Resp: 18 (12/17/23 1100)  BP: 139/66 (12/17/23 1100)  SpO2: 95 % (12/17/23 1100) Vital Signs (24h Range):  Temp:  [97.8 °F (36.6 °C)-99 °F (37.2 °C)] 98.2 °F (36.8 °C)  Pulse:  [] 109  Resp:  [16-18] 18  SpO2:  [94 %-97 %] 95 %  BP: (139-164)/(66-88) 139/66     Weight: 77.1 kg (170 lb)  Body mass index is 30.11 kg/m².    Intake/Output Summary (Last 24 hours) at 12/17/2023 1458  Last data filed at 12/17/2023 0651  Gross per 24 hour   Intake 240 ml   Output 700 ml   Net -460 ml           Physical Exam  Constitutional:       Appearance: Normal appearance. She is normal weight.   HENT:      Head: Normocephalic and atraumatic.      Nose: Nose normal.      Mouth/Throat:      Mouth: Mucous membranes are moist.   Eyes:      Conjunctiva/sclera: Conjunctivae normal.      Pupils: Pupils are equal, round, and reactive to light.   Cardiovascular:      Rate and Rhythm: Normal rate and regular  "rhythm.      Pulses: Normal pulses.      Heart sounds: Normal heart sounds. No murmur heard.     No friction rub. No gallop.   Pulmonary:      Effort: Pulmonary effort is normal.      Breath sounds: Normal breath sounds. No wheezing or rales.   Abdominal:      General: Abdomen is flat. Bowel sounds are normal. There is no distension.      Palpations: Abdomen is soft.      Tenderness: There is no abdominal tenderness. There is no guarding.   Musculoskeletal:         General: No swelling. Normal range of motion.      Cervical back: Normal range of motion and neck supple.      Comments: Status post left hip fracture repair.  Neurovascularly intact distally.   Skin:     General: Skin is warm and dry.   Neurological:      General: No focal deficit present.      Mental Status: She is alert.   Psychiatric:         Mood and Affect: Mood normal.         Thought Content: Thought content normal.         Judgment: Judgment normal.             Significant Labs: All pertinent labs within the past 24 hours have been reviewed.    Significant Imaging: I have reviewed all pertinent imaging results/findings within the past 24 hours.    Assessment/Plan:      * Closed left hip fracture  Bedrest.  Admit to med surg.  Postop day 4 from hip fracture repair  Continue pain control  Weight-bearing as tolerated  Continue PT and OT  DVT prophylaxis with 81 mg ASA b.i.d..  Will need placement on Monday      Status post left hip replacement  Doing well postoperatively, she was very weak and will need placement for rehab prior to returning home.      Diabetes mellitus, type 2  Patient's FSGs will be monitored.  Last A1c reviewed-   Lab Results   Component Value Date    HGBA1C 6.3 (H) 04/17/2023     Most recent fingerstick glucose reviewed-   No results for input(s): "POCTGLUCOSE" in the last 24 hours.    Will use low dose sliding scale.  Do not resume any oral DM meds while pt admitted.    HTN (hypertension)  Chronic, controlled. Latest blood " pressure and vitals reviewed-     Temp:  [98 °F (36.7 °C)-98.5 °F (36.9 °C)]   Pulse:  [70-95]   Resp:  [16-20]   BP: (103-178)/(51-93)   SpO2:  [95 %-98 %] .   Home meds for hypertension were reviewed and noted below.   Hypertension Medications               losartan (COZAAR) 50 MG tablet Take 1 tablet (50 mg total) by mouth once daily.            While in the hospital, will manage blood pressure as follows; Continue home antihypertensive regimen      VTE Risk Mitigation (From admission, onward)           Ordered     IP VTE LOW RISK PATIENT  Once         12/13/23 1239     Place sequential compression device  Until discontinued         12/13/23 1239     enoxaparin injection 40 mg  Once         12/12/23 2322     Place sequential compression device  Until discontinued         12/12/23 2322                    Discharge Planning   RAVI: 12/18/2023     Code Status: Full Code   Is the patient medically ready for discharge?:     Reason for patient still in hospital (select all that apply): Treatment  Discharge Plan A: Skilled Nursing Facility   Discharge Delays: None known at this time              Ankit Bueno MD  Department of Hospital Medicine   [facility]

## 2023-12-17 NOTE — SUBJECTIVE & OBJECTIVE
"Principal Problem:Closed left hip fracture    Principal Orthopedic Problem: S/P L VI 2/2 Fx    Interval History: Rehab consult    Review of patient's allergies indicates:   Allergen Reactions    Sulfa (sulfonamide antibiotics) Rash       Current Facility-Administered Medications   Medication    aspirin chewable tablet 81 mg    dextrose 10% bolus 125 mL 125 mL    dextrose 10% bolus 250 mL 250 mL    enoxaparin injection 40 mg    glucagon (human recombinant) injection 1 mg    glucose chewable tablet 16 g    glucose chewable tablet 24 g    HYDROcodone-acetaminophen 5-325 mg per tablet 1 tablet    insulin aspart U-100 pen 0-5 Units    losartan tablet 100 mg    morphine injection 6 mg    mupirocin 2 % ointment 1 g    naloxone 0.4 mg/mL injection 0.02 mg    ondansetron injection 4 mg    sodium chloride 0.9% flush 10 mL    sodium chloride 0.9% flush 5 mL    zolpidem tablet 5 mg     Objective:     Vital Signs (Most Recent):  Temp: 98.6 °F (37 °C) (12/17/23 0741)  Pulse: 87 (12/17/23 0741)  Resp: 18 (12/17/23 0741)  BP: (!) 157/67 (notified nurse nemo) (12/17/23 0741)  SpO2: 95 % (12/17/23 0741) Vital Signs (24h Range):  Temp:  [97.8 °F (36.6 °C)-99 °F (37.2 °C)] 98.6 °F (37 °C)  Pulse:  [78-96] 87  Resp:  [16-18] 18  SpO2:  [94 %-97 %] 95 %  BP: (156-165)/(67-88) 157/67     Weight: 77.1 kg (170 lb)  Height: 5' 3" (160 cm)  Body mass index is 30.11 kg/m².      Intake/Output Summary (Last 24 hours) at 12/17/2023 0751  Last data filed at 12/17/2023 0651  Gross per 24 hour   Intake 240 ml   Output 1500 ml   Net -1260 ml          General    Nursing note and vitals reviewed.  Constitutional: She is oriented to person, place, and time. She appears well-developed and well-nourished.   Pulmonary/Chest: Effort normal.   Neurological: She is alert and oriented to person, place, and time.   Psychiatric: She has a normal mood and affect. Her behavior is normal.         Left Hip Exam     Comments:  KERRY KRAFT (distal neurovascular " intact). Dressing clean, dry, intact. Sitting up with PT.             Significant Labs: CBC:   Recent Labs   Lab 12/16/23  0922 12/17/23  0534   WBC 7.36 8.70   HGB 10.1* 10.0*   HCT 30.6* 30.4*    231       CMP:   Recent Labs   Lab 12/16/23  0922 12/17/23  0534    136   K 3.9 4.3    101   CO2 28 30*   * 132*   BUN 27* 30*   CREATININE 0.8 0.8   CALCIUM 9.5 9.3   ANIONGAP 8 5*       All pertinent labs within the past 24 hours have been reviewed.    Significant Imaging: None

## 2023-12-17 NOTE — PT/OT/SLP PROGRESS
Physical Therapy Treatment    Patient Name:  May Solomon   MRN:  [id number]    Recommendations:     Discharge Recommendations: Moderate Intensity Therapy  Discharge Equipment Recommendations: none  Barriers to discharge: Decreased caregiver support    Assessment:     May Solomon is a 73 y.o. female admitted with a medical diagnosis of Closed left hip fracture.  She presents with the following impairments/functional limitations: weakness, impaired endurance, impaired self care skills, impaired functional mobility, gait instability, impaired balance, decreased lower extremity function, decreased safety awareness, pain, edema, impaired cardiopulmonary response to activity, orthopedic precautions .    Pt seen supine in bed and agreeable to PT. Pt stated that she has been doing her exercises. Pt c/o R lateral leg pain attributing to sciatica and requesting ice pack- provided by PT. Pt requiring min assist mobility and ambulated 40ft x2 with RW with several rest stands. OOB chair..    Rehab Prognosis: Good; patient would benefit from acute skilled PT services to address these deficits and reach maximum level of function.    Recent Surgery: Procedure(s) (LRB):  ARTHROPLASTY, HIP REPLACEMENT (Left) 4 Days Post-Op    Plan:     During this hospitalization, patient to be seen daily to address the identified rehab impairments via gait training, therapeutic activities, therapeutic exercises and progress toward the following goals:    Plan of Care Expires:  01/14/24    Subjective     Chief Complaint: weakness and R sciatica pain  Patient/Family Comments/goals: get well to go home  Pain/Comfort:  Pain Rating 1: 3/10  Location - Side 1: Left  Location 1: hip  Pain Addressed 1: Pre-medicate for activity, Reposition      Objective:     Communicated with nurse Spencer prior to session.  Patient found HOB elevated with PureWick, hip abduction pillow upon PT entry to room.     General Precautions: Standard, fall,  diabetic  Orthopedic Precautions: LLE weight bearing as tolerated, LLE posterior precautions  Braces: N/A  Respiratory Status: Room air     Functional Mobility:  Bed Mobility:     Scooting: minimum assistance  Supine to Sit: minimum assistance  Transfers:     Sit to Stand:  minimum assistance with rolling walker  Bed to Chair: minimum assistance with  rolling walker  using  Stand Pivot  Gait: 40ft x2 with RW min assist with several rest stands      AM-PAC 6 CLICK MOBILITY  Turning over in bed (including adjusting bedclothes, sheets and blankets)?: 3  Sitting down on and standing up from a chair with arms (e.g., wheelchair, bedside commode, etc.): 3  Moving from lying on back to sitting on the side of the bed?: 3  Moving to and from a bed to a chair (including a wheelchair)?: 3  Need to walk in hospital room?: 3  Climbing 3-5 steps with a railing?: 1  Basic Mobility Total Score: 16       Treatment & Education:  Patient was educated on the importance of OOB activity and functional mobility to negate negative effects of prolonged bed rest during hospitalization, safe transfers and ambulation, and D/C planning   OOB chair post PT  Reviewed on L posterior hip precautions  Ice pack R lower leg per request    Patient left up in chair with all lines intact, call button in reach, and chair alarm on..    GOALS:   Multidisciplinary Problems       Physical Therapy Goals          Problem: Physical Therapy    Goal Priority Disciplines Outcome Goal Variances Interventions   Physical Therapy Goal     PT, PT/OT Ongoing, Progressing     Description: Goals to be met by: 24     Patient will increase functional independence with mobility by performin. Supine to sit with CGA maintaining VI precautions  2. Sit to stand transfer with Supervision, WBAT L LE  3. Bed to chair transfer with Supervision using Rolling Walker, maintaining VI precautions  4. Gait  x 150 feet with Supervision using Rolling Walker, WBAT L LE.                               Time Tracking:     PT Received On: 12/17/23  PT Start Time: 0958     PT Stop Time: 1024  PT Total Time (min): 26 min     Billable Minutes: Gait Training 16 and Therapeutic Activity 10    Treatment Type: Treatment  PT/PTA: PT     Number of PTA visits since last PT visit: 0     12/17/2023

## 2023-12-18 VITALS
OXYGEN SATURATION: 97 % | WEIGHT: 170 LBS | HEIGHT: 63 IN | SYSTOLIC BLOOD PRESSURE: 133 MMHG | BODY MASS INDEX: 30.12 KG/M2 | TEMPERATURE: 98 F | RESPIRATION RATE: 18 BRPM | DIASTOLIC BLOOD PRESSURE: 73 MMHG | HEART RATE: 106 BPM

## 2023-12-18 LAB
ANION GAP SERPL CALC-SCNC: 9 MMOL/L (ref 8–16)
BASOPHILS # BLD AUTO: 0.04 K/UL (ref 0–0.2)
BASOPHILS NFR BLD: 0.6 % (ref 0–1.9)
BUN SERPL-MCNC: 30 MG/DL (ref 8–23)
CALCIUM SERPL-MCNC: 9.4 MG/DL (ref 8.7–10.5)
CHLORIDE SERPL-SCNC: 101 MMOL/L (ref 95–110)
CO2 SERPL-SCNC: 28 MMOL/L (ref 23–29)
CREAT SERPL-MCNC: 0.8 MG/DL (ref 0.5–1.4)
DIFFERENTIAL METHOD BLD: ABNORMAL
EOSINOPHIL # BLD AUTO: 0.2 K/UL (ref 0–0.5)
EOSINOPHIL NFR BLD: 3.2 % (ref 0–8)
ERYTHROCYTE [DISTWIDTH] IN BLOOD BY AUTOMATED COUNT: 13 % (ref 11.5–14.5)
EST. GFR  (NO RACE VARIABLE): >60 ML/MIN/1.73 M^2
GLUCOSE SERPL-MCNC: 134 MG/DL (ref 70–110)
GLUCOSE SERPL-MCNC: 135 MG/DL (ref 70–110)
GLUCOSE SERPL-MCNC: 139 MG/DL (ref 70–110)
HCT VFR BLD AUTO: 31.5 % (ref 37–48.5)
HGB BLD-MCNC: 10.2 G/DL (ref 12–16)
IMM GRANULOCYTES # BLD AUTO: 0.04 K/UL (ref 0–0.04)
IMM GRANULOCYTES NFR BLD AUTO: 0.6 % (ref 0–0.5)
LYMPHOCYTES # BLD AUTO: 1.9 K/UL (ref 1–4.8)
LYMPHOCYTES NFR BLD: 26.3 % (ref 18–48)
MCH RBC QN AUTO: 29.2 PG (ref 27–31)
MCHC RBC AUTO-ENTMCNC: 32.4 G/DL (ref 32–36)
MCV RBC AUTO: 90 FL (ref 82–98)
MONOCYTES # BLD AUTO: 0.7 K/UL (ref 0.3–1)
MONOCYTES NFR BLD: 9.9 % (ref 4–15)
NEUTROPHILS # BLD AUTO: 4.2 K/UL (ref 1.8–7.7)
NEUTROPHILS NFR BLD: 59.4 % (ref 38–73)
NRBC BLD-RTO: 0 /100 WBC
PLATELET # BLD AUTO: 250 K/UL (ref 150–450)
PMV BLD AUTO: 10.2 FL (ref 9.2–12.9)
POTASSIUM SERPL-SCNC: 4.4 MMOL/L (ref 3.5–5.1)
RBC # BLD AUTO: 3.49 M/UL (ref 4–5.4)
SODIUM SERPL-SCNC: 138 MMOL/L (ref 136–145)
WBC # BLD AUTO: 7.14 K/UL (ref 3.9–12.7)

## 2023-12-18 PROCEDURE — 97116 GAIT TRAINING THERAPY: CPT

## 2023-12-18 PROCEDURE — 36415 COLL VENOUS BLD VENIPUNCTURE: CPT | Performed by: STUDENT IN AN ORGANIZED HEALTH CARE EDUCATION/TRAINING PROGRAM

## 2023-12-18 PROCEDURE — 25000003 PHARM REV CODE 250: Performed by: ORTHOPAEDIC SURGERY

## 2023-12-18 PROCEDURE — 80048 BASIC METABOLIC PNL TOTAL CA: CPT | Performed by: STUDENT IN AN ORGANIZED HEALTH CARE EDUCATION/TRAINING PROGRAM

## 2023-12-18 PROCEDURE — 25000003 PHARM REV CODE 250: Performed by: STUDENT IN AN ORGANIZED HEALTH CARE EDUCATION/TRAINING PROGRAM

## 2023-12-18 PROCEDURE — 85025 COMPLETE CBC W/AUTO DIFF WBC: CPT | Performed by: STUDENT IN AN ORGANIZED HEALTH CARE EDUCATION/TRAINING PROGRAM

## 2023-12-18 RX ORDER — GUAIFENESIN 100 MG/5ML
81 LIQUID (ML) ORAL 2 TIMES DAILY
Qty: 180 TABLET | Refills: 0 | Status: SHIPPED | OUTPATIENT
Start: 2023-12-18 | End: 2023-12-29

## 2023-12-18 RX ORDER — HYDROCODONE BITARTRATE AND ACETAMINOPHEN 5; 325 MG/1; MG/1
1 TABLET ORAL EVERY 4 HOURS PRN
Qty: 30 TABLET | Refills: 0 | Status: SHIPPED | OUTPATIENT
Start: 2023-12-18 | End: 2023-12-23

## 2023-12-18 RX ADMIN — LOSARTAN POTASSIUM 100 MG: 50 TABLET, FILM COATED ORAL at 08:12

## 2023-12-18 RX ADMIN — HYDROCODONE BITARTRATE AND ACETAMINOPHEN 1 TABLET: 5; 325 TABLET ORAL at 06:12

## 2023-12-18 RX ADMIN — ASPIRIN 81 MG 81 MG: 81 TABLET ORAL at 08:12

## 2023-12-18 NOTE — PT/OT/SLP PROGRESS
Physical Therapy Treatment    Patient Name:  May Solomon   MRN:  [id number]    Recommendations:     Discharge Recommendations: Moderate Intensity Therapy  Discharge Equipment Recommendations: none  Barriers to discharge: Decreased caregiver support    Assessment:     May Solomon is a 73 y.o. female admitted with a medical diagnosis of Closed left hip fracture.  She presents with the following impairments/functional limitations: weakness, impaired endurance, impaired self care skills, impaired functional mobility, gait instability, impaired balance, decreased lower extremity function, decreased safety awareness, pain, edema, impaired cardiopulmonary response to activity, orthopedic precautions.    Pt found HOB elevated and agreeable to PT session. Pt tolerated session fairly well and required minimum assist and vc for transfers and gait. Pt has demonstrated sufficient progression of mobility to warrant High Intensity Therapy in order to return to prior level of modified independence.    Rehab Prognosis: Good; patient would benefit from acute skilled PT services to address these deficits and reach maximum level of function.    Recent Surgery: Procedure(s) (LRB):  ARTHROPLASTY, HIP REPLACEMENT (Left) 5 Days Post-Op    Plan:     During this hospitalization, patient to be seen daily to address the identified rehab impairments via gait training, therapeutic activities, therapeutic exercises and progress toward the following goals:    Plan of Care Expires:  01/14/24    Subjective     Chief Complaint: pt reported she hasn't slept much  Patient/Family Comments/goals: pt eager to return to independence.  Pain/Comfort:  Pain Rating 1:  (not rated)  Location - Side 1: Left  Location 1: hip  Pain Addressed 1: Reposition, Distraction, Pre-medicate for activity      Objective:     Communicated with DICK Spencer prior to session.  Patient found HOB elevated with bed alarm, pérez catheter, hip abduction pillow,  peripheral IV, telemetry upon PT entry to room.     General Precautions: Standard, diabetic, fall  Orthopedic Precautions: LLE weight bearing as tolerated, LLE posterior precautions  Braces: N/A  Respiratory Status: Room air     Functional Mobility:  Bed Mobility:     Scooting: minimum assistance  Supine to Sit: minimum assistance  Transfers:     Sit to Stand:  minimum assistance and vc with rolling walker  Gait: x 2 trials of 50 feet each with RW and minimum assistance plus vc for VI precautions during turn      AM-PAC 6 CLICK MOBILITY          Treatment & Education:  Pt was educated on the following: call light use, importance of OOB activity and functional mobility to negate the negative effects of prolonged bed rest during this hospitalization, safe transfers/ambulation and discharge planning recommendations/options.      Patient left up in chair with all lines intact, call button in reach, chair alarm on, and extender present..    GOALS:   Multidisciplinary Problems       Physical Therapy Goals          Problem: Physical Therapy    Goal Priority Disciplines Outcome Goal Variances Interventions   Physical Therapy Goal     PT, PT/OT Ongoing, Progressing     Description: Goals to be met by: 24     Patient will increase functional independence with mobility by performin. Supine to sit with CGA maintaining VI precautions  2. Sit to stand transfer with Supervision, WBAT L LE  3. Bed to chair transfer with Supervision using Rolling Walker, maintaining VI precautions  4. Gait  x 150 feet with Supervision using Rolling Walker, WBAT L LE.                              Time Tracking:     PT Received On: 23  PT Start Time: 1019     PT Stop Time: 1035  PT Total Time (min): 16 min     Billable Minutes: Gait Training 16    Treatment Type: Treatment  PT/PTA: PT     Number of PTA visits since last PT visit: 0     2023

## 2023-12-18 NOTE — PLAN OF CARE
Per Claire Rileybriar), authorization currently pending.  to follow-up.       12/18/23 0849   Post-Acute Status   Post-Acute Authorization Placement   Post-Acute Placement Status Pending payor review/awaiting authorization (if required)

## 2023-12-18 NOTE — NURSING
Patient IV taken out discharge instructions given and questions answered. Report called in to [facility]

## 2023-12-18 NOTE — HOSPITAL COURSE
Pt is a 74 y/o F with hx of HTN, DMII who presented after a fall at home where she tripped over a trailer hitch. She subsequently fractured her left hip and required total hip arthroplasty. She had successful postoperative course and is doing well. She is too weak to return home and is going to rehab for further strengthening. She will continue ASA, 81 mg BID for DVT prophylaxis. She will follow up with her orthopedist in 2-3 weeks after discharge.

## 2023-12-18 NOTE — RESPIRATORY THERAPY
12/17/23 2018   Patient Assessment/Suction   Level of Consciousness (AVPU) alert   Respiratory Effort Normal;Unlabored   Expansion/Accessory Muscles/Retractions no use of accessory muscles;expansion symmetric;no retractions   All Lung Fields Breath Sounds Anterior:;clear;equal bilaterally   Rhythm/Pattern, Respiratory unlabored;pattern regular;depth regular   Cough Frequency no cough   Cough Type no productive sputum   PRE-TX-O2   Device (Oxygen Therapy) room air   SpO2 (!) 94 %   Pulse Oximetry Type Intermittent   $ Pulse Oximetry - Multiple Charge Pulse Oximetry - Multiple   Pulse 104   Resp 19   Positioning HOB elevated 30 degrees   Education   $ Education 15 min;Other (see comment)  (o2 check)   Respiratory Evaluation   $ Care Plan Tech Time 15 min   $ Eval/Re-eval Charges Re-evaluation

## 2023-12-18 NOTE — DISCHARGE SUMMARY
[facility] Medicine  Discharge Summary      Patient Name: May Solomon  MRN: [id number]  LILLIAM: [id number]  Patient Class: IP- Inpatient  Admission Date: 12/12/2023  Hospital Length of Stay: 6 days  Discharge Date and Time:  12/18/2023 2:46 PM  Attending Physician: Ankit Bueno MD   Discharging Provider: Ankit Bueno MD  Primary Care Provider: Iirneo Freeman Jr., MD    Primary Care Team: Networked reference to record PCT     HPI:   Ms. Solomon is a 73 year old woman with PMHx of HTN, DM2- presented with hip pain after a fall.  Imaging shows proximal femur fracture.  The fall occurred by accident when patient tripped over a trailer hitch on her vehicle. She recently had knee arthroscopy and meniscectomy.  No issues with the surgery.  Dr. Tong's her orthopedist.     Pt is NPO for surgery tomorrow.    Procedure(s) (LRB):  ARTHROPLASTY, HIP REPLACEMENT (Left)      Hospital Course:   Pt is a 74 y/o F with hx of HTN, DMII who presented after a fall at home where she tripped over a trailer hitch. She subsequently fractured her left hip and required total hip arthroplasty. She had successful postoperative course and is doing well. She is too weak to return home and is going to rehab for further strengthening. She will continue ASA, 81 mg BID for DVT prophylaxis. She will follow up with her orthopedist in 2-3 weeks after discharge.      Goals of Care Treatment Preferences:  Code Status: Full Code      Consults:   Consults (From admission, onward)          Status Ordering Provider     Inpatient consult to Registered Dietitian/Nutritionist  Once        Provider:  (Not yet assigned)    Ordered ANKIT BUENO     Inpatient consult to   Once        Provider:  (Not yet assigned)    Acknowledged ANKIT BUENO            No new Assessment & Plan notes have been filed under this hospital service since the last note was generated.  Service: Hospital  Medicine    Final Active Diagnoses:    Diagnosis Date Noted POA    PRINCIPAL PROBLEM:  Closed left hip fracture [S72.002A] 12/12/2023 Yes    Status post left hip replacement [Z96.642] 12/14/2023 Not Applicable    Diabetes mellitus, type 2 [E11.9]  Yes    HTN (hypertension) [I10]  Yes      Problems Resolved During this Admission:       Discharged Condition: good    Disposition: Rehab Facility    Follow Up:   Contact information for follow-up providers       Irineo Freeman Jr., MD. Schedule an appointment as soon as possible for a visit in 1 week(s).    Specialty: Internal Medicine  Contact information:  [address]  [address]  [place] [address]  [phone]               Sachin Tong MD. Schedule an appointment as soon as possible for a visit in 2 week(s).    Specialties: Orthopedic Surgery, Surgery, Sports Medicine  Contact information:  [address]  [address]  [place] [address]  [phone]                       Contact information for after-discharge care       Destination       [facility] .    Service: Skilled Nursing  Contact information:  [address]  [place] [address]  [phone]                                 Patient Instructions:      Diet Adult Regular     Change dressing (specify)   Order Comments: Dressing change: daily until clinic follow up     Activity as tolerated       Significant Diagnostic Studies: Labs: BMP:   Recent Labs   Lab 12/17/23  0534 12/18/23  0435   * 135*    138   K 4.3 4.4    101   CO2 30* 28   BUN 30* 30*   CREATININE 0.8 0.8   CALCIUM 9.3 9.4   , CBC   Recent Labs   Lab 12/17/23  0534 12/18/23  0435   WBC 8.70 7.14   HGB 10.0* 10.2*   HCT 30.4* 31.5*    250   , and All labs within the past 24 hours have been reviewed    Pending Diagnostic Studies:       None           Medications:  Reconciled Home Medications:      Medication List        START taking these medications      aspirin 81 MG Chew  Take 1 tablet (81  mg total) by mouth 2 (two) times daily.     HYDROcodone-acetaminophen 5-325 mg per tablet  Commonly known as: NORCO  Take 1 tablet by mouth every 4 (four) hours as needed for Pain.            CONTINUE taking these medications      calcium carbonate 500 mg calcium (1,250 mg) chewable tablet  Commonly known as: OS-SANDEEP  Take 1 tablet by mouth once daily.     losartan 50 MG tablet  Commonly known as: COZAAR  Take 1 tablet (50 mg total) by mouth once daily.     metFORMIN 500 MG tablet  Commonly known as: GLUCOPHAGE  Take 1 tablet (500 mg total) by mouth 2 (two) times daily with meals.     multivitamin per tablet  Commonly known as: THERAGRAN  Take 1 tablet by mouth once daily.     rosuvastatin 10 MG tablet  Commonly known as: CRESTOR  Take 1 tablet (10 mg total) by mouth once daily.              Indwelling Lines/Drains at time of discharge:   Lines/Drains/Airways       None                   Time spent on the discharge of patient: 50 minutes         Ankit Bueno MD  Department of Hospital Medicine  [facility]

## 2023-12-18 NOTE — PLAN OF CARE
12/18/23 0945   Post-Acute Status   Post-Acute Authorization Placement   Post-Acute Placement Status Pending post-acute provider review/more information requested     Dc sent for review, cm to wait for report information.

## 2023-12-18 NOTE — PT/OT/SLP PROGRESS
Occupational Therapy      Patient Name:  May Solomon   MRN:  [id number]    Patient not seen today secondary to Other (Comment) (prepping for d/c). Will follow-up tomorrow if d/c falls through.    12/18/2023

## 2023-12-18 NOTE — PLAN OF CARE
12/18/23 1429   Final Note   Assessment Type Final Discharge Note   Anticipated Discharge Disposition SNF   What phone number can be called within the next 1-3 days to see how you are doing after discharge? [phone]   Post-Acute Status   Post-Acute Authorization Placement   Post-Acute Placement Status Set-up Complete/Auth obtained   Discharge Delays None known at this time     Pt discharging to [facility] Nurse informed to call repor to [facility] [phone] to send transport van when report completed

## 2023-12-28 ENCOUNTER — TELEPHONE (OUTPATIENT)
Dept: FAMILY MEDICINE | Facility: CLINIC | Age: 73
End: 2023-12-28
Payer: MEDICARE

## 2023-12-28 NOTE — TELEPHONE ENCOUNTER
----- Message from Payton Ramirez sent at 12/28/2023  2:13 PM CST -----  Contact: [facility]  Pt needs to schedule a follow up appt. Pt will be DC'D on 12/31/23. Pt #[phone]. Lucía @[phone] ext. [phone]

## 2023-12-29 ENCOUNTER — OFFICE VISIT (OUTPATIENT)
Dept: ORTHOPEDICS | Facility: CLINIC | Age: 73
End: 2023-12-29
Payer: MEDICARE

## 2023-12-29 VITALS — HEIGHT: 63 IN | WEIGHT: 170 LBS | BODY MASS INDEX: 30.12 KG/M2

## 2023-12-29 DIAGNOSIS — M54.32 SCIATICA OF LEFT SIDE: ICD-10-CM

## 2023-12-29 DIAGNOSIS — Z96.642 STATUS POST HIP REPLACEMENT, LEFT: Primary | ICD-10-CM

## 2023-12-29 PROCEDURE — 3288F FALL RISK ASSESSMENT DOCD: CPT | Mod: CPTII,S$GLB,, | Performed by: PHYSICIAN ASSISTANT

## 2023-12-29 PROCEDURE — 4010F ACE/ARB THERAPY RXD/TAKEN: CPT | Mod: CPTII,S$GLB,, | Performed by: PHYSICIAN ASSISTANT

## 2023-12-29 PROCEDURE — 1160F PR REVIEW ALL MEDS BY PRESCRIBER/CLIN PHARMACIST DOCUMENTED: ICD-10-PCS | Mod: CPTII,S$GLB,, | Performed by: PHYSICIAN ASSISTANT

## 2023-12-29 PROCEDURE — 3044F HG A1C LEVEL LT 7.0%: CPT | Mod: CPTII,S$GLB,, | Performed by: PHYSICIAN ASSISTANT

## 2023-12-29 PROCEDURE — 1125F AMNT PAIN NOTED PAIN PRSNT: CPT | Mod: CPTII,S$GLB,, | Performed by: PHYSICIAN ASSISTANT

## 2023-12-29 PROCEDURE — 1100F PR PT FALLS ASSESS DOC 2+ FALLS/FALL W/INJURY/YR: ICD-10-PCS | Mod: CPTII,S$GLB,, | Performed by: PHYSICIAN ASSISTANT

## 2023-12-29 PROCEDURE — 3066F NEPHROPATHY DOC TX: CPT | Mod: CPTII,S$GLB,, | Performed by: PHYSICIAN ASSISTANT

## 2023-12-29 PROCEDURE — 1100F PTFALLS ASSESS-DOCD GE2>/YR: CPT | Mod: CPTII,S$GLB,, | Performed by: PHYSICIAN ASSISTANT

## 2023-12-29 PROCEDURE — 1159F MED LIST DOCD IN RCRD: CPT | Mod: CPTII,S$GLB,, | Performed by: PHYSICIAN ASSISTANT

## 2023-12-29 PROCEDURE — 99024 POSTOP FOLLOW-UP VISIT: CPT | Mod: S$GLB,,, | Performed by: PHYSICIAN ASSISTANT

## 2023-12-29 PROCEDURE — 3044F PR MOST RECENT HEMOGLOBIN A1C LEVEL <7.0%: ICD-10-PCS | Mod: CPTII,S$GLB,, | Performed by: PHYSICIAN ASSISTANT

## 2023-12-29 PROCEDURE — 1125F PR PAIN SEVERITY QUANTIFIED, PAIN PRESENT: ICD-10-PCS | Mod: CPTII,S$GLB,, | Performed by: PHYSICIAN ASSISTANT

## 2023-12-29 PROCEDURE — 3288F PR FALLS RISK ASSESSMENT DOCUMENTED: ICD-10-PCS | Mod: CPTII,S$GLB,, | Performed by: PHYSICIAN ASSISTANT

## 2023-12-29 PROCEDURE — 99024 PR POST-OP FOLLOW-UP VISIT: ICD-10-PCS | Mod: S$GLB,,, | Performed by: PHYSICIAN ASSISTANT

## 2023-12-29 PROCEDURE — 1160F RVW MEDS BY RX/DR IN RCRD: CPT | Mod: CPTII,S$GLB,, | Performed by: PHYSICIAN ASSISTANT

## 2023-12-29 PROCEDURE — 3066F PR DOCUMENTATION OF TREATMENT FOR NEPHROPATHY: ICD-10-PCS | Mod: CPTII,S$GLB,, | Performed by: PHYSICIAN ASSISTANT

## 2023-12-29 PROCEDURE — 1159F PR MEDICATION LIST DOCUMENTED IN MEDICAL RECORD: ICD-10-PCS | Mod: CPTII,S$GLB,, | Performed by: PHYSICIAN ASSISTANT

## 2023-12-29 PROCEDURE — 3061F NEG MICROALBUMINURIA REV: CPT | Mod: CPTII,S$GLB,, | Performed by: PHYSICIAN ASSISTANT

## 2023-12-29 PROCEDURE — 4010F PR ACE/ARB THEARPY RXD/TAKEN: ICD-10-PCS | Mod: CPTII,S$GLB,, | Performed by: PHYSICIAN ASSISTANT

## 2023-12-29 PROCEDURE — 3061F PR NEG MICROALBUMINURIA RESULT DOCUMENTED/REVIEW: ICD-10-PCS | Mod: CPTII,S$GLB,, | Performed by: PHYSICIAN ASSISTANT

## 2023-12-29 RX ORDER — POLYETHYLENE GLYCOL 3350 17 G/17G
17 POWDER, FOR SOLUTION ORAL
COMMUNITY
Start: 2023-12-21 | End: 2024-01-09

## 2023-12-29 RX ORDER — APIXABAN 2.5 MG/1
2.5 TABLET, FILM COATED ORAL 2 TIMES DAILY
COMMUNITY
Start: 2023-12-21 | End: 2024-01-09

## 2023-12-29 RX ORDER — HYDROCODONE BITARTRATE AND ACETAMINOPHEN 5; 325 MG/1; MG/1
1 TABLET ORAL EVERY 4 HOURS PRN
COMMUNITY
End: 2024-02-21

## 2023-12-29 NOTE — PROGRESS NOTES
[facility]  [address]  [place] [address]  Phone: [phone]   [phone]    Patient's PCP: Irineo Freeman Jr., MD  Referring Provider: No ref. provider found    Subjective:      Chief Complaint:   Chief Complaint   Patient presents with    Left Hip - Post-op Evaluation     PO L. Hip ORIF 12/13/2023 Slight discomfort pain is rarely above a 2. Notes difficultly sleeping at night.       Past Medical History:   Diagnosis Date    Basal cell carcinoma     Diabetes mellitus, type 2     History of colonic polyps     HTN (hypertension)     Hyperlipidemia        Past Surgical History:   Procedure Laterality Date    ARTHROSCOPY OF KNEE Right 2013    BREAST BIOPSY      COLONOSCOPY  05/2017    EYE SURGERY Right 05/2021    CATARACT    HIP REPLACEMENT ARTHROPLASTY Left 12/13/2023    Procedure: ARTHROPLASTY, HIP REPLACEMENT;  Surgeon: Sachin Tong MD;  Location: [facility] OR;  Service: Orthopedics;  Laterality: Left;    KNEE ARTHROSCOPY W/ MENISCECTOMY Left 9/13/2023    Procedure: ARTHROSCOPY, KNEE, WITH MENISCECTOMY;  Surgeon: Sachin Tong MD;  Location: [facility] OR;  Service: Orthopedics;  Laterality: Left;    TRIGGER FINGER RELEASE Left 10/26/2022    Procedure: RELEASE, TRIGGER FINGER;  Surgeon: Sachin Tong MD;  Location: [facility] OR;  Service: Orthopedics;  Laterality: Left;       Current Outpatient Medications   Medication Sig    ELIQUIS 2.5 mg Tab Take 2.5 mg by mouth 2 (two) times daily.    polyethylene glycol (GLYCOLAX) 17 gram/dose powder Take 17 g by mouth.    calcium carbonate (OS-SANDEEP) 500 mg calcium (1,250 mg) chewable tablet Take 1 tablet by mouth once daily.    HYDROcodone-acetaminophen (NORCO) 5-325 mg per tablet Take 1 tablet by mouth every 4 (four) hours as needed.    losartan (COZAAR) 50 MG tablet Take 1 tablet (50 mg total) by mouth once daily.    metFORMIN (GLUCOPHAGE) 500 MG tablet Take 1 tablet (500 mg total) by mouth 2 (two) times daily with meals.    multivitamin (THERAGRAN)  per tablet Take 1 tablet by mouth once daily.    rosuvastatin (CRESTOR) 10 MG tablet Take 1 tablet (10 mg total) by mouth once daily.     No current facility-administered medications for this visit.       Review of patient's allergies indicates:   Allergen Reactions    Sulfa (sulfonamide antibiotics) Rash       Family History   Problem Relation Age of Onset    Colon cancer Mother 76    Hypertension Mother     Obesity Mother     Prostate cancer Father     Breast cancer Neg Hx        Social History     Socioeconomic History    Marital status:    Occupational History    Occupation: retired   Tobacco Use    Smoking status: Never    Smokeless tobacco: Never   Substance and Sexual Activity    Alcohol use: Yes     Comment: occasional    Drug use: Never    Sexual activity: Not Currently   Social History Narrative    Live with son     Social Determinants of Health     Stress: No Stress Concern Present (12/16/2020)    [employer] - Occupational Stress Questionnaire     Feeling of Stress : Only a little       History of present illness:  Ms. Olvera comes in today 2 weeks status post left hip arthroplasty secondary to femoral neck fracture.  She is doing well.  She is at a rehab facility.  Her pain is well controlled.  She is ambulating and weight-bearing as tolerated with her therapist.  She is set to discharge home in 2 days.    Review of Systems:    Constitutional: Negative for chills, fever and weight loss.   HENT: Negative for congestion.    Eyes: Negative for discharge and redness.   Respiratory: Negative for cough and shortness of breath.    Cardiovascular: Negative for chest pain.   Gastrointestinal: Negative for nausea and vomiting.   Musculoskeletal: See HPI.   Skin: Negative for rash.   Neurological: Negative for headaches.   Endo/Heme/Allergies: Does not bruise/bleed easily.   Psychiatric/Behavioral: The patient is not nervous/anxious.    All other systems reviewed and are  negative.       Objective:      Physical Examination:    Vital Signs:  There were no vitals filed for this visit.    Body mass index is 30.11 kg/m².    This a well-developed, well nourished patient in no acute distress.  They are alert and oriented and cooperative to examination.     Left hip exam:  Skin to left hip is clean dry and intact.  No erythema or ecchymosis.  No signs or symptoms of infection.  Left lateral hip incision well healed without wound dehiscence or drainage.  Left calf is soft and nontender.  She can weightbear as tolerated left lower extremity.  She is neurovascularly intact throughout the left lower extremity.    Pertinent New Results:        XRAY Report / Interpretation:   No new radiographs taken on today's clinic visit.      Assessment:       1. Status post hip replacement, left    2. Sciatica of left side      Plan:     Status post hip replacement, left  -     Ambulatory referral/consult to Physical/Occupational Therapy; Future; Expected date: 01/05/2024    Sciatica of left side  -     Ambulatory referral/consult to Physical/Occupational Therapy; Future; Expected date: 01/05/2024        Follow up for 4 week PO Left VI 12.13.23 , PT/OT, Tues/Thurs.    Sutures removed from the left hip today.  She tolerated this well.  She can clean the operative site once a day with warm soapy water and not apply any topical creams or ointments.  She should not submerge operative site underwater in a pool or bathtub type environment for least 1 more week.  She can weightbear as tolerated on the left lower extremity.  Prescription was given today to transition to outpatient physical therapy once discharged from the rehab facility.  I did review with her total hip replacement precautions to help reduce the incidence of dislocation.  She is on Eliquis 2.5 mg twice a day as prescribed by the attending at the rehab facility.  This is more than sufficient for DVT prophylaxis coverage.  She should remain on that  for least 2 months postoperatively.  We will see her back in the office in 4 weeks to assess her postoperative progress with physical therapy.  She does complain of a flare-up of her right-sided sciatica.  We will add that to the physical therapy referral to have them help assist.        Ari Lama, PHU, PA-C    This note was created using [employer] voice recognition software that occasionally misinterprets words or phrases.

## 2024-01-02 ENCOUNTER — TELEPHONE (OUTPATIENT)
Dept: ORTHOPEDICS | Facility: CLINIC | Age: 74
End: 2024-01-02

## 2024-01-02 NOTE — TELEPHONE ENCOUNTER
----- Message from Anusha Rima sent at 1/2/2024  4:23 PM CST -----  Regarding: Handicap Placard  Pt called requesting a Handicap Placard (LA) while she's in her Post Op period. Pt phone #  [phone]. - BARRETT

## 2024-01-09 ENCOUNTER — OFFICE VISIT (OUTPATIENT)
Dept: FAMILY MEDICINE | Facility: CLINIC | Age: 74
End: 2024-01-09
Payer: MEDICARE

## 2024-01-09 ENCOUNTER — CLINICAL SUPPORT (OUTPATIENT)
Dept: REHABILITATION | Facility: HOSPITAL | Age: 74
End: 2024-01-09
Payer: MEDICARE

## 2024-01-09 VITALS
HEART RATE: 105 BPM | OXYGEN SATURATION: 97 % | DIASTOLIC BLOOD PRESSURE: 68 MMHG | TEMPERATURE: 97 F | BODY MASS INDEX: 30.18 KG/M2 | SYSTOLIC BLOOD PRESSURE: 120 MMHG | HEIGHT: 63 IN | WEIGHT: 170.31 LBS

## 2024-01-09 DIAGNOSIS — R26.9 GAIT ABNORMALITY: Primary | ICD-10-CM

## 2024-01-09 DIAGNOSIS — M54.32 SCIATICA OF LEFT SIDE: ICD-10-CM

## 2024-01-09 DIAGNOSIS — Z96.642 STATUS POST HIP REPLACEMENT, LEFT: Primary | ICD-10-CM

## 2024-01-09 DIAGNOSIS — Z96.642 STATUS POST HIP REPLACEMENT, LEFT: ICD-10-CM

## 2024-01-09 DIAGNOSIS — E11.22 TYPE 2 DIABETES MELLITUS WITH STAGE 3A CHRONIC KIDNEY DISEASE, WITHOUT LONG-TERM CURRENT USE OF INSULIN: ICD-10-CM

## 2024-01-09 DIAGNOSIS — N18.31 TYPE 2 DIABETES MELLITUS WITH STAGE 3A CHRONIC KIDNEY DISEASE, WITHOUT LONG-TERM CURRENT USE OF INSULIN: ICD-10-CM

## 2024-01-09 PROBLEM — N84.1 POLYP AT CERVICAL OS: Status: ACTIVE | Noted: 2024-01-09

## 2024-01-09 PROCEDURE — 97161 PT EVAL LOW COMPLEX 20 MIN: CPT | Mod: PO

## 2024-01-09 PROCEDURE — 1100F PTFALLS ASSESS-DOCD GE2>/YR: CPT | Mod: CPTII,S$GLB,, | Performed by: INTERNAL MEDICINE

## 2024-01-09 PROCEDURE — 99999 PR PBB SHADOW E&M-EST. PATIENT-LVL III: CPT | Mod: PBBFAC,,, | Performed by: INTERNAL MEDICINE

## 2024-01-09 PROCEDURE — 3074F SYST BP LT 130 MM HG: CPT | Mod: CPTII,S$GLB,, | Performed by: INTERNAL MEDICINE

## 2024-01-09 PROCEDURE — 1126F AMNT PAIN NOTED NONE PRSNT: CPT | Mod: CPTII,S$GLB,, | Performed by: INTERNAL MEDICINE

## 2024-01-09 PROCEDURE — 99213 OFFICE O/P EST LOW 20 MIN: CPT | Mod: S$GLB,,, | Performed by: INTERNAL MEDICINE

## 2024-01-09 PROCEDURE — 3078F DIAST BP <80 MM HG: CPT | Mod: CPTII,S$GLB,, | Performed by: INTERNAL MEDICINE

## 2024-01-09 PROCEDURE — 97112 NEUROMUSCULAR REEDUCATION: CPT | Mod: PO

## 2024-01-09 PROCEDURE — 1111F DSCHRG MED/CURRENT MED MERGE: CPT | Mod: CPTII,S$GLB,, | Performed by: INTERNAL MEDICINE

## 2024-01-09 PROCEDURE — 3288F FALL RISK ASSESSMENT DOCD: CPT | Mod: CPTII,S$GLB,, | Performed by: INTERNAL MEDICINE

## 2024-01-09 PROCEDURE — 1159F MED LIST DOCD IN RCRD: CPT | Mod: CPTII,S$GLB,, | Performed by: INTERNAL MEDICINE

## 2024-01-09 NOTE — PROGRESS NOTES
Subjective:       Patient ID: May Solomon is a 73 y.o. female.    Chief Complaint: Follow-up (Follow up from rehab)    Here for follow up from hospital and rehab.  She tripped over the trailer hitch on her SUV causing a fall which resulted in a left femoral neck fracture.   She underwent total hip arthroplasty and was sent to rehab for 2 weeks.  While there, she was on eliquis for DVT prophylaxis but that has been stopped since discharge on 12/31.  She started outpatient PT today.   Only change to home medications is norco for pain which she generally has to take twice day.      Review of Systems   Constitutional:  Positive for activity change. Negative for appetite change, chills, diaphoresis, fatigue, fever and unexpected weight change.   HENT:  Negative for congestion, ear discharge, ear pain, hearing loss, mouth sores, nosebleeds, postnasal drip, rhinorrhea, sinus pressure, sinus pain, sneezing, sore throat, tinnitus, trouble swallowing and voice change.    Eyes:  Negative for photophobia, pain, discharge, redness, itching and visual disturbance.   Respiratory:  Negative for apnea, cough, choking, chest tightness, shortness of breath and wheezing.    Cardiovascular:  Negative for chest pain, palpitations and leg swelling.   Gastrointestinal:  Negative for abdominal distention, abdominal pain, blood in stool, constipation, diarrhea, nausea and vomiting.   Endocrine: Negative for cold intolerance, heat intolerance, polydipsia and polyuria.   Genitourinary:  Negative for decreased urine volume, difficulty urinating, dyspareunia, dysuria, enuresis, frequency, genital sores, hematuria, menstrual problem, pelvic pain, urgency, vaginal bleeding, vaginal discharge and vaginal pain.   Musculoskeletal:  Negative for arthralgias, back pain, gait problem, joint swelling, myalgias, neck pain and neck stiffness.   Skin:  Negative for rash and wound.   Allergic/Immunologic: Negative for environmental allergies, food  allergies and immunocompromised state.   Neurological:  Negative for dizziness, tremors, seizures, syncope, facial asymmetry, speech difficulty, weakness, light-headedness, numbness and headaches.   Hematological:  Negative for adenopathy. Does not bruise/bleed easily.   Psychiatric/Behavioral:  Negative for confusion, decreased concentration, dysphoric mood, hallucinations, self-injury, sleep disturbance and suicidal ideas. The patient is not nervous/anxious.        Past Medical History:   Diagnosis Date    Basal cell carcinoma     Diabetes mellitus, type 2     History of colonic polyps     HTN (hypertension)     Hyperlipidemia       Past Surgical History:   Procedure Laterality Date    ARTHROSCOPY OF KNEE Right 2013    BREAST BIOPSY      COLONOSCOPY  05/2017    EYE SURGERY Right 05/2021    CATARACT    HIP REPLACEMENT ARTHROPLASTY Left 12/13/2023    Procedure: ARTHROPLASTY, HIP REPLACEMENT;  Surgeon: Sachin Tong MD;  Location: [facility];  Service: Orthopedics;  Laterality: Left;    KNEE ARTHROSCOPY W/ MENISCECTOMY Left 9/13/2023    Procedure: ARTHROSCOPY, KNEE, WITH MENISCECTOMY;  Surgeon: Sachin Tong MD;  Location: [facility] OR;  Service: Orthopedics;  Laterality: Left;    TRIGGER FINGER RELEASE Left 10/26/2022    Procedure: RELEASE, TRIGGER FINGER;  Surgeon: Sachin Tong MD;  Location: [facility] OR;  Service: Orthopedics;  Laterality: Left;       Family History   Problem Relation Age of Onset    Colon cancer Mother 76    Hypertension Mother     Obesity Mother     Prostate cancer Father     Breast cancer Neg Hx        Social History     Socioeconomic History    Marital status:    Occupational History    Occupation: retired   Tobacco Use    Smoking status: Never    Smokeless tobacco: Never   Substance and Sexual Activity    Alcohol use: Yes     Comment: occasional    Drug use: Never    Sexual activity: Not Currently   Social History Narrative    Live with son     Social Determinants of Health     Financial  Resource Strain: Low Risk  (1/7/2024)    Overall Financial Resource Strain (CARDIA)     Difficulty of Paying Living Expenses: Not hard at all   Food Insecurity: No Food Insecurity (1/7/2024)    Hunger Vital Sign     Worried About Running Out of Food in the Last Year: Never true     Ran Out of Food in the Last Year: Never true   Transportation Needs: No Transportation Needs (1/7/2024)    PRAPARE - Transportation     Lack of Transportation (Medical): No     Lack of Transportation (Non-Medical): No   Physical Activity: Inactive (1/7/2024)    Exercise Vital Sign     Days of Exercise per Week: 0 days     Minutes of Exercise per Session: 0 min   Stress: No Stress Concern Present (1/7/2024)    [employer] - Occupational Stress Questionnaire     Feeling of Stress : Not at all   Social Connections: Unknown (1/7/2024)    Social Connection and Isolation Panel [NHANES]     Frequency of Communication with Friends and Family: More than three times a week     Frequency of Social Gatherings with Friends and Family: Once a week     Active Member of Clubs or Organizations: No     Attends Club or Organization Meetings: Never     Marital Status:    Housing Stability: Low Risk  (1/7/2024)    Housing Stability Vital Sign     Unable to Pay for Housing in the Last Year: No     Number of Places Lived in the Last Year: 1     Unstable Housing in the Last Year: No       Current Outpatient Medications   Medication Sig Dispense Refill    calcium carbonate (OS-SANDEEP) 500 mg calcium (1,250 mg) chewable tablet Take 1 tablet by mouth once daily.      HYDROcodone-acetaminophen (NORCO) 5-325 mg per tablet Take 1 tablet by mouth every 4 (four) hours as needed.      losartan (COZAAR) 50 MG tablet Take 1 tablet (50 mg total) by mouth once daily. 90 tablet 1    metFORMIN (GLUCOPHAGE) 500 MG tablet Take 1 tablet (500 mg total) by mouth 2 (two) times daily with meals. 180 tablet 1    multivitamin (THERAGRAN) per tablet Take  "1 tablet by mouth once daily.      rosuvastatin (CRESTOR) 10 MG tablet Take 1 tablet (10 mg total) by mouth once daily. 90 tablet 1     No current facility-administered medications for this visit.       Review of patient's allergies indicates:   Allergen Reactions    Sulfa (sulfonamide antibiotics) Rash     Objective:    HPI     Follow-up     Additional comments: Follow up from rehab          Last edited by Nelson Rosario MA on 1/9/2024  2:13 PM.      Blood pressure 120/68, pulse 105, temperature 96.6 °F (35.9 °C), temperature source Temporal, height 5' 3" (1.6 m), weight 77.3 kg (170 lb 4.9 oz), SpO2 97 %. Body mass index is 30.17 kg/m².   Physical Exam  Vitals and nursing note reviewed.   Constitutional:       General: She is not in acute distress.     Appearance: Normal appearance. She is obese. She is not ill-appearing, toxic-appearing or diaphoretic.   Cardiovascular:      Rate and Rhythm: Tachycardia present.      Pulses: Normal pulses.      Heart sounds: Murmur heard.   Pulmonary:      Effort: Pulmonary effort is normal. No respiratory distress.      Breath sounds: Normal breath sounds.   Neurological:      Mental Status: She is alert.      Gait: Gait abnormal (walker).             Assessment:       1. Status post hip replacement, left    2. Type 2 diabetes mellitus with stage 3a chronic kidney disease, without long-term current use of insulin        Plan:       May was seen today for follow-up.    Diagnoses and all orders for this visit:    Status post hip replacement, left  Comments:  Continue PT, Ortho follow up    Type 2 diabetes mellitus with stage 3a chronic kidney disease, without long-term current use of insulin  Comments:  Order labs for follow up next month  Orders:  -     Comprehensive Metabolic Panel; Future  -     Hemoglobin A1C; Future  -     Lipid Panel; Future  -     Microalbumin/Creatinine Ratio, Urine; Future  -     Urinalysis; Future  -     TSH; Future  -     CBC Auto Differential; " Future  -     Comprehensive Metabolic Panel  -     Hemoglobin A1C  -     Lipid Panel  -     Microalbumin/Creatinine Ratio, Urine  -     Urinalysis  -     TSH  -     CBC Auto Differential

## 2024-01-09 NOTE — PLAN OF CARE
[facility]   Physical Therapy Initial Evaluation      Name: May Solomon  Clinic Number: [id number]    Therapy Diagnosis:   Encounter Diagnoses   Name Primary?    Status post hip replacement, left     Sciatica of left side     Gait abnormality Yes        Physician: Ari Lama, *    Physician Orders: PT Eval and Treat  2-3 times a week for 4-6 wks.  Medical Diagnosis from Referral: S/P hip replacement , left.  Sciatica of left side.  Evaluation Date: 1/9/2024  Authorization Period Expiration: 12/28/24.  Plan of Care Expiration: 4/12/24  Progress Note Due: 2/8/24  Date of Surgery: 12/13/23 LT VI  Visit # / Visits authorized: 1/ 1   FOTO: 1/ 3 99/100    Precautions: Standard and FallHip. Posterior approach.WBAT.    Time In: 1130  Time Out: 1215  Total Billable Time: 45 minutes    Subjective     Date of onset: 12/12/23 DOI.12/13/23 DOS LT VI     History of current condition - May reports: she tripped and fell in her driveway 12/12/23 and suffered left hip Fx.She underwent left VI 12/13/23 posterior lateral approach f/b 2 wks in [facility] rehab.    Falls: 12/12/23 only.    Imaging: SEE EPIC    Prior Therapy: NH x 2 wks.  Social History: In [address] lives with her son  Occupation: Retired.  Prior Level of Function: Independent.  Current Level of Function: Modified independent.    Pain:  Current 2/10, worst 6/10, best 0/10   Location: left hip.  Description: Aching, Throbbing, Deep, Sharp, and Variable  Aggravating Factors: Standing, Bending, Walking, Extension, Flexing, Lifting, and Getting out of bed/chair  Easing Factors: relaxation, pain medication, lying down, rest, and elevation    Patients goals: Return to previous LOF.     Medical History:   Past Medical History:   Diagnosis Date    Basal cell carcinoma     Diabetes mellitus, type 2     History of colonic polyps     HTN (hypertension)     Hyperlipidemia        Surgical History:   May Solomon  has a past  surgical history that includes Colonoscopy (05/2017); Breast biopsy; Eye surgery (Right, 05/2021); Trigger finger release (Left, 10/26/2022); Arthroscopy of knee (Right, 2013); Knee arthroscopy w/ meniscectomy (Left, 9/13/2023); and Hip replacement arthroplasty (Left, 12/13/2023).    Medications:   May has a current medication list which includes the following prescription(s): calcium carbonate, eliquis, hydrocodone-acetaminophen, losartan, metformin, multivitamin, polyethylene glycol, and rosuvastatin.    Allergies:   Review of patient's allergies indicates:   Allergen Reactions    Sulfa (sulfonamide antibiotics) Rash        Objective      Pt reports no problems with low back and no LBP.    Hip  Right   Left  Pain/Dysfunction with Movement    AROM PROM MMT AROM PROM MMT    Flexion    0 60 2    Extension    5 NT 3- Standing   Abduction    20 30 3-    Adduction    WFL  3    Internal rotation    WFL  3    External rotation    WFL  3      Gait;antalgic,using rolling walker.  Transfers;CGA using RW.  Mat mobility;min A  Axial loading;neg.  SLR LT;60, RT;70 deg.  Palpation;left hip area tender.    Intake Outcome Measure for FOTO HIP Survey    Therapist reviewed FOTO scores for May Solomon on 1/9/2024.   FOTO report - see Media section or FOTO account episode details.    Intake Score: 1%         Treatment     Total Treatment time (time-based codes) separate from Evaluation: 8 minutes     May received the treatments listed below:      neuromuscular re-education activities to improve: Balance, Coordination, Kinesthetic, Sense, Proprioception, and Posture for 8 minutes. The following activities were included:  Nustep 8' L1      Patient Education and Home Exercises     Education provided:   - Role of PT.POC.    Assessment     May is a 73 y.o. female referred to outpatient Physical Therapy with a medical diagnosis of S/P LT VI . Patient presents with ROM and strength deficits,gait abnormality, impared  function due to pain and above listed deficits.    Patient prognosis is Good.   Patient will benefit from skilled outpatient Physical Therapy to address the deficits stated above and in the chart below, provide patient /family education, and to maximize patientt's level of independence.     Plan of care discussed with patient: Yes  Patient's spiritual, cultural and educational needs considered and patient is agreeable to the plan of care and goals as stated below:     Anticipated Barriers for therapy: no    Medical Necessity is demonstrated by the following  History  Co-morbidities and personal factors that may impact the plan of care [x] LOW: no personal factors / co-morbidities  [] MODERATE: 1-2 personal factors / co-morbidities  [] HIGH: 3+ personal factors / co-morbidities    Moderate / High Support Documentation:   Co-morbidities affecting plan of care:     Personal Factors:   age     Examination  Body Structures and Functions, activity limitations and participation restrictions that may impact the plan of care [x] LOW: addressing 1-2 elements  [] MODERATE: 3+ elements  [] HIGH: 4+ elements (please support below)    Moderate / High Support Documentation:      Clinical Presentation [x] LOW: stable  [] MODERATE: Evolving  [] HIGH: Unstable     Decision Making/ Complexity Score: low       Goals:  SHORT TERM GOALS:  6 weeks  Progress Date met   Recent signs and systems trend is improving in order to progress towards Long term goals.  [] Met  [] Not Met  [] Progressing    Patient will be independent with Home Exercise Program  in order to further progress and return to maximal function. [] Met  [] Not Met  [] Progressing    Pain rating at Worst: 5 /10 in order to progress towards increased independence with activity. [] Met  [] Not Met  [] Progressing    Patient will be able to correct postural deviations in sitting and standing, to decrease pain and promote postural awareness for injury prevention.  [] Met  [] Not  Met  [] Progressing    Patient will improve functional outcome (FOTO) score: by 5% to increase self-worth & perceived functional ability towards long term goals [] Met  [] Not Met  [] Progressing      LONG TERM GOALS: 12 weeks  Progress Date met   Patient will return to normal activites of daily living, recreational, and work related activities with less pain and limitation.  [] Met  [] Not Met  [] Progressing    Patient will improve range of motion  to stated goals in order to return to maximal functional potential. ROM of left hip is WNL/WFL in all planes. [] Met  [] Not Met  [] Progressing    Patient will improve Strength to stated goals of appropriate musculature in order to improve functional independence. Strength of left hip 5/5 in all planes. [] Met  [] Not Met  [] Progressing    Pain Rating at Best: 1/10 to improve Quality of Life.  [] Met  [] Not Met  [] Progressing    Patient will meet predicted functional outcome (FOTO) score: 90% to increase self-worth & perceived functional ability.Gait without AD,without abnormalities. [] Met  [] Not Met  [] Progressing    Patient will have met/partially met personal goal of: return to previous LOF, ambulate without AD. [] Met  [] Not Met  [] Progressing         Plan     Plan of care Certification: 1/9/2024 to 4/12/24.    Outpatient Physical Therapy 2 times weekly for 12 weeks to include the following interventions: Electrical Stimulation PRN, Gait Training, Manual Therapy, Moist Heat/ Ice, Neuromuscular Re-ed, Patient Education, Therapeutic Activities, and Therapeutic Exercise.     Sekou Orellana, PT        Physician's Signature: _________________________________________ Date: ________________

## 2024-01-17 ENCOUNTER — CLINICAL SUPPORT (OUTPATIENT)
Dept: REHABILITATION | Facility: HOSPITAL | Age: 74
End: 2024-01-17
Payer: MEDICARE

## 2024-01-17 DIAGNOSIS — R26.9 GAIT ABNORMALITY: Primary | ICD-10-CM

## 2024-01-17 PROCEDURE — 97110 THERAPEUTIC EXERCISES: CPT | Mod: PO,CQ

## 2024-01-17 PROCEDURE — 97530 THERAPEUTIC ACTIVITIES: CPT | Mod: PO,CQ

## 2024-01-17 PROCEDURE — 97112 NEUROMUSCULAR REEDUCATION: CPT | Mod: PO,CQ

## 2024-01-17 NOTE — PROGRESS NOTES
[facility]  Outpatient Physical Therapy Daily Treatment Note      [facility]  [facility] Number: [id number]  Visit Date: 1/17/2024    Therapy Diagnosis:   Encounter Diagnosis   Name Primary?    Gait abnormality Yes       Physician: Ari Lama, *    Physician Orders: PT Eval and Treat  2-3 times a week for 4-6 wks.  Medical Diagnosis from Referral: S/P hip replacement , left.  Sciatica of left side.  Evaluation Date: 1/9/2024  Authorization Period Expiration: 12/28/24.  Plan of Care Expiration: 4/12/24  Progress Note Due: 2/8/24  Date of Surgery: 12/13/23 LT VI  Visit # / Visits authorized: 1/ 20  PTA visit: 1/5    FOTO: 1/ 3 99/100     Precautions: Standard and FallHip. Posterior approach.WBAT.     Time In: 915  Time Out: 1000  Total Billable Time: 45 minutes    Subjective     The patient presents to therapy reporting pain staying about 2/10. She takes pain medication to manage symptoms. She also reported sciatic symptoms     Response to previous treatment: first visit following eval   Functional change: first visit following eval     Pain: 2/10  Location: left hip      Objective     May received therapeutic exercises to develop strength, endurance, ROM, flexibility, posture, and core stabilization for 25 minutes including:    Nutstep 6 min level 2  Standing hip extension and abduction 2 x 10   Long arc quad 2# 2 x 10   Hamstring curls GTB 2 x 10   Bridges 2 x 10 with cues for glute activation   Add squeeze 5 sec hold 2 x 10   Hooklying GTB 2 x 10       May participated in neuromuscular re-education activities to improve: Balance, Coordination, Kinesthetic, Sense, Proprioception, and Posture for 10 minutes. The following activities were included:      Resisted latera walks with GTB 5 ft x   Restisted reto walsk 5 ft x 2     May participated in dynamic functional therapeutic activities to improve functional performance for 10   minutes, including:    Mini squats 2 x 10  Step ups 2 x 10 smallest step     May participated in gait training to improve functional mobility and safety for 0  minutes, including:      Patient Education and HEP     She was not compliant with home exercise program as she has not yet been issued one.    Education provided:   - home exercise program to be provided     Written Home Exercises Provided:  n/a .  Exercises were reviewed and May was able to demonstrate them prior to the end of the session.  May demonstrated  n/a  understanding of the education provided.     See EMR under Patient Instructions for exercises provided  to be issued  .    Assessment     The patient presented to clinic willing to participate with therapy. She reports controled pain levels and the desire to be less dependent on her walker. Patient was able to perform all recommended therapeutic exercises for strength, function, and balance without incident. She is expected to continue to gain strength and improved functional abilities and she progresses towards her goals.     Pt will continue to benefit from skilled outpatient physical therapy to address the deficits listed in the problem list box on initial evaluation, provide pt/family education and to maximize pt's level of independence in the home and community environment.     May Is progressing well towards her goals.   Pt prognosis is Good.     Pt's spiritual, cultural and educational needs considered and pt agreeable to plan of care and goals.    Anticipated barriers to physical therapy: none    Goals: Goals:  SHORT TERM GOALS:  6 weeks  Progress Date met   Recent signs and systems trend is improving in order to progress towards Long term goals.  [] Met  [] Not Met  [] Progressing     Patient will be independent with Home Exercise Program  in order to further progress and return to maximal function. [] Met  [] Not Met  [] Progressing     Pain rating at Worst: 5 /10 in order  to progress towards increased independence with activity. [] Met  [] Not Met  [] Progressing     Patient will be able to correct postural deviations in sitting and standing, to decrease pain and promote postural awareness for injury prevention.  [] Met  [] Not Met  [] Progressing     Patient will improve functional outcome (FOTO) score: by 5% to increase self-worth & perceived functional ability towards long term goals [] Met  [] Not Met  [] Progressing        LONG TERM GOALS: 12 weeks  Progress Date met   Patient will return to normal activites of daily living, recreational, and work related activities with less pain and limitation.  [] Met  [] Not Met  [] Progressing     Patient will improve range of motion  to stated goals in order to return to maximal functional potential. ROM of left hip is WNL/WFL in all planes. [] Met  [] Not Met  [] Progressing     Patient will improve Strength to stated goals of appropriate musculature in order to improve functional independence. Strength of left hip 5/5 in all planes. [] Met  [] Not Met  [] Progressing     Pain Rating at Best: 1/10 to improve Quality of Life.  [] Met  [] Not Met  [] Progressing     Patient will meet predicted functional outcome (FOTO) score: 90% to increase self-worth & perceived functional ability.Gait without AD,without abnormalities. [] Met  [] Not Met  [] Progressing     Patient will have met/partially met personal goal of: return to previous LOF, ambulate without AD. [] Met  [] Not Met  [] Progressing         Plan     Continue with the plan of care established per initial evaluation    Plan of care Certification: 1/9/2024 to 4/12/24.     Outpatient Physical Therapy 2 times weekly for 12 weeks    Melissa Olmos PTA

## 2024-01-18 ENCOUNTER — DOCUMENTATION ONLY (OUTPATIENT)
Dept: REHABILITATION | Facility: HOSPITAL | Age: 74
End: 2024-01-18
Payer: MEDICARE

## 2024-01-25 ENCOUNTER — CLINICAL SUPPORT (OUTPATIENT)
Dept: REHABILITATION | Facility: HOSPITAL | Age: 74
End: 2024-01-25
Payer: MEDICARE

## 2024-01-25 DIAGNOSIS — R26.9 GAIT ABNORMALITY: Primary | ICD-10-CM

## 2024-01-25 PROCEDURE — 97112 NEUROMUSCULAR REEDUCATION: CPT | Mod: PO

## 2024-01-25 PROCEDURE — 97110 THERAPEUTIC EXERCISES: CPT | Mod: PO

## 2024-01-25 PROCEDURE — 97530 THERAPEUTIC ACTIVITIES: CPT | Mod: PO

## 2024-01-25 NOTE — PROGRESS NOTES
[facility]  Outpatient Physical Therapy Daily Treatment Note      [facility]  [facility]: [id number]  Visit Date: 1/25/2024    Therapy Diagnosis:   Encounter Diagnosis   Name Primary?    Gait abnormality Yes       Physician: Ari Lama, *    Physician Orders: PT Eval and Treat  2-3 times a week for 4-6 wks.  Medical Diagnosis from Referral: S/P hip replacement , left.  Sciatica of left side.  Evaluation Date: 1/9/2024  Authorization Period Expiration: 12/28/24.  Plan of Care Expiration: 4/12/24  Progress Note Due: 2/8/24  Date of Surgery: 12/13/23 LT VI  Visit # / Visits authorized: 2/ 20  PTA visit: 1/5    FOTO: 1/ 3 99/100     Precautions: Standard and FallHip. Posterior approach.WBAT.     Time In: 1130  Time Out: 1210  Total Billable Time: 40 minutes    Subjective     The patient presents to therapy reporting pain staying about 2/10. She takes pain medication to manage symptoms. She also reported sciatic symptoms     Response to previous treatment: first visit following eval   Functional change: first visit following eval     Pain: 2/10  Location: left hip      Objective     May received therapeutic exercises to develop strength, endurance, ROM, flexibility, posture, and core stabilization for 10 minutes including:    Nutstep 10 min level 1    May participated in neuromuscular re-education activities to improve: Balance, Coordination, Kinesthetic, Sense, Proprioception, and Posture for 20 minutes. The following activities were included:  // bars  Mini squats 30 airex  Marching 30/30 airex.  HR/TR 30 airex   Standing hip extension and abduction 2 x 10   Long arc quad 2# 2 x 10   Hamstring curls GTB 2 x 10   Bridges 2 x 10 with cues for glute activation   Add squeeze 5 sec hold 2 x 10   Hooklying GTB 2 x 10   SLR 2X10 AA LT  Resisted latera walks with GTB 5 ft x   Restisted reto walsk 5 ft x 2     May participated in  dynamic functional therapeutic activities to improve functional performance for 10  minutes, including:  Side stepping // bars  Gait without AD      May participated in gait training to improve functional mobility and safety for 0  minutes, including:      Patient Education and HEP     She was not compliant with home exercise program as she has not yet been issued one.    Education provided:   - home exercise program to be provided     Written Home Exercises Provided:  n/a .  Exercises were reviewed and May was able to demonstrate them prior to the end of the session.  May demonstrated  n/a  understanding of the education provided.     See EMR under Patient Instructions for exercises provided  to be issued  .    Assessment     The patient presented to clinic willing to participate with therapy. She reports controled pain levels and the desire to be less dependent on her walker. Patient was able to perform all recommended therapeutic exercises for strength, function, and balance without incident. She is expected to continue to gain strength and improved functional abilities and she progresses towards her goals.     Pt will continue to benefit from skilled outpatient physical therapy to address the deficits listed in the problem list box on initial evaluation, provide pt/family education and to maximize pt's level of independence in the home and community environment.     May Is progressing well towards her goals.   Pt prognosis is Good.     Pt's spiritual, cultural and educational needs considered and pt agreeable to plan of care and goals.    Anticipated barriers to physical therapy: none    Goals: Goals:  SHORT TERM GOALS:  6 weeks  Progress Date met   Recent signs and systems trend is improving in order to progress towards Long term goals.  [] Met  [] Not Met  [] Progressing     Patient will be independent with Home Exercise Program  in order to further progress and return to maximal function. []  Met  [] Not Met  [] Progressing     Pain rating at Worst: 5 /10 in order to progress towards increased independence with activity. [] Met  [] Not Met  [] Progressing     Patient will be able to correct postural deviations in sitting and standing, to decrease pain and promote postural awareness for injury prevention.  [] Met  [] Not Met  [] Progressing     Patient will improve functional outcome (FOTO) score: by 5% to increase self-worth & perceived functional ability towards long term goals [] Met  [] Not Met  [] Progressing        LONG TERM GOALS: 12 weeks  Progress Date met   Patient will return to normal activites of daily living, recreational, and work related activities with less pain and limitation.  [] Met  [] Not Met  [] Progressing     Patient will improve range of motion  to stated goals in order to return to maximal functional potential. ROM of left hip is WNL/WFL in all planes. [] Met  [] Not Met  [] Progressing     Patient will improve Strength to stated goals of appropriate musculature in order to improve functional independence. Strength of left hip 5/5 in all planes. [] Met  [] Not Met  [] Progressing     Pain Rating at Best: 1/10 to improve Quality of Life.  [] Met  [] Not Met  [] Progressing     Patient will meet predicted functional outcome (FOTO) score: 90% to increase self-worth & perceived functional ability.Gait without AD,without abnormalities. [] Met  [] Not Met  [] Progressing     Patient will have met/partially met personal goal of: return to previous LOF, ambulate without AD. [] Met  [] Not Met  [] Progressing         Plan     Continue with the plan of care established per initial evaluation    Plan of care Certification: 1/9/2024 to 4/12/24.     Outpatient Physical Therapy 2 times weekly for 12 weeks    Sekou Orellana, PT

## 2024-01-29 ENCOUNTER — CLINICAL SUPPORT (OUTPATIENT)
Dept: REHABILITATION | Facility: HOSPITAL | Age: 74
End: 2024-01-29
Payer: MEDICARE

## 2024-01-29 DIAGNOSIS — R26.9 GAIT ABNORMALITY: Primary | ICD-10-CM

## 2024-01-29 PROCEDURE — 97112 NEUROMUSCULAR REEDUCATION: CPT | Mod: PO,CQ

## 2024-01-29 PROCEDURE — 97110 THERAPEUTIC EXERCISES: CPT | Mod: PO,CQ

## 2024-01-29 NOTE — PROGRESS NOTES
[facility]  Outpatient Physical Therapy Daily Treatment Note      [facility]  [facility] Number: [id number]  Visit Date: 1/29/2024    Therapy Diagnosis:   Encounter Diagnosis   Name Primary?    Gait abnormality Yes       Physician: Ari Lama, *    Physician Orders: PT Eval and Treat  2-3 times a week for 4-6 wks.  Medical Diagnosis from Referral: S/P hip replacement , left.  Sciatica of left side.  Evaluation Date: 1/9/2024  Authorization Period Expiration: 12/28/24.  Plan of Care Expiration: 4/12/24  Progress Note Due: 2/8/24  Date of Surgery: 12/13/23 LT VI  Visit # / Visits authorized: 3/ 20  PTA visit: 1/5    FOTO: 1/ 3 99/100     Precautions: Standard and FallHip. Posterior approach.WBAT.     Time In: 145  Time Out: 230  Total Billable Time: 45 minutes    Subjective     The patient  reports she has stopped taking prescription meds for pain. She is able to manage symptoms with over the counter meds.     Response to previous treatment: no soreness   Functional change: decreased dependence on prescription meds     Pain: 2/10  Location: left hip      Objective     May received therapeutic exercises to develop strength, endurance, ROM, flexibility, posture, and core stabilization for 10 minutes including:    Nutstep 10 min level 1    May participated in neuromuscular re-education activities to improve: Balance, Coordination, Kinesthetic, Sense, Proprioception, and Posture for 35 minutes. The following activities were included:  // bars  Mini squats 30 airex  Marching 30/30 airex.  HR/TR 30 airex   Standing hip extension and abduction 2 x 10  Standing hamstring curls 2 x 15    short arc quad 2# 2 x 10 with emphasis on full knee extension on left lower extremity 3 sec hold  Bridges 3 x 10 with cues for glute activation   SLR 2X10 AA LT  Resisted latera walks with GTB 5 ft x   Restisted reto walks 5 ft x 2     May participated  in dynamic functional therapeutic activities to improve functional performance for 0  minutes, including:  Side stepping // bars  Gait without AD      May participated in gait training to improve functional mobility and safety for 0  minutes, including:      Patient Education and HEP     She was not compliant with home exercise program as she has not yet been issued one.    Education provided:   - home exercise program to be provided     Written Home Exercises Provided: yes.  Exercises were reviewed and May was able to demonstrate them prior to the end of the session.  May demonstrated good  understanding of the education provided.     See EMR under Patient Instructions for exercises provided  1/29/24 .    Assessment     The patient presented to clinic willing to participate with therapy. Patient was able to perform all recommended therapeutic exercises for strength, function, and balance without incident. Home exercise program was issued for standing exercise to advance the seated exercises she is already doing at home. She is expected to continue to gain strength and improved functional abilities and she progresses towards her goals.     Pt will continue to benefit from skilled outpatient physical therapy to address the deficits listed in the problem list box on initial evaluation, provide pt/family education and to maximize pt's level of independence in the home and community environment.     May Is progressing well towards her goals.   Pt prognosis is Good.     Pt's spiritual, cultural and educational needs considered and pt agreeable to plan of care and goals.    Anticipated barriers to physical therapy: none    Goals: Goals:  SHORT TERM GOALS:  6 weeks  Progress Date met   Recent signs and systems trend is improving in order to progress towards Long term goals.  [] Met  [] Not Met  [] Progressing     Patient will be independent with Home Exercise Program  in order to further progress and return  to maximal function. [] Met  [] Not Met  [] Progressing     Pain rating at Worst: 5 /10 in order to progress towards increased independence with activity. [] Met  [] Not Met  [] Progressing     Patient will be able to correct postural deviations in sitting and standing, to decrease pain and promote postural awareness for injury prevention.  [] Met  [] Not Met  [] Progressing     Patient will improve functional outcome (FOTO) score: by 5% to increase self-worth & perceived functional ability towards long term goals [] Met  [] Not Met  [] Progressing        LONG TERM GOALS: 12 weeks  Progress Date met   Patient will return to normal activites of daily living, recreational, and work related activities with less pain and limitation.  [] Met  [] Not Met  [] Progressing     Patient will improve range of motion  to stated goals in order to return to maximal functional potential. ROM of left hip is WNL/WFL in all planes. [] Met  [] Not Met  [] Progressing     Patient will improve Strength to stated goals of appropriate musculature in order to improve functional independence. Strength of left hip 5/5 in all planes. [] Met  [] Not Met  [] Progressing     Pain Rating at Best: 1/10 to improve Quality of Life.  [] Met  [] Not Met  [] Progressing     Patient will meet predicted functional outcome (FOTO) score: 90% to increase self-worth & perceived functional ability.Gait without AD,without abnormalities. [] Met  [] Not Met  [] Progressing     Patient will have met/partially met personal goal of: return to previous LOF, ambulate without AD. [] Met  [] Not Met  [] Progressing         Plan     Continue with the plan of care established per initial evaluation    Plan of care Certification: 1/9/2024 to 4/12/24.     Outpatient Physical Therapy 2 times weekly for 12 weeks    Melissa Olmos PTA

## 2024-01-30 ENCOUNTER — OFFICE VISIT (OUTPATIENT)
Dept: ORTHOPEDICS | Facility: CLINIC | Age: 74
End: 2024-01-30
Payer: MEDICARE

## 2024-01-30 VITALS — BODY MASS INDEX: 30.12 KG/M2 | WEIGHT: 170 LBS | HEIGHT: 63 IN

## 2024-01-30 DIAGNOSIS — Z96.642 STATUS POST HIP REPLACEMENT, LEFT: Primary | ICD-10-CM

## 2024-01-30 PROCEDURE — 1100F PTFALLS ASSESS-DOCD GE2>/YR: CPT | Mod: CPTII,S$GLB,, | Performed by: ORTHOPAEDIC SURGERY

## 2024-01-30 PROCEDURE — 1160F RVW MEDS BY RX/DR IN RCRD: CPT | Mod: CPTII,S$GLB,, | Performed by: ORTHOPAEDIC SURGERY

## 2024-01-30 PROCEDURE — 1159F MED LIST DOCD IN RCRD: CPT | Mod: CPTII,S$GLB,, | Performed by: ORTHOPAEDIC SURGERY

## 2024-01-30 PROCEDURE — 1126F AMNT PAIN NOTED NONE PRSNT: CPT | Mod: CPTII,S$GLB,, | Performed by: ORTHOPAEDIC SURGERY

## 2024-01-30 PROCEDURE — 3288F FALL RISK ASSESSMENT DOCD: CPT | Mod: CPTII,S$GLB,, | Performed by: ORTHOPAEDIC SURGERY

## 2024-01-30 PROCEDURE — 99024 POSTOP FOLLOW-UP VISIT: CPT | Mod: S$GLB,,, | Performed by: ORTHOPAEDIC SURGERY

## 2024-01-30 NOTE — PROGRESS NOTES
[facility]    Subjective:     Chief Complaint:   Chief Complaint   Patient presents with    Left Hip - Post-op Evaluation     PO left hip 12/13/23 States no current pain has discontinued use of post op pain medication        Past Medical History:   Diagnosis Date    Basal cell carcinoma     Diabetes mellitus, type 2     History of colonic polyps     HTN (hypertension)     Hyperlipidemia        Past Surgical History:   Procedure Laterality Date    ARTHROSCOPY OF KNEE Right 2013    BREAST BIOPSY      COLONOSCOPY  05/2017    EYE SURGERY Right 05/2021    CATARACT    HIP REPLACEMENT ARTHROPLASTY Left 12/13/2023    Procedure: ARTHROPLASTY, HIP REPLACEMENT;  Surgeon: Sachin Tong MD;  Location: [facility];  Service: Orthopedics;  Laterality: Left;    KNEE ARTHROSCOPY W/ MENISCECTOMY Left 9/13/2023    Procedure: ARTHROSCOPY, KNEE, WITH MENISCECTOMY;  Surgeon: Sachin Tong MD;  Location: [facility] OR;  Service: Orthopedics;  Laterality: Left;    TRIGGER FINGER RELEASE Left 10/26/2022    Procedure: RELEASE, TRIGGER FINGER;  Surgeon: Sachin Tong MD;  Location: [facility] OR;  Service: Orthopedics;  Laterality: Left;       Current Outpatient Medications   Medication Sig    calcium carbonate (OS-SANDEEP) 500 mg calcium (1,250 mg) chewable tablet Take 1 tablet by mouth once daily.    HYDROcodone-acetaminophen (NORCO) 5-325 mg per tablet Take 1 tablet by mouth every 4 (four) hours as needed.    losartan (COZAAR) 50 MG tablet Take 1 tablet (50 mg total) by mouth once daily.    metFORMIN (GLUCOPHAGE) 500 MG tablet Take 1 tablet (500 mg total) by mouth 2 (two) times daily with meals.    multivitamin (THERAGRAN) per tablet Take 1 tablet by mouth once daily.    rosuvastatin (CRESTOR) 10 MG tablet Take 1 tablet (10 mg total) by mouth once daily.     No current facility-administered medications for this visit.       Review of patient's allergies indicates:   Allergen Reactions    Sulfa (sulfonamide antibiotics) Rash       Family History    Problem Relation Age of Onset    Colon cancer Mother 76    Hypertension Mother     Obesity Mother     Prostate cancer Father     Breast cancer Neg Hx        Social History     Socioeconomic History    Marital status:    Occupational History    Occupation: retired   Tobacco Use    Smoking status: Never    Smokeless tobacco: Never   Substance and Sexual Activity    Alcohol use: Yes     Comment: occasional    Drug use: Never    Sexual activity: Not Currently   Social History Narrative    Live with son     Social Determinants of Health     Financial Resource Strain: Low Risk  (1/7/2024)    Overall Financial Resource Strain (CARDIA)     Difficulty of Paying Living Expenses: Not hard at all   Food Insecurity: No Food Insecurity (1/7/2024)    Hunger Vital Sign     Worried About Running Out of Food in the Last Year: Never true     Ran Out of Food in the Last Year: Never true   Transportation Needs: No Transportation Needs (1/7/2024)    PRAPARE - Transportation     Lack of Transportation (Medical): No     Lack of Transportation (Non-Medical): No   Physical Activity: Inactive (1/7/2024)    Exercise Vital Sign     Days of Exercise per Week: 0 days     Minutes of Exercise per Session: 0 min   Stress: No Stress Concern Present (1/7/2024)    [employer] [place] [employer] - Occupational Stress Questionnaire     Feeling of Stress : Not at all   Social Connections: Unknown (1/7/2024)    Social Connection and Isolation Panel [NHANES]     Frequency of Communication with Friends and Family: More than three times a week     Frequency of Social Gatherings with Friends and Family: Once a week     Active Member of Clubs or Organizations: No     Attends Club or Organization Meetings: Never     Marital Status:    Housing Stability: Low Risk  (1/7/2024)    Housing Stability Vital Sign     Unable to Pay for Housing in the Last Year: No     Number of Places Lived in the Last Year: 1     Unstable Housing in the Last  Year: No       History of present illness:  Ms. Olvera comes in today for follow-up for left total hip arthroplasty.  She is 6 weeks postop and very well.  Minimal pain.  She is much improved now from where she was preoperatively.    Review of Systems:    Constitution: Negative for chills, fever, and sweats.  Negative for unexplained weight loss.    HENT:  Negative for headaches and blurry vision.    Cardiovascular:Negative for chest pain or irregular heart beat. Negative for hypertension.    Respiratory:  Negative for cough and shortness of breath.    Gastrointestinal: Negative for abdominal pain, heartburn, melena, nausea, and vomitting.    Genitourinary:  Negative bladder incontinence and dysuria.    Musculoskeletal:  See HPI for details.     Neurological: Negative for numbness.    Psychiatric/Behavioral: Negative for depression.  The patient is not nervous/anxious.      Endocrine: Negative for polyuria    Hematologic/Lymphatic: Negative for bleeding problem.  Does not bruise/bleed easily.    Skin: Negative for poor would healing and rash    Objective:      Physical Examination:    Vital Signs:  There were no vitals filed for this visit.    Body mass index is 30.11 kg/m².    This a well-developed, well nourished patient in no acute distress.  They are alert and oriented and cooperative to examination.        Left hip exam:  Skin to left hip is clean intact.  No erythema or ecchymosis.  No signs or symptoms of infection.  Left lateral hip incision well healed without wound dehiscence or drainage.  Left calf is soft and nontender.  She is neurovascularly intact throughout the left lower extremity.  She can weightbear as tolerated left lower extremity.  She continues to ambulate with a rolling walker.    Pertinent New Results:    XRAY Report / Interpretation:   Single AP view was taken of the left hip today.  No acute fractures or dislocations seen.  Visualized soft tissues appear unremarkable.  She has an  "anatomically placed left total hip arthroplasty without evidence of hardware failure or subsidence.    Assessment/Plan:      1. Status post left total hip arthroplasty.      Continue with her rehabilitation regimen.  We will check her back 2 months to assess her postoperative progress.    Ari Lama, Physician Assistant, served in the capacity as a "scribe" for this patient encounter.  A "face-to-face" encounter occurred with Dr. Sachin Tong on this date.  The treatment plan and medical decision-making is outlined above. Patient was seen and examined with a chaperone.       This note was created using Dragon voice recognition software that occasionally misinterpreted phrases or words.          "

## 2024-01-31 ENCOUNTER — CLINICAL SUPPORT (OUTPATIENT)
Dept: REHABILITATION | Facility: HOSPITAL | Age: 74
End: 2024-01-31
Payer: MEDICARE

## 2024-01-31 DIAGNOSIS — R26.9 GAIT ABNORMALITY: Primary | ICD-10-CM

## 2024-01-31 PROCEDURE — 97110 THERAPEUTIC EXERCISES: CPT | Mod: PO

## 2024-01-31 PROCEDURE — 97112 NEUROMUSCULAR REEDUCATION: CPT | Mod: PO

## 2024-01-31 NOTE — PROGRESS NOTES
[facility]  Outpatient Physical Therapy Daily Treatment Note      [facility]  [facility] Number: [id number]  Visit Date: 1/31/2024    Therapy Diagnosis:   Encounter Diagnosis   Name Primary?    Gait abnormality Yes       Physician: Ari Lmaa, *    Physician Orders: PT Eval and Treat  2-3 times a week for 4-6 wks.  Medical Diagnosis from Referral: S/P hip replacement , left.  Sciatica of left side.  Evaluation Date: 1/9/2024  Authorization Period Expiration: 12/28/24.  Plan of Care Expiration: 4/12/24  Progress Note Due: 2/8/24  Date of Surgery: 12/13/23 LT VI  Visit # / Visits authorized: 3/ 20  PTA visit: 1/5    FOTO: 1/ 3 99/100     Precautions: Standard and FallHip. Posterior approach.WBAT.     Time In: 1343  Time Out: 1423  Total Billable Time: 40 minutes    Subjective     The patient  reports she has stopped taking prescription meds for pain. She is able to manage symptoms with over the counter meds.     Response to previous treatment: no soreness   Functional change: decreased dependence on prescription meds,gait improving.     Pain: 0/10  Location: left hip      Objective     May received therapeutic exercises to develop strength, endurance, ROM, flexibility, posture, and core stabilization for 10 minutes including:    Nutstep 10 min level 1    May participated in neuromuscular re-education activities to improve: Balance, Coordination, Kinesthetic, Sense, Proprioception, and Posture for 30 minutes. The following activities were included:  // bars  Mini squats 30 airex  Marching 30/30 airex.  HR/TR 30 airex   Standing hip extension and abduction 2 x 10  Standing hamstring curls 2 x 15    short arc quad 2# 2 x 10 with emphasis on full knee extension on left lower extremity 3 sec hold  Bridges 3 x 10 with cues for glute activation   SLR 3X10 AROM LT   latera walks 4X10'.    Restisted reto walks 5 ft x 2 NP    May  participated in dynamic functional therapeutic activities to improve functional performance for 0  minutes, including:  Side stepping // bars  Gait without AD      May participated in gait training to improve functional mobility and safety for 0  minutes, including:      Patient Education and HEP     She was not compliant with home exercise program as she has not yet been issued one.    Education provided:   - home exercise program to be provided     Written Home Exercises Provided: yes.  Exercises were reviewed and May was able to demonstrate them prior to the end of the session.  May demonstrated good  understanding of the education provided.     See EMR under Patient Instructions for exercises provided  1/29/24 .    Assessment     The patient presented to clinic willing to participate with therapy. Patient was able to perform all recommended therapeutic exercises for strength, function, and balance without incident. Home exercise program was issued for standing exercise to advance the seated exercises she is already doing at home. She is expected to continue to gain strength and improved functional abilities and she progresses towards her goals.   Strength improving.  Pt will continue to benefit from skilled outpatient physical therapy to address the deficits listed in the problem list box on initial evaluation, provide pt/family education and to maximize pt's level of independence in the home and community environment.     May Is progressing well towards her goals.   Pt prognosis is Good.     Pt's spiritual, cultural and educational needs considered and pt agreeable to plan of care and goals.    Anticipated barriers to physical therapy: none    Goals: Goals:  SHORT TERM GOALS:  6 weeks  Progress Date met   Recent signs and systems trend is improving in order to progress towards Long term goals.  [] Met  [] Not Met  [] Progressing     Patient will be independent with Home Exercise Program  in  order to further progress and return to maximal function. [] Met  [] Not Met  [] Progressing     Pain rating at Worst: 5 /10 in order to progress towards increased independence with activity. [] Met  [] Not Met  [] Progressing     Patient will be able to correct postural deviations in sitting and standing, to decrease pain and promote postural awareness for injury prevention.  [] Met  [] Not Met  [] Progressing     Patient will improve functional outcome (FOTO) score: by 5% to increase self-worth & perceived functional ability towards long term goals [] Met  [] Not Met  [] Progressing        LONG TERM GOALS: 12 weeks  Progress Date met   Patient will return to normal activites of daily living, recreational, and work related activities with less pain and limitation.  [] Met  [] Not Met  [] Progressing     Patient will improve range of motion  to stated goals in order to return to maximal functional potential. ROM of left hip is WNL/WFL in all planes. [] Met  [] Not Met  [] Progressing     Patient will improve Strength to stated goals of appropriate musculature in order to improve functional independence. Strength of left hip 5/5 in all planes. [] Met  [] Not Met  [] Progressing     Pain Rating at Best: 1/10 to improve Quality of Life.  [] Met  [] Not Met  [] Progressing     Patient will meet predicted functional outcome (FOTO) score: 90% to increase self-worth & perceived functional ability.Gait without AD,without abnormalities. [] Met  [] Not Met  [] Progressing     Patient will have met/partially met personal goal of: return to previous LOF, ambulate without AD. [] Met  [] Not Met  [] Progressing         Plan     Continue with the plan of care established per initial evaluation    Plan of care Certification: 1/9/2024 to 4/12/24.     Outpatient Physical Therapy 2 times weekly for 12 weeks    Sekou Orellana, PT

## 2024-02-05 ENCOUNTER — CLINICAL SUPPORT (OUTPATIENT)
Dept: REHABILITATION | Facility: HOSPITAL | Age: 74
End: 2024-02-05
Payer: MEDICARE

## 2024-02-05 DIAGNOSIS — R26.9 GAIT ABNORMALITY: Primary | ICD-10-CM

## 2024-02-05 PROCEDURE — 97116 GAIT TRAINING THERAPY: CPT | Mod: PO,CQ

## 2024-02-05 PROCEDURE — 97112 NEUROMUSCULAR REEDUCATION: CPT | Mod: PO,CQ

## 2024-02-05 NOTE — PROGRESS NOTES
[facility]  Outpatient Physical Therapy Daily Treatment Note      [facility]  [id number]  Visit Date: 2/5/2024    Therapy Diagnosis:   Encounter Diagnosis   Name Primary?    Gait abnormality Yes       Physician: Ari Lama, *    Physician Orders: PT Eval and Treat  2-3 times a week for 4-6 wks.  Medical Diagnosis from Referral: S/P hip replacement , left.  Sciatica of left side.  Evaluation Date: 1/9/2024  Authorization Period Expiration: 12/28/24.  Plan of Care Expiration: 4/12/24  Progress Note Due: 2/8/24  Date of Surgery: 12/13/23 LT VI  Visit # / Visits authorized: 4/ 20  PTA visit: 1/5    FOTO: 1/ 3 99/100     Precautions: Standard and FallHip. Posterior approach.WBAT.     Time In: 1345  Time Out: 1430  Total Billable Time: 45 minutes    Subjective     The patient  reports mild soreness after last treatment     Response to previous treatment: min soreness   Functional change: decreased dependence on prescription meds,gait improving.     Pain: 0/10  Location: left hip      Objective     May received therapeutic exercises to develop strength, endurance, ROM, flexibility, posture, and core stabilization for 6 minutes including:    Nutstep 6 min level 1    May participated in neuromuscular re-education activities to improve: Balance, Coordination, Kinesthetic, Sense, Proprioception, and Posture for 30 minutes. The following activities were included:  // bars  Mini squats 30 airex  Marching 30/30 airex.  HR/TR 30 airex   Standing hip extension and abduction 2 x 10  Standing hamstring curls 2 x 15  3# weight today   short arc quad 3# 2 x 10 with emphasis on full knee extension on left lower extremity 3 sec hold  Bridges 3 x 10 with cues for glute activation   SLR 3X10 AROM LT Not performed today    latera walks 4X10'.    Restisted reto walks 5 ft x 2 NP    May participated in dynamic functional therapeutic  activities to improve functional performance for 0  minutes, including:  Side stepping // bars  Gait without AD      May participated in gait training to improve functional mobility and safety for 8  minutes, includin ft with NBQC moderate cues for gait mechanics and management of Assistive device.       Patient Education and HEP     She was not compliant with home exercise program as she has not yet been issued one.    Education provided:   - home exercise program to be provided     Written Home Exercises Provided: yes.  Exercises were reviewed and May was able to demonstrate them prior to the end of the session.  May demonstrated good  understanding of the education provided.     See EMR under Patient Instructions for exercises provided  24 .    Assessment     The patient presented to clinic willing to participate with therapy. Patient was able to perform all recommended therapeutic exercises for strength, function, and balance without incident. Home exercise program was issued for standing exercise to advance the seated exercises she is already doing at home. She is expected to continue to gain strength and improved functional abilities and she progresses towards her goals.   Strength improving.  Pt will continue to benefit from skilled outpatient physical therapy to address the deficits listed in the problem list box on initial evaluation, provide pt/family education and to maximize pt's level of independence in the home and community environment.     May Is progressing well towards her goals.   Pt prognosis is Good.     Pt's spiritual, cultural and educational needs considered and pt agreeable to plan of care and goals.    Anticipated barriers to physical therapy: none    Goals: Goals:  SHORT TERM GOALS:  6 weeks  Progress Date met   Recent signs and systems trend is improving in order to progress towards Long term goals.  [] Met  [] Not Met  [] Progressing     Patient will be  independent with Home Exercise Program  in order to further progress and return to maximal function. [] Met  [] Not Met  [] Progressing     Pain rating at Worst: 5 /10 in order to progress towards increased independence with activity. [] Met  [] Not Met  [] Progressing     Patient will be able to correct postural deviations in sitting and standing, to decrease pain and promote postural awareness for injury prevention.  [] Met  [] Not Met  [] Progressing     Patient will improve functional outcome (FOTO) score: by 5% to increase self-worth & perceived functional ability towards long term goals [] Met  [] Not Met  [] Progressing        LONG TERM GOALS: 12 weeks  Progress Date met   Patient will return to normal activites of daily living, recreational, and work related activities with less pain and limitation.  [] Met  [] Not Met  [] Progressing     Patient will improve range of motion  to stated goals in order to return to maximal functional potential. ROM of left hip is WNL/WFL in all planes. [] Met  [] Not Met  [] Progressing     Patient will improve Strength to stated goals of appropriate musculature in order to improve functional independence. Strength of left hip 5/5 in all planes. [] Met  [] Not Met  [] Progressing     Pain Rating at Best: 1/10 to improve Quality of Life.  [] Met  [] Not Met  [] Progressing     Patient will meet predicted functional outcome (FOTO) score: 90% to increase self-worth & perceived functional ability.Gait without AD,without abnormalities. [] Met  [] Not Met  [] Progressing     Patient will have met/partially met personal goal of: return to previous LOF, ambulate without AD. [] Met  [] Not Met  [] Progressing         Plan     Continue with the plan of care established per initial evaluation    Plan of care Certification: 1/9/2024 to 4/12/24.     Outpatient Physical Therapy 2 times weekly for 12 weeks    Melissa Olmos PTA

## 2024-02-07 ENCOUNTER — CLINICAL SUPPORT (OUTPATIENT)
Dept: REHABILITATION | Facility: HOSPITAL | Age: 74
End: 2024-02-07
Payer: MEDICARE

## 2024-02-07 DIAGNOSIS — R26.9 GAIT ABNORMALITY: Primary | ICD-10-CM

## 2024-02-07 PROCEDURE — 97110 THERAPEUTIC EXERCISES: CPT | Mod: PO

## 2024-02-07 PROCEDURE — 97112 NEUROMUSCULAR REEDUCATION: CPT | Mod: PO

## 2024-02-07 NOTE — PROGRESS NOTES
[facility]  Outpatient Physical Therapy Daily Treatment Note      [facility]  [facility] Number: [id number]  Visit Date: 2/7/2024    Therapy Diagnosis:   Encounter Diagnosis   Name Primary?    Gait abnormality Yes       Physician: Ari Lama, *    Physician Orders: PT Eval and Treat  2-3 times a week for 4-6 wks.  Medical Diagnosis from Referral: S/P hip replacement , left.  Sciatica of left side.  Evaluation Date: 1/9/2024  Authorization Period Expiration: 12/28/24.  Plan of Care Expiration: 4/12/24  Progress Note Due: 2/8/24  Date of Surgery: 12/13/23 LT VI  Visit # / Visits authorized: 4/ 20  PTA visit: 0/5    FOTO: 1/ 3 99/100     Precautions: Standard and FallHip. Posterior approach.WBAT.     Time In: 1045  Time Out: 1125  Total Billable Time: 40 minutes    Subjective     The patient  reports mild soreness after last treatment     Response to previous treatment: min soreness   Functional change: decreased dependence on prescription meds,gait improving.     Pain: 0/10  Location: left hip      Objective     May received therapeutic exercises to develop strength, endurance, ROM, flexibility, posture, and core stabilization for 10 minutes including:    Nutstep 10 min level 1    May participated in neuromuscular re-education activities to improve: Balance, Coordination, Kinesthetic, Sense, Proprioception, and Posture for 30 minutes. The following activities were included:  // bars  Mini squats 30 airex  Marching 30/30 airex.  HR/TR 30 airex   Standing hip extension and abduction 2 x 10  Standing hamstring curls 2 x 15  3# weight today   short arc quad 3# 2 x 10 with emphasis on full knee extension on left lower extremity 3 sec hold  Clams RTB 30  Hip ADD 30 w/ball.  Bridges 3 x 10 with cues for glute activation   SLR 3X10 AROM LT    Lateral  walks 4X10'.    Restisted reto walks 5 ft x 2 NP  Gait without AD.  May participated  in dynamic functional therapeutic activities to improve functional performance for 0  minutes, including:  Side stepping // bars  Gait without ' CGA      May participated in gait training to improve functional mobility and safety for  minutes, includin ft with NBQC moderate cues for gait mechanics and management of Assistive device.       Patient Education and HEP     She was not compliant with home exercise program as she has not yet been issued one.    Education provided:   - home exercise program to be provided     Written Home Exercises Provided: yes.  Exercises were reviewed and May was able to demonstrate them prior to the end of the session.  May demonstrated good  understanding of the education provided.     See EMR under Patient Instructions for exercises provided  24 .    Assessment     The patient presented to clinic willing to participate with therapy. Patient was able to perform all recommended therapeutic exercises for strength, function, and balance without incident. Home exercise program was issued for standing exercise to advance the seated exercises she is already doing at home. She is expected to continue to gain strength and improved functional abilities and she progresses towards her goals.   Strength improving.  Pt will continue to benefit from skilled outpatient physical therapy to address the deficits listed in the problem list box on initial evaluation, provide pt/family education and to maximize pt's level of independence in the home and community environment.     May Is progressing well towards her goals.   Pt prognosis is Good.     Pt's spiritual, cultural and educational needs considered and pt agreeable to plan of care and goals.    Anticipated barriers to physical therapy: none    Goals: Goals:  SHORT TERM GOALS:  6 weeks  Progress Date met   Recent signs and systems trend is improving in order to progress towards Long term goals.  [] Met  [] Not  Met  [x] Progressing     Patient will be independent with Home Exercise Program  in order to further progress and return to maximal function. [] Met  [] Not Met  [x] Progressing     Pain rating at Worst: 5 /10 in order to progress towards increased independence with activity. [] Met  [] Not Met  [x] Progressing     Patient will be able to correct postural deviations in sitting and standing, to decrease pain and promote postural awareness for injury prevention.  [] Met  [] Not Met  [x] Progressing     Patient will improve functional outcome (FOTO) score: by 5% to increase self-worth & perceived functional ability towards long term goals [] Met  [] Not Met  [x] Progressing        LONG TERM GOALS: 12 weeks  Progress Date met   Patient will return to normal activites of daily living, recreational, and work related activities with less pain and limitation.  [] Met  [] Not Met  [] Progressing     Patient will improve range of motion  to stated goals in order to return to maximal functional potential. ROM of left hip is WNL/WFL in all planes. [] Met  [] Not Met  [] Progressing     Patient will improve Strength to stated goals of appropriate musculature in order to improve functional independence. Strength of left hip 5/5 in all planes. [] Met  [] Not Met  [] Progressing     Pain Rating at Best: 1/10 to improve Quality of Life.  [] Met  [] Not Met  [] Progressing     Patient will meet predicted functional outcome (FOTO) score: 90% to increase self-worth & perceived functional ability.Gait without AD,without abnormalities. [] Met  [] Not Met  [] Progressing     Patient will have met/partially met personal goal of: return to previous LOF, ambulate without AD. [] Met  [] Not Met  [] Progressing         Plan     Continue with the plan of care established per initial evaluation    Plan of care Certification: 1/9/2024 to 4/12/24.     Outpatient Physical Therapy 2 times weekly for 12 weeks    Sekou Orellana, PT

## 2024-02-14 ENCOUNTER — CLINICAL SUPPORT (OUTPATIENT)
Dept: REHABILITATION | Facility: HOSPITAL | Age: 74
End: 2024-02-14
Payer: MEDICARE

## 2024-02-14 DIAGNOSIS — R26.9 GAIT ABNORMALITY: Primary | ICD-10-CM

## 2024-02-14 PROCEDURE — 97112 NEUROMUSCULAR REEDUCATION: CPT | Mod: PO

## 2024-02-14 PROCEDURE — 97110 THERAPEUTIC EXERCISES: CPT | Mod: PO

## 2024-02-14 NOTE — PROGRESS NOTES
[facility]  Outpatient Physical Therapy Daily Treatment Note      Name: May Solomon  Clinic Number: [id number]  Visit Date: 2/14/2024    Therapy Diagnosis:   Encounter Diagnosis   Name Primary?    Gait abnormality Yes       Physician: Ari Lama, *    Physician Orders: PT Eval and Treat  2-3 times a week for 4-6 wks.  Medical Diagnosis from Referral: S/P hip replacement , left.  Sciatica of left side.  Evaluation Date: 1/9/2024  Authorization Period Expiration: 12/28/24.  Plan of Care Expiration: 4/12/24  Progress Note Due: 2/8/24  Date of Surgery: 12/13/23 LT VI  Visit # / Visits authorized: 4/ 20  PTA visit: 0/5    FOTO: 1/ 3 99/100     Precautions: Standard and Fall. Posterior approach.WBAT.     Time In: 1722  Time Out: 1806  Total Billable Time: 40 minutes    Subjective     The patient  reports mild soreness after last treatment     Response to previous treatment: min soreness   Functional change: decreased dependence on prescription meds,gait improving.     Pain: 0/10  Location: left hip      Objective     May received therapeutic exercises to develop strength, endurance, ROM, flexibility, posture, and core stabilization for 10 minutes including:    Nutstep 10 min level 1    May participated in neuromuscular re-education activities to improve: Balance, Coordination, Kinesthetic, Sense, Proprioception, and Posture for 30 minutes. The following activities were included:Not performed due to pt late.  // bars  Mini squats 30 airex  Marching 30/30 airex.  HR/TR 30 airex   Standing hip extension and abduction 3 x 10  Standing hamstring curls 2 x 15  3# weight today   short arc quad 3# 2 x 10 with emphasis on full knee extension on left lower extremity 3 sec hold  Clams RTB 30  Hip ADD 30 w/ball.  Bridges 3 x 10 with cues for glute activation   SLR 3X10 AROM LT    Lateral  walks 4X10'.    Restisted reto walks 5 ft x 2 NP  Gait without  AD.  May participated in dynamic functional therapeutic activities to improve functional performance for 0  minutes, including:  Side stepping // bars  Gait without ' CGA      May participated in gait training to improve functional mobility and safety for  minutes, includin ft with NBQC moderate cues for gait mechanics and management of Assistive device.       Patient Education and HEP     She was not compliant with home exercise program as she has not yet been issued one.    Education provided:   - home exercise program to be provided     Written Home Exercises Provided: yes.  Exercises were reviewed and May was able to demonstrate them prior to the end of the session.  May demonstrated good  understanding of the education provided.     See EMR under Patient Instructions for exercises provided  24 .    Assessment     The patient presented to clinic willing to participate with therapy. Patient was able to perform all recommended therapeutic exercises for strength, function, and balance without incident. Home exercise program was issued for standing exercise to advance the seated exercises she is already doing at home. She is expected to continue to gain strength and improved functional abilities and she progresses towards her goals.   Strength improving.  Pt will continue to benefit from skilled outpatient physical therapy to address the deficits listed in the problem list box on initial evaluation, provide pt/family education and to maximize pt's level of independence in the home and community environment.     May Is progressing well towards her goals.   Pt prognosis is Good.     Pt's spiritual, cultural and educational needs considered and pt agreeable to plan of care and goals.    Anticipated barriers to physical therapy: none    Goals: Goals:  SHORT TERM GOALS:  6 weeks  Progress Date met   Recent signs and systems trend is improving in order to progress towards Long term  goals.  [] Met  [] Not Met  [x] Progressing     Patient will be independent with Home Exercise Program  in order to further progress and return to maximal function. [] Met  [] Not Met  [x] Progressing     Pain rating at Worst: 5 /10 in order to progress towards increased independence with activity. [] Met  [] Not Met  [x] Progressing     Patient will be able to correct postural deviations in sitting and standing, to decrease pain and promote postural awareness for injury prevention.  [] Met  [] Not Met  [x] Progressing     Patient will improve functional outcome (FOTO) score: by 5% to increase self-worth & perceived functional ability towards long term goals [] Met  [] Not Met  [x] Progressing        LONG TERM GOALS: 12 weeks  Progress Date met   Patient will return to normal activites of daily living, recreational, and work related activities with less pain and limitation.  [] Met  [] Not Met  [] Progressing     Patient will improve range of motion  to stated goals in order to return to maximal functional potential. ROM of left hip is WNL/WFL in all planes. [] Met  [] Not Met  [] Progressing     Patient will improve Strength to stated goals of appropriate musculature in order to improve functional independence. Strength of left hip 5/5 in all planes. [] Met  [] Not Met  [] Progressing     Pain Rating at Best: 1/10 to improve Quality of Life.  [] Met  [] Not Met  [] Progressing     Patient will meet predicted functional outcome (FOTO) score: 90% to increase self-worth & perceived functional ability.Gait without AD,without abnormalities. [] Met  [] Not Met  [] Progressing     Patient will have met/partially met personal goal of: return to previous LOF, ambulate without AD. [] Met  [] Not Met  [] Progressing         Plan     Continue with the plan of care established per initial evaluation    Plan of care Certification: 1/9/2024 to 4/12/24.     Outpatient Physical Therapy 2 times weekly for 12 weeks    Sekou  Esteban, PT

## 2024-02-15 ENCOUNTER — CLINICAL SUPPORT (OUTPATIENT)
Dept: REHABILITATION | Facility: HOSPITAL | Age: 74
End: 2024-02-15
Payer: MEDICARE

## 2024-02-15 DIAGNOSIS — R26.9 GAIT ABNORMALITY: Primary | ICD-10-CM

## 2024-02-15 PROCEDURE — 97112 NEUROMUSCULAR REEDUCATION: CPT | Mod: PO

## 2024-02-15 PROCEDURE — 97110 THERAPEUTIC EXERCISES: CPT | Mod: PO

## 2024-02-15 NOTE — PROGRESS NOTES
[facility]  Outpatient Physical Therapy Daily Treatment Note      Name: May Solomon  Clinic Number: [id number]  Visit Date: 2/15/2024    Therapy Diagnosis:   Encounter Diagnosis   Name Primary?    Gait abnormality Yes       Physician: Ari Lama, *    Physician Orders: PT Eval and Treat  2-3 times a week for 4-6 wks.  Medical Diagnosis from Referral: S/P hip replacement , left.  Sciatica of left side.  Evaluation Date: 1/9/2024  Authorization Period Expiration: 12/28/24.  Plan of Care Expiration: 4/12/24  Progress Note Due: 2/8/24  Date of Surgery: 12/13/23 LT VI  Visit # / Visits authorized: 4/ 20  PTA visit: 0/5    FOTO: 1/ 3 99/100  FOTO VISIT 8 65/100.     Precautions: Standard and Fall. Posterior approach.WBAT.     Time In: 1430  Time Out: 1510  Total Billable Time: 40 minutes    Subjective     The patient  reports mild soreness after last treatment     Response to previous treatment: min soreness   Functional change: decreased dependence on prescription meds,gait improving.     Pain: 0/10  Location: left hip      Objective     May received therapeutic exercises to develop strength, endurance, ROM, flexibility, posture, and core stabilization for 10 minutes including:    Nutstep 10 min level 1    May participated in neuromuscular re-education activities to improve: Balance, Coordination, Kinesthetic, Sense, Proprioception, and Posture for 30 minutes. The following activities were included:Not performed due to pt late.  // bars  Mini squats 30 airex  Marching 30/30 airex.  HR/TR 30 airex   Standing hip extension and abduction 3 x 10  Standing hamstring curls 2 x 15  3# weight today   short arc quad 3# 2 x 10 with emphasis on full knee extension on left lower extremity 3 sec hold  Clams RTB 30  Hip ADD 30 w/ball.  Bridges 3 x 10 with cues for glute activation   SLR 3X10 AROM LT    Lateral  walks 4X10'.    Restisted reto walks 5 ft  x 2 NP  Gait without AD.  May participated in dynamic functional therapeutic activities to improve functional performance for 0  minutes, including:  Side stepping // bars  Gait without ' CGA      May participated in gait training to improve functional mobility and safety for  minutes, includin ft with NBQC moderate cues for gait mechanics and management of Assistive device.       Patient Education and HEP     She was not compliant with home exercise program as she has not yet been issued one.    Education provided:   - home exercise program to be provided     Written Home Exercises Provided: yes.  Exercises were reviewed and May was able to demonstrate them prior to the end of the session.  May demonstrated good  understanding of the education provided.     See EMR under Patient Instructions for exercises provided  24 .    Assessment     The patient presented to clinic willing to participate with therapy. Patient was able to perform all recommended therapeutic exercises for strength, function, and balance without incident. Home exercise program was issued for standing exercise to advance the seated exercises she is already doing at home. She is expected to continue to gain strength and improved functional abilities and she progresses towards her goals.   Strength improving.  Pt will continue to benefit from skilled outpatient physical therapy to address the deficits listed in the problem list box on initial evaluation, provide pt/family education and to maximize pt's level of independence in the home and community environment.     May Is progressing well towards her goals.   Pt prognosis is Good.     Pt's spiritual, cultural and educational needs considered and pt agreeable to plan of care and goals.    Anticipated barriers to physical therapy: none    Goals: Goals:  SHORT TERM GOALS:  6 weeks  Progress Date met   Recent signs and systems trend is improving in order to  progress towards Long term goals.  [] Met  [] Not Met  [x] Progressing     Patient will be independent with Home Exercise Program  in order to further progress and return to maximal function. [] Met  [] Not Met  [x] Progressing     Pain rating at Worst: 5 /10 in order to progress towards increased independence with activity. [] Met  [] Not Met  [x] Progressing     Patient will be able to correct postural deviations in sitting and standing, to decrease pain and promote postural awareness for injury prevention.  [] Met  [] Not Met  [x] Progressing     Patient will improve functional outcome (FOTO) score: by 5% to increase self-worth & perceived functional ability towards long term goals [] Met  [] Not Met  [x] Progressing        LONG TERM GOALS: 12 weeks  Progress Date met   Patient will return to normal activites of daily living, recreational, and work related activities with less pain and limitation.  [] Met  [] Not Met  [x] Progressing     Patient will improve range of motion  to stated goals in order to return to maximal functional potential. ROM of left hip is WNL/WFL in all planes. [] Met  [] Not Met  [x] Progressing     Patient will improve Strength to stated goals of appropriate musculature in order to improve functional independence. Strength of left hip 5/5 in all planes. [] Met  [] Not Met  [x] Progressing     Pain Rating at Best: 1/10 to improve Quality of Life.  [] Met  [] Not Met  [x] Progressing     Patient will meet predicted functional outcome (FOTO) score: 90% to increase self-worth & perceived functional ability.Gait without AD,without abnormalities. [] Met  [] Not Met  [x] Progressing     Patient will have met/partially met personal goal of: return to previous LOF, ambulate without AD. [] Met  [] Not Met  [x] Progressing         Plan     Continue with the plan of care established per initial evaluation    Plan of care Certification: 1/9/2024 to 4/12/24.     Outpatient Physical Therapy 2 times  weekly for 12 weeks    Sekou Orellana, PT

## 2024-02-19 ENCOUNTER — CLINICAL SUPPORT (OUTPATIENT)
Dept: REHABILITATION | Facility: HOSPITAL | Age: 74
End: 2024-02-19
Payer: MEDICARE

## 2024-02-19 DIAGNOSIS — R26.9 GAIT ABNORMALITY: Primary | ICD-10-CM

## 2024-02-19 PROCEDURE — 97112 NEUROMUSCULAR REEDUCATION: CPT | Mod: PO,CQ

## 2024-02-19 PROCEDURE — 97110 THERAPEUTIC EXERCISES: CPT | Mod: PO,CQ

## 2024-02-19 NOTE — PROGRESS NOTES
[facility]  Outpatient Physical Therapy Daily Treatment Note      [facility]  [facility] Number: [id number]  Visit Date: 2/19/2024    Therapy Diagnosis:   Encounter Diagnosis   Name Primary?    Gait abnormality Yes       Physician: Ari Lama, *    Physician Orders: PT Eval and Treat  2-3 times a week for 4-6 wks.  Medical Diagnosis from Referral: S/P hip replacement , left.  Sciatica of left side.  Evaluation Date: 1/9/2024  Authorization Period Expiration: 12/28/24.  Plan of Care Expiration: 4/12/24  Progress Note Due: 2/8/24  Date of Surgery: 12/13/23 LT VI  Visit # / Visits authorized: 5/ 20  PTA visit: 1/5    FOTO: 1/ 3 99/100  FOTO VISIT 8 65/100.     Precautions: Standard and Fall. Posterior approach.WBAT.     Time In: 1345  Time Out: 1430   Total Billable Time: 40 minutes    Subjective     The patient states that she is almost done with her hip precautions. She wants to start going to see massage therapist and chiropractor again.     Response to previous treatment: min soreness   Functional change: decreased dependence on prescription meds,gait improving.     Pain: 0/10  Location: left hip      Objective     May received therapeutic exercises to develop strength, endurance, ROM, flexibility, posture, and core stabilization for 10 minutes including:    Nutstep 10 min level 1    May participated in neuromuscular re-education activities to improve: Balance, Coordination, Kinesthetic, Sense, Proprioception, and Posture for 30 minutes. The following activities were included:Not performed due to pt late.  // bars  Mini squats 30 airex  Marching 30/30 airex.  HR/TR 30 airex   Standing hip extension and abduction 3 x 10  Standing hamstring curls 2 x 15  3# weight today   Long arc quad 3# 2 x 15   short arc quad 3# 2 x 10 with emphasis on full knee extension on left lower extremity 3 sec hold  Clams RTB 30  Hip ADD 30  w/ball.  Bridges 3 x 10 with cues for glute activation   SLR 3X10 AROM LT   Lateral  walks 4X10'.    Restisted reto walks 5 ft x 2 NP      May participated in dynamic functional therapeutic activities to improve functional performance for 0  minutes, including:  Side stepping // bars  Gait without ' CGA      May participated in gait training to improve functional mobility and safety for  minutes, includin ft with NBQC moderate cues for gait mechanics and management of Assistive device.       Patient Education and HEP     She was not compliant with home exercise program as she has not yet been issued one.    Education provided:   - home exercise program to be provided     Written Home Exercises Provided: yes.  Exercises were reviewed and May was able to demonstrate them prior to the end of the session.  May demonstrated good  understanding of the education provided.     See EMR under Patient Instructions for exercises provided  24 .    Assessment     The patient presented to clinic willing to participate with therapy. Patient was able to perform all recommended therapeutic exercises for strength, function, and balance without incident.. She is progressing well with strength and function. She will benefit from additional gait training to wean her off of rolling walker.     Pt will continue to benefit from skilled outpatient physical therapy to address the deficits listed in the problem list box on initial evaluation, provide pt/family education and to maximize pt's level of independence in the home and community environment.     May Is progressing well towards her goals.   Pt prognosis is Good.     Pt's spiritual, cultural and educational needs considered and pt agreeable to plan of care and goals.    Anticipated barriers to physical therapy: none    Goals: Goals:  SHORT TERM GOALS:  6 weeks  Progress Date met   Recent signs and systems trend is improving in order to progress  towards Long term goals.  [] Met  [] Not Met  [x] Progressing     Patient will be independent with Home Exercise Program  in order to further progress and return to maximal function. [] Met  [] Not Met  [x] Progressing     Pain rating at Worst: 5 /10 in order to progress towards increased independence with activity. [] Met  [] Not Met  [x] Progressing     Patient will be able to correct postural deviations in sitting and standing, to decrease pain and promote postural awareness for injury prevention.  [] Met  [] Not Met  [x] Progressing     Patient will improve functional outcome (FOTO) score: by 5% to increase self-worth & perceived functional ability towards long term goals [] Met  [] Not Met  [x] Progressing        LONG TERM GOALS: 12 weeks  Progress Date met   Patient will return to normal activites of daily living, recreational, and work related activities with less pain and limitation.  [] Met  [] Not Met  [x] Progressing     Patient will improve range of motion  to stated goals in order to return to maximal functional potential. ROM of left hip is WNL/WFL in all planes. [] Met  [] Not Met  [x] Progressing     Patient will improve Strength to stated goals of appropriate musculature in order to improve functional independence. Strength of left hip 5/5 in all planes. [] Met  [] Not Met  [x] Progressing     Pain Rating at Best: 1/10 to improve Quality of Life.  [] Met  [] Not Met  [x] Progressing     Patient will meet predicted functional outcome (FOTO) score: 90% to increase self-worth & perceived functional ability.Gait without AD,without abnormalities. [] Met  [] Not Met  [x] Progressing     Patient will have met/partially met personal goal of: return to previous LOF, ambulate without AD. [] Met  [] Not Met  [x] Progressing         Plan     Continue with the plan of care established per initial evaluation    Plan of care Certification: 1/9/2024 to 4/12/24.     Outpatient Physical Therapy 2 times weekly for 12  weeks    Melissa Olmos, PTA

## 2024-02-20 LAB
ALBUMIN SERPL-MCNC: 4.4 G/DL (ref 3.6–5.1)
ALBUMIN/CREAT UR: 21 MCG/MG CREAT
ALBUMIN/GLOB SERPL: 1.6 (CALC) (ref 1–2.5)
ALP SERPL-CCNC: 70 U/L (ref 37–153)
ALT SERPL-CCNC: 18 U/L (ref 6–29)
APPEARANCE UR: CLEAR
AST SERPL-CCNC: 23 U/L (ref 10–35)
BASOPHILS # BLD AUTO: 49 CELLS/UL (ref 0–200)
BASOPHILS NFR BLD AUTO: 1 %
BILIRUB SERPL-MCNC: 1 MG/DL (ref 0.2–1.2)
BILIRUB UR QL STRIP: NEGATIVE
BUN SERPL-MCNC: 16 MG/DL (ref 7–25)
BUN/CREAT SERPL: 16 (CALC) (ref 6–22)
CALCIUM SERPL-MCNC: 10.8 MG/DL (ref 8.6–10.4)
CHLORIDE SERPL-SCNC: 100 MMOL/L (ref 98–110)
CHOLEST SERPL-MCNC: 144 MG/DL
CHOLEST/HDLC SERPL: 2.7 (CALC)
CO2 SERPL-SCNC: 31 MMOL/L (ref 20–32)
COLOR UR: YELLOW
CREAT SERPL-MCNC: 1.03 MG/DL (ref 0.6–1)
CREAT UR-MCNC: 48 MG/DL (ref 20–275)
EGFR: 57 ML/MIN/1.73M2
EOSINOPHIL # BLD AUTO: 363 CELLS/UL (ref 15–500)
EOSINOPHIL NFR BLD AUTO: 7.4 %
ERYTHROCYTE [DISTWIDTH] IN BLOOD BY AUTOMATED COUNT: 12.7 % (ref 11–15)
GLOBULIN SER CALC-MCNC: 2.8 G/DL (CALC) (ref 1.9–3.7)
GLUCOSE SERPL-MCNC: 121 MG/DL (ref 65–99)
GLUCOSE UR QL STRIP: NEGATIVE
HBA1C MFR BLD: 6.3 % OF TOTAL HGB
HCT VFR BLD AUTO: 38.2 % (ref 35–45)
HDLC SERPL-MCNC: 54 MG/DL
HGB BLD-MCNC: 12.3 G/DL (ref 11.7–15.5)
HGB UR QL STRIP: NEGATIVE
KETONES UR QL STRIP: NEGATIVE
LDLC SERPL CALC-MCNC: 67 MG/DL (CALC)
LEUKOCYTE ESTERASE UR QL STRIP: ABNORMAL
LYMPHOCYTES # BLD AUTO: 1382 CELLS/UL (ref 850–3900)
LYMPHOCYTES NFR BLD AUTO: 28.2 %
MCH RBC QN AUTO: 27.8 PG (ref 27–33)
MCHC RBC AUTO-ENTMCNC: 32.2 G/DL (ref 32–36)
MCV RBC AUTO: 86.4 FL (ref 80–100)
MICROALBUMIN UR-MCNC: 1 MG/DL
MONOCYTES # BLD AUTO: 485 CELLS/UL (ref 200–950)
MONOCYTES NFR BLD AUTO: 9.9 %
NEUTROPHILS # BLD AUTO: 2622 CELLS/UL (ref 1500–7800)
NEUTROPHILS NFR BLD AUTO: 53.5 %
NITRITE UR QL STRIP: POSITIVE
NONHDLC SERPL-MCNC: 90 MG/DL (CALC)
PH UR STRIP: 7 [PH] (ref 5–8)
PLATELET # BLD AUTO: 234 THOUSAND/UL (ref 140–400)
PMV BLD REES-ECKER: 10.4 FL (ref 7.5–12.5)
POTASSIUM SERPL-SCNC: 4.7 MMOL/L (ref 3.5–5.3)
PROT SERPL-MCNC: 7.2 G/DL (ref 6.1–8.1)
PROT UR QL STRIP: NEGATIVE
RBC # BLD AUTO: 4.42 MILLION/UL (ref 3.8–5.1)
SODIUM SERPL-SCNC: 141 MMOL/L (ref 135–146)
SP GR UR STRIP: 1.01 (ref 1–1.03)
TRIGL SERPL-MCNC: 147 MG/DL
TSH SERPL-ACNC: 3.47 MIU/L (ref 0.4–4.5)
WBC # BLD AUTO: 4.9 THOUSAND/UL (ref 3.8–10.8)

## 2024-02-21 ENCOUNTER — CLINICAL SUPPORT (OUTPATIENT)
Dept: REHABILITATION | Facility: HOSPITAL | Age: 74
End: 2024-02-21
Payer: MEDICARE

## 2024-02-21 ENCOUNTER — OFFICE VISIT (OUTPATIENT)
Dept: FAMILY MEDICINE | Facility: CLINIC | Age: 74
End: 2024-02-21
Payer: MEDICARE

## 2024-02-21 VITALS
OXYGEN SATURATION: 95 % | SYSTOLIC BLOOD PRESSURE: 130 MMHG | HEART RATE: 100 BPM | TEMPERATURE: 97 F | DIASTOLIC BLOOD PRESSURE: 68 MMHG | BODY MASS INDEX: 29.57 KG/M2 | WEIGHT: 166.88 LBS | HEIGHT: 63 IN

## 2024-02-21 DIAGNOSIS — N18.31 TYPE 2 DIABETES MELLITUS WITH STAGE 3A CHRONIC KIDNEY DISEASE, WITHOUT LONG-TERM CURRENT USE OF INSULIN: ICD-10-CM

## 2024-02-21 DIAGNOSIS — E78.00 PURE HYPERCHOLESTEROLEMIA: ICD-10-CM

## 2024-02-21 DIAGNOSIS — E11.22 TYPE 2 DIABETES MELLITUS WITH STAGE 3A CHRONIC KIDNEY DISEASE, WITHOUT LONG-TERM CURRENT USE OF INSULIN: ICD-10-CM

## 2024-02-21 DIAGNOSIS — I10 ESSENTIAL HYPERTENSION: ICD-10-CM

## 2024-02-21 DIAGNOSIS — R26.9 GAIT ABNORMALITY: Primary | ICD-10-CM

## 2024-02-21 PROCEDURE — 97110 THERAPEUTIC EXERCISES: CPT | Mod: PO

## 2024-02-21 PROCEDURE — 3288F FALL RISK ASSESSMENT DOCD: CPT | Mod: CPTII,S$GLB,, | Performed by: INTERNAL MEDICINE

## 2024-02-21 PROCEDURE — 1126F AMNT PAIN NOTED NONE PRSNT: CPT | Mod: CPTII,S$GLB,, | Performed by: INTERNAL MEDICINE

## 2024-02-21 PROCEDURE — 3078F DIAST BP <80 MM HG: CPT | Mod: CPTII,S$GLB,, | Performed by: INTERNAL MEDICINE

## 2024-02-21 PROCEDURE — 97112 NEUROMUSCULAR REEDUCATION: CPT | Mod: PO

## 2024-02-21 PROCEDURE — 3075F SYST BP GE 130 - 139MM HG: CPT | Mod: CPTII,S$GLB,, | Performed by: INTERNAL MEDICINE

## 2024-02-21 PROCEDURE — 99999 PR PBB SHADOW E&M-EST. PATIENT-LVL III: CPT | Mod: PBBFAC,,, | Performed by: INTERNAL MEDICINE

## 2024-02-21 PROCEDURE — 1100F PTFALLS ASSESS-DOCD GE2>/YR: CPT | Mod: CPTII,S$GLB,, | Performed by: INTERNAL MEDICINE

## 2024-02-21 PROCEDURE — 3044F HG A1C LEVEL LT 7.0%: CPT | Mod: CPTII,S$GLB,, | Performed by: INTERNAL MEDICINE

## 2024-02-21 PROCEDURE — 99214 OFFICE O/P EST MOD 30 MIN: CPT | Mod: S$GLB,,, | Performed by: INTERNAL MEDICINE

## 2024-02-21 PROCEDURE — 1159F MED LIST DOCD IN RCRD: CPT | Mod: CPTII,S$GLB,, | Performed by: INTERNAL MEDICINE

## 2024-02-21 RX ORDER — LOSARTAN POTASSIUM 50 MG/1
50 TABLET ORAL DAILY
Qty: 90 TABLET | Refills: 1 | Status: SHIPPED | OUTPATIENT
Start: 2024-02-21

## 2024-02-21 RX ORDER — ROSUVASTATIN CALCIUM 10 MG/1
10 TABLET, COATED ORAL DAILY
Qty: 90 TABLET | Refills: 1 | Status: SHIPPED | OUTPATIENT
Start: 2024-02-21

## 2024-02-21 RX ORDER — METFORMIN HYDROCHLORIDE 500 MG/1
500 TABLET ORAL 2 TIMES DAILY WITH MEALS
Qty: 180 TABLET | Refills: 1 | Status: SHIPPED | OUTPATIENT
Start: 2024-02-21 | End: 2025-02-20

## 2024-02-21 NOTE — PROGRESS NOTES
[facility]  Outpatient Physical Therapy Daily Treatment Note      [facility]  Cl[facility] Number: [id number]  Visit Date: 2/21/2024    Therapy Diagnosis:   Encounter Diagnosis   Name Primary?    Gait abnormality Yes       Physician: Ari Lama, *    Physician Orders: PT Eval and Treat  2-3 times a week for 4-6 wks.  Medical Diagnosis from Referral: S/P hip replacement , left.  Sciatica of left side.  Evaluation Date: 1/9/2024  Authorization Period Expiration: 12/28/24.  Plan of Care Expiration: 4/12/24  Progress Note Due: 2/8/24  Date of Surgery: 12/13/23 LT VI  Visit # / Visits authorized: 7/ 20  PTA visit: 1/5    FOTO: 1/ 3 99/100  FOTO VISIT 8 65/100.     Precautions: Standard and Fall. Posterior approach.WBAT.     Time In: 0955  Time Out: 1036   Total Billable Time: 40 minutes    Subjective     The patient states that she is almost done with her hip precautions. She wants to start going to see massage therapist and chiropractor again.     Response to previous treatment: min soreness   Functional change: decreased dependence on prescription meds,gait improving.     Pain: 0/10  Location: left hip      Objective     May received therapeutic exercises to develop strength, endurance, ROM, flexibility, posture, and core stabilization for 10 minutes including:    Nutstep 10 min level 1    May participated in neuromuscular re-education activities to improve: Balance, Coordination, Kinesthetic, Sense, Proprioception, and Posture for 30 minutes. The following activities were included:Not performed due to pt late.  // bars  Mini squats 30 airex  Marching 30/30 airex.  HR/TR 30 airex   Gastroc stretch 3x30 L/R.  Standing hip extension and abduction 3 x 10  Standing hamstring curls 2 x 15  3# weight today   Long arc quad 3# 2 x 15   short arc quad 3# 2 x 10 with emphasis on full knee extension on left lower extremity 3 sec hold  Clams RTB  30  Hip ADD 30 w/ball.  Bridges 3 x 10 with cues for glute activation   SLR 3X10 AROM LT   Lateral  walks 4X10'.    Restisted reto walks 5 ft x 2 NP      May participated in dynamic functional therapeutic activities to improve functional performance for 0  minutes, including:  Side stepping // bars  Gait without ' CGA      May participated in gait training to improve functional mobility and safety for  minutes, includin ft with NBQC moderate cues for gait mechanics and management of Assistive device.       Patient Education and HEP     She was not compliant with home exercise program as she has not yet been issued one.    Education provided:   - home exercise program to be provided     Written Home Exercises Provided: yes.  Exercises were reviewed and May was able to demonstrate them prior to the end of the session.  May demonstrated good  understanding of the education provided.     See EMR under Patient Instructions for exercises provided  24 .    Assessment     Gait pattern improving.  The patient presented to clinic willing to participate with therapy. Patient was able to perform all recommended therapeutic exercises for strength, function, and balance without incident.. She is progressing well with strength and function. She will benefit from additional gait training to wean her off of rolling walker.     Pt will continue to benefit from skilled outpatient physical therapy to address the deficits listed in the problem list box on initial evaluation, provide pt/family education and to maximize pt's level of independence in the home and community environment.     May Is progressing well towards her goals.   Pt prognosis is Good.     Pt's spiritual, cultural and educational needs considered and pt agreeable to plan of care and goals.    Anticipated barriers to physical therapy: none    Goals: Goals:  SHORT TERM GOALS:  6 weeks  Progress Date met   Recent signs and systems  trend is improving in order to progress towards Long term goals.  [] Met  [] Not Met  [x] Progressing     Patient will be independent with Home Exercise Program  in order to further progress and return to maximal function. [] Met  [] Not Met  [x] Progressing     Pain rating at Worst: 5 /10 in order to progress towards increased independence with activity. [] Met  [] Not Met  [x] Progressing     Patient will be able to correct postural deviations in sitting and standing, to decrease pain and promote postural awareness for injury prevention.  [] Met  [] Not Met  [x] Progressing     Patient will improve functional outcome (FOTO) score: by 5% to increase self-worth & perceived functional ability towards long term goals [] Met  [] Not Met  [x] Progressing        LONG TERM GOALS: 12 weeks  Progress Date met   Patient will return to normal activites of daily living, recreational, and work related activities with less pain and limitation.  [] Met  [] Not Met  [x] Progressing     Patient will improve range of motion  to stated goals in order to return to maximal functional potential. ROM of left hip is WNL/WFL in all planes. [] Met  [] Not Met  [x] Progressing     Patient will improve Strength to stated goals of appropriate musculature in order to improve functional independence. Strength of left hip 5/5 in all planes. [] Met  [] Not Met  [x] Progressing     Pain Rating at Best: 1/10 to improve Quality of Life.  [] Met  [] Not Met  [x] Progressing     Patient will meet predicted functional outcome (FOTO) score: 90% to increase self-worth & perceived functional ability.Gait without AD,without abnormalities. [] Met  [] Not Met  [x] Progressing     Patient will have met/partially met personal goal of: return to previous LOF, ambulate without AD. [] Met  [] Not Met  [x] Progressing         Plan     Continue with the plan of care established per initial evaluation    Plan of care Certification: 1/9/2024 to 4/12/24.     Outpatient  Physical Therapy 2 times weekly for 12 weeks    Sekou Orellana, PT       29-Apr-2021

## 2024-02-21 NOTE — PROGRESS NOTES
Subjective:       Patient ID: May Solomon is a 73 y.o. female.    Chief Complaint: Diabetes (5 months follow up), Hypertension, and Hyperlipidemia    Here for routine follow up; last visit note, most recent available labs, and health maintenance topics reviewed.       Diabetes  She presents for her follow-up diabetic visit. She has type 2 diabetes mellitus. No MedicAlert identification noted. The initial diagnosis of diabetes was made 30 years ago. Her disease course has been improving. Pertinent negatives for hypoglycemia include no confusion, dizziness, headaches, hunger, mood changes, nervousness/anxiousness, pallor, seizures, sleepiness, speech difficulty, sweats or tremors. Pertinent negatives for diabetes include no blurred vision, no chest pain, no fatigue, no foot paresthesias, no foot ulcerations, no polydipsia, no polyphagia, no polyuria, no visual change, no weakness and no weight loss. There are no hypoglycemic complications. Pertinent negatives for hypoglycemia complications include no blackouts, no hospitalization, no nocturnal hypoglycemia, no required assistance and no required glucagon injection. Symptoms are stable. Pertinent negatives for diabetic complications include no autonomic neuropathy, CVA, heart disease, nephropathy, peripheral neuropathy, PVD or retinopathy. Risk factors for coronary artery disease include dyslipidemia, obesity, post-menopausal and diabetes mellitus. Current diabetic treatment includes oral agent (monotherapy). She is compliant with treatment all of the time. She is currently taking insulin pre-breakfast and at bedtime. Her weight is fluctuating minimally. She is following a generally healthy diet. Meal planning includes avoidance of concentrated sweets. She has not had a previous visit with a dietitian. She participates in exercise intermittently. She monitors blood glucose at home 1-2 x per day. Blood glucose monitoring compliance is excellent. There is no  change in her home blood glucose trend. Her breakfast blood glucose range is generally 110-130 mg/dl. An ACE inhibitor/angiotensin II receptor blocker is being taken. She does not see a podiatrist.Eye exam is not current.   Hypertension  This is a chronic problem. The problem is controlled (usually 120s/80s when she checks it which isn't very often). Pertinent negatives include no anxiety, blurred vision, chest pain, headaches, malaise/fatigue, neck pain, palpitations, peripheral edema, shortness of breath or sweats. Past treatments include angiotensin blockers. There are no compliance problems.  There is no history of CVA, PVD or retinopathy.   Hyperlipidemia  This is a chronic problem. Recent lipid tests were reviewed and are normal. Exacerbating diseases include diabetes. Pertinent negatives include no chest pain, myalgias or shortness of breath. Current antihyperlipidemic treatment includes statins. There are no compliance problems.      Review of Systems   Constitutional:  Negative for activity change, appetite change, chills, diaphoresis, fatigue, fever, malaise/fatigue, unexpected weight change and weight loss.   HENT:  Negative for congestion, ear discharge, ear pain, hearing loss, mouth sores, nosebleeds, postnasal drip, rhinorrhea, sinus pressure, sinus pain, sneezing, sore throat, tinnitus, trouble swallowing and voice change.    Eyes:  Negative for blurred vision, photophobia, pain, discharge, redness, itching and visual disturbance.   Respiratory:  Negative for apnea, cough, choking, chest tightness, shortness of breath and wheezing.    Cardiovascular:  Negative for chest pain, palpitations and leg swelling.   Gastrointestinal:  Negative for abdominal distention, abdominal pain, blood in stool, constipation, diarrhea, nausea and vomiting.   Endocrine: Negative for cold intolerance, heat intolerance, polydipsia, polyphagia and polyuria.   Genitourinary:  Negative for decreased urine volume, difficulty  urinating, dyspareunia, dysuria, enuresis, frequency, genital sores, hematuria, menstrual problem, pelvic pain, urgency, vaginal bleeding, vaginal discharge and vaginal pain.   Musculoskeletal:  Negative for arthralgias, back pain, gait problem, joint swelling, myalgias, neck pain and neck stiffness.   Skin:  Negative for pallor, rash and wound.   Allergic/Immunologic: Negative for environmental allergies, food allergies and immunocompromised state.   Neurological:  Negative for dizziness, tremors, seizures, syncope, facial asymmetry, speech difficulty, weakness, light-headedness, numbness and headaches.   Hematological:  Negative for adenopathy. Does not bruise/bleed easily.   Psychiatric/Behavioral:  Negative for confusion, decreased concentration, hallucinations, self-injury, sleep disturbance and suicidal ideas. The patient is not nervous/anxious.        Past Medical History:   Diagnosis Date    Basal cell carcinoma     Diabetes mellitus, type 2     History of colonic polyps     HTN (hypertension)     Hyperlipidemia       Past Surgical History:   Procedure Laterality Date    ARTHROSCOPY OF KNEE Right 2013    BREAST BIOPSY      COLONOSCOPY  05/2017    EYE SURGERY Right 05/2021    CATARACT    HIP REPLACEMENT ARTHROPLASTY Left 12/13/2023    Procedure: ARTHROPLASTY, HIP REPLACEMENT;  Surgeon: Sachin Tong MD;  Location: [facility] OR;  Service: Orthopedics;  Laterality: Left;    KNEE ARTHROSCOPY W/ MENISCECTOMY Left 9/13/2023    Procedure: ARTHROSCOPY, KNEE, WITH MENISCECTOMY;  Surgeon: Sachin Tong MD;  Location: [facility] OR;  Service: Orthopedics;  Laterality: Left;    TRIGGER FINGER RELEASE Left 10/26/2022    Procedure: RELEASE, TRIGGER FINGER;  Surgeon: Sachin Tong MD;  Location: [facility] OR;  Service: Orthopedics;  Laterality: Left;       Family History   Problem Relation Age of Onset    Colon cancer Mother 76    Hypertension Mother     Obesity Mother     Prostate cancer Father     Breast cancer Neg Hx        Social  History     Socioeconomic History    Marital status:    Occupational History    Occupation: retired   Tobacco Use    Smoking status: Never    Smokeless tobacco: Never   Substance and Sexual Activity    Alcohol use: Yes     Comment: occasional    Drug use: Never    Sexual activity: Not Currently   Social History Narrative    Live with son     Social Determinants of Health     Financial Resource Strain: Low Risk  (2/19/2024)    Overall Financial Resource Strain (CARDIA)     Difficulty of Paying Living Expenses: Not hard at all   Food Insecurity: No Food Insecurity (2/19/2024)    Hunger Vital Sign     Worried About Running Out of Food in the Last Year: Never true     Ran Out of Food in the Last Year: Never true   Transportation Needs: No Transportation Needs (2/19/2024)    PRAPARE - Transportation     Lack of Transportation (Medical): No     Lack of Transportation (Non-Medical): No   Physical Activity: Inactive (2/19/2024)    Exercise Vital Sign     Days of Exercise per Week: 0 days     Minutes of Exercise per Session: 0 min   Stress: No Stress Concern Present (2/19/2024)    [employer] - Occupational Stress Questionnaire     Feeling of Stress : Not at all   Social Connections: Unknown (2/19/2024)    Social Connection and Isolation Panel [NHANES]     Frequency of Communication with Friends and Family: More than three times a week     Frequency of Social Gatherings with Friends and Family: Once a week     Active Member of Clubs or Organizations: No     Attends Club or Organization Meetings: Never     Marital Status:    Housing Stability: Low Risk  (2/19/2024)    Housing Stability Vital Sign     Unable to Pay for Housing in the Last Year: No     Number of Places Lived in the Last Year: 2     Unstable Housing in the Last Year: No       Current Outpatient Medications   Medication Sig Dispense Refill    calcium carbonate (OS-SANDEEP) 500 mg calcium (1,250 mg) chewable tablet Take 1  "tablet by mouth once daily.      multivitamin (THERAGRAN) per tablet Take 1 tablet by mouth once daily.      losartan (COZAAR) 50 MG tablet Take 1 tablet (50 mg total) by mouth once daily. 90 tablet 1    metFORMIN (GLUCOPHAGE) 500 MG tablet Take 1 tablet (500 mg total) by mouth 2 (two) times daily with meals. 180 tablet 1    rosuvastatin (CRESTOR) 10 MG tablet Take 1 tablet (10 mg total) by mouth once daily. 90 tablet 1     No current facility-administered medications for this visit.       Review of patient's allergies indicates:   Allergen Reactions    Sulfa (sulfonamide antibiotics) Rash     Objective:    HPI     Diabetes     Additional comments: 5 months follow up          Last edited by Nelson Rosario MA on 2/21/2024  2:02 PM.      Blood pressure 130/68, pulse 100, temperature 96.5 °F (35.8 °C), temperature source Temporal, height 5' 3" (1.6 m), weight 75.7 kg (166 lb 14.2 oz), SpO2 95 %. Body mass index is 29.56 kg/m².   Physical Exam  Vitals and nursing note reviewed.   Constitutional:       General: She is not in acute distress.     Appearance: She is well-developed. She is obese. She is not ill-appearing, toxic-appearing or diaphoretic.   HENT:      Head: Normocephalic and atraumatic.   Eyes:      General: No scleral icterus.        Right eye: No discharge.         Left eye: No discharge.      Conjunctiva/sclera: Conjunctivae normal.   Neck:      Vascular: No carotid bruit.   Cardiovascular:      Rate and Rhythm: Normal rate and regular rhythm.      Heart sounds: Normal heart sounds. No murmur heard.  Pulmonary:      Effort: Pulmonary effort is normal. No respiratory distress.      Breath sounds: Normal breath sounds. No decreased breath sounds, wheezing, rhonchi or rales.   Abdominal:      General: There is no distension.      Palpations: Abdomen is soft.      Tenderness: There is no abdominal tenderness. There is no guarding or rebound.   Musculoskeletal:      Right lower leg: No edema.      Left lower " leg: No edema.   Skin:     General: Skin is warm and dry.   Neurological:      Mental Status: She is alert.      Motor: No tremor.      Gait: Gait abnormal (walker).   Psychiatric:         Mood and Affect: Mood normal.         Speech: Speech normal.         Behavior: Behavior normal.           No visits with results within 1 Month(s) from this visit.   Latest known visit with results is:   Office Visit on 01/09/2024   Component Date Value Ref Range Status    Glucose 02/19/2024 121 (H)  65 - 99 mg/dL Final    Comment:               Fasting reference interval     For someone without known diabetes, a glucose value  between 100 and 125 mg/dL is consistent with  prediabetes and should be confirmed with a  follow-up test.         BUN 02/19/2024 16  7 - 25 mg/dL Final    Creatinine 02/19/2024 1.03 (H)  0.60 - 1.00 mg/dL Final    eGFR 02/19/2024 57 (L)  > OR = 60 mL/min/1.73m2 Final    BUN/Creatinine Ratio 02/19/2024 16  6 - 22 (calc) Final    Sodium 02/19/2024 141  135 - 146 mmol/L Final    Potassium 02/19/2024 4.7  3.5 - 5.3 mmol/L Final    Chloride 02/19/2024 100  98 - 110 mmol/L Final    CO2 02/19/2024 31  20 - 32 mmol/L Final    Calcium 02/19/2024 10.8 (H)  8.6 - 10.4 mg/dL Final    Total Protein 02/19/2024 7.2  6.1 - 8.1 g/dL Final    Albumin 02/19/2024 4.4  3.6 - 5.1 g/dL Final    Globulin, Total 02/19/2024 2.8  1.9 - 3.7 g/dL (calc) Final    Albumin/Globulin Ratio 02/19/2024 1.6  1.0 - 2.5 (calc) Final    Total Bilirubin 02/19/2024 1.0  0.2 - 1.2 mg/dL Final    Alkaline Phosphatase 02/19/2024 70  37 - 153 U/L Final    AST 02/19/2024 23  10 - 35 U/L Final    ALT 02/19/2024 18  6 - 29 U/L Final    Hemoglobin A1C 02/19/2024 6.3 (H)  <5.7 % of total Hgb Final    Comment: For someone without known diabetes, a hemoglobin   A1c value between 5.7% and 6.4% is consistent with  prediabetes and should be confirmed with a   follow-up test.     For someone with known diabetes, a value <7%  indicates that their diabetes is well  controlled. A1c  targets should be individualized based on duration of  diabetes, age, comorbid conditions, and other  considerations.     This assay result is consistent with an increased risk  of diabetes.     Currently, no consensus exists regarding use of  hemoglobin A1c for diagnosis of diabetes for children.     HbA1c performed on Roche platform.  Effective 11/13/23 a change in test platforms may have   shifted HbA1c results compared to historical results.          Cholesterol 02/19/2024 144  <200 mg/dL Final    HDL 02/19/2024 54  > OR = 50 mg/dL Final    Triglycerides 02/19/2024 147  <150 mg/dL Final    LDL Cholesterol 02/19/2024 67  mg/dL (calc) Final    Comment: Reference range: <100     Desirable range <100 mg/dL for primary prevention;    <70 mg/dL for patients with CHD or diabetic patients   with > or = 2 CHD risk factors.     LDL-C is now calculated using the Pacheco-Krystle   calculation, which is a validated novel method providing   better accuracy than the Friedewald equation in the   estimation of LDL-C.   Pacheco SS et al. MADHURI. 2013;310(19): 0905-9947   (http://education.Acunu.Swapsee/faq/GSE225)      HDL/Cholesterol Ratio 02/19/2024 2.7  <5.0 (calc) Final    Non HDL Chol. (LDL+VLDL) 02/19/2024 90  <130 mg/dL (calc) Final    Comment: For patients with diabetes plus 1 major ASCVD risk   factor, treating to a non-HDL-C goal of <100 mg/dL   (LDL-C of <70 mg/dL) is considered a therapeutic   option.      Creatinine, Urine 02/19/2024 48  20 - 275 mg/dL Final    Microalb, Ur 02/19/2024 1.0  See Note: mg/dL Final    Comment: Reference Range:    Reference Range  Not established      Microalb/Creat Ratio 02/19/2024 21  <30 mcg/mg creat Final    Comment:    The ADA defines abnormalities in albumin  excretion as follows:     Albuminuria Category        Result (mcg/mg creatinine)     Normal to Mildly increased   <30  Moderately increased            Severely increased           > OR = 300     The  ADA recommends that at least two of three  specimens collected within a 3-6 month period be  abnormal before considering a patient to be  within a diagnostic category.      Color, UA 02/19/2024 YELLOW  YELLOW Final    Appearance, UA 02/19/2024 CLEAR  CLEAR Final    Specific Gravity, UA 02/19/2024 1.008  1.001 - 1.035 Final    pH, UA 02/19/2024 7.0  5.0 - 8.0 Final    Glucose, UA 02/19/2024 NEGATIVE  NEGATIVE Final    Bilirubin, UA 02/19/2024 NEGATIVE  NEGATIVE Final    Ketones, UA 02/19/2024 NEGATIVE  NEGATIVE Final    Occult Blood UA 02/19/2024 NEGATIVE  NEGATIVE Final    Protein, UA 02/19/2024 NEGATIVE  NEGATIVE Final    Nitrite, UA 02/19/2024 POSITIVE (A)  NEGATIVE Final    Leukocytes, UA 02/19/2024 2+ (A)  NEGATIVE Final    TSH 02/19/2024 3.47  0.40 - 4.50 mIU/L Final    WBC 02/19/2024 4.9  3.8 - 10.8 Thousand/uL Final    RBC 02/19/2024 4.42  3.80 - 5.10 Million/uL Final    Hemoglobin 02/19/2024 12.3  11.7 - 15.5 g/dL Final    Hematocrit 02/19/2024 38.2  35.0 - 45.0 % Final    MCV 02/19/2024 86.4  80.0 - 100.0 fL Final    MCH 02/19/2024 27.8  27.0 - 33.0 pg Final    MCHC 02/19/2024 32.2  32.0 - 36.0 g/dL Final    RDW 02/19/2024 12.7  11.0 - 15.0 % Final    Platelets 02/19/2024 234  140 - 400 Thousand/uL Final    MPV 02/19/2024 10.4  7.5 - 12.5 fL Final    Neutrophils, Abs 02/19/2024 2,622  1,500 - 7,800 cells/uL Final    Lymph # 02/19/2024 1,382  850 - 3,900 cells/uL Final    Mono # 02/19/2024 485  200 - 950 cells/uL Final    Eos # 02/19/2024 363  15 - 500 cells/uL Final    Baso # 02/19/2024 49  0 - 200 cells/uL Final    Neutrophils Relative 02/19/2024 53.5  % Final    Lymph % 02/19/2024 28.2  % Final    Mono % 02/19/2024 9.9  % Final    Eosinophil % 02/19/2024 7.4  % Final    Basophil % 02/19/2024 1.0  % Final   ]  Assessment:       1. Type 2 diabetes mellitus with stage 3a chronic kidney disease, without long-term current use of insulin    2. Pure hypercholesterolemia    3. Essential hypertension        Plan:        May was seen today for diabetes, hypertension and hyperlipidemia.    Diagnoses and all orders for this visit:    Type 2 diabetes mellitus with stage 3a chronic kidney disease, without long-term current use of insulin  -     rosuvastatin (CRESTOR) 10 MG tablet; Take 1 tablet (10 mg total) by mouth once daily.  -     metFORMIN (GLUCOPHAGE) 500 MG tablet; Take 1 tablet (500 mg total) by mouth 2 (two) times daily with meals.    Pure hypercholesterolemia  -     rosuvastatin (CRESTOR) 10 MG tablet; Take 1 tablet (10 mg total) by mouth once daily.    Essential hypertension  -     losartan (COZAAR) 50 MG tablet; Take 1 tablet (50 mg total) by mouth once daily.         Discussed RSV vaccine

## 2024-02-26 ENCOUNTER — CLINICAL SUPPORT (OUTPATIENT)
Dept: REHABILITATION | Facility: HOSPITAL | Age: 74
End: 2024-02-26
Payer: MEDICARE

## 2024-02-26 ENCOUNTER — DOCUMENTATION ONLY (OUTPATIENT)
Dept: REHABILITATION | Facility: HOSPITAL | Age: 74
End: 2024-02-26
Payer: MEDICARE

## 2024-02-26 DIAGNOSIS — R26.9 GAIT ABNORMALITY: Primary | ICD-10-CM

## 2024-02-26 PROCEDURE — 97116 GAIT TRAINING THERAPY: CPT | Mod: PO,CQ

## 2024-02-26 PROCEDURE — 97110 THERAPEUTIC EXERCISES: CPT | Mod: PO,CQ

## 2024-02-26 PROCEDURE — 97112 NEUROMUSCULAR REEDUCATION: CPT | Mod: PO,CQ

## 2024-02-26 NOTE — PROGRESS NOTES
[facility]  Outpatient Physical Therapy Daily Treatment Note      Name: May HernándezSierra [place]  Clinic Number: [id number]  Visit Date: 2/26/2024    Therapy Diagnosis:   Encounter Diagnosis   Name Primary?    Gait abnormality Yes       Physician: Ari Lama, *    Physician Orders: PT Eval and Treat  2-3 times a week for 4-6 wks.  Medical Diagnosis from Referral: S/P hip replacement , left.  Sciatica of left side.  Evaluation Date: 1/9/2024  Authorization Period Expiration: 12/28/24.  Plan of Care Expiration: 4/12/24  Progress Note Due: 2/8/24  Date of Surgery: 12/13/23 LT VI  Visit # / Visits authorized: 8/ 20  PTA visit: 1/5    FOTO: 1/ 3 99/100  FOTO VISIT 8 65/100.     Precautions: Standard and Fall. Posterior approach.WBAT.     Time In: 0955  Time Out: 1036   Total Billable Time: 40 minutes    Subjective     The patient states that she is almost done with her hip precautions. She wants to start going to see massage therapist and chiropractor again.     Response to previous treatment: min soreness   Functional change: decreased dependence on prescription meds,gait improving.     Pain: 0/10  Location: left hip      Objective     May received therapeutic exercises to develop strength, endurance, ROM, flexibility, posture, and core stabilization for 10 minutes including:    Nutstep 10 min level 1    May participated in neuromuscular re-education activities to improve: Balance, Coordination, Kinesthetic, Sense, Proprioception, and Posture for 25 minutes. The following activities were included:Not performed due to pt late.  // bars    Heel raises 30 x   Gastroc stretch 3x30 L/R.  Standing hip extension and abduction with 3# 3 x 10  Standing hamstring curls 2 x 15  3# weight today   Long arc quad 3# 2 x 15     Not performed today   Mini squats 30 airex  Marching 30/30 airex.  HR/TR 30 airex   Gastroc stretch 3x30 L/R.  Clams RTB 30  Hip ADD 30  w/ball.  Bridges 3 x 10 with cues for glute activation   SLR 3X10 AROM LT   Lateral  walks 4X10'.    Restisted reto walks 5 ft x 2 NP      May participated in dynamic functional therapeutic activities to improve functional performance for 0  minutes, including:  Side stepping // bars  Gait without ' CGA      May participated in gait training to improve functional mobility and safety for 10 minutes, including:    Gait training with single point cane including navigation of uneven terrain, ramps, and curbs . CGA for safety       Patient Education and HEP     She was not compliant with home exercise program as she has not yet been issued one.    Education provided:   - home exercise program to be provided     Written Home Exercises Provided: yes.  Exercises were reviewed and May was able to demonstrate them prior to the end of the session.  May demonstrated good  understanding of the education provided.     See EMR under Patient Instructions for exercises provided  1/29/24 .    Assessment     Patient tolerated advancements in gait training with less restrictive device including navigation over uneven terrain including sidewalk, ramps, and ascending/descending curbs. CGA was maintained for safety however patient navigated all challenges without loss of balance.     Pt will continue to benefit from skilled outpatient physical therapy to address the deficits listed in the problem list box on initial evaluation, provide pt/family education and to maximize pt's level of independence in the home and community environment.     May Is progressing well towards her goals.   Pt prognosis is Good.     Pt's spiritual, cultural and educational needs considered and pt agreeable to plan of care and goals.    Anticipated barriers to physical therapy: none    Goals: Goals:  SHORT TERM GOALS:  6 weeks  Progress Date met   Recent signs and systems trend is improving in order to progress towards Long term goals.   [] Met  [] Not Met  [x] Progressing     Patient will be independent with Home Exercise Program  in order to further progress and return to maximal function. [] Met  [] Not Met  [x] Progressing     Pain rating at Worst: 5 /10 in order to progress towards increased independence with activity. [] Met  [] Not Met  [x] Progressing     Patient will be able to correct postural deviations in sitting and standing, to decrease pain and promote postural awareness for injury prevention.  [] Met  [] Not Met  [x] Progressing     Patient will improve functional outcome (FOTO) score: by 5% to increase self-worth & perceived functional ability towards long term goals [] Met  [] Not Met  [x] Progressing        LONG TERM GOALS: 12 weeks  Progress Date met   Patient will return to normal activites of daily living, recreational, and work related activities with less pain and limitation.  [] Met  [] Not Met  [x] Progressing     Patient will improve range of motion  to stated goals in order to return to maximal functional potential. ROM of left hip is WNL/WFL in all planes. [] Met  [] Not Met  [x] Progressing     Patient will improve Strength to stated goals of appropriate musculature in order to improve functional independence. Strength of left hip 5/5 in all planes. [] Met  [] Not Met  [x] Progressing     Pain Rating at Best: 1/10 to improve Quality of Life.  [] Met  [] Not Met  [x] Progressing     Patient will meet predicted functional outcome (FOTO) score: 90% to increase self-worth & perceived functional ability.Gait without AD,without abnormalities. [] Met  [] Not Met  [x] Progressing     Patient will have met/partially met personal goal of: return to previous LOF, ambulate without AD. [] Met  [] Not Met  [x] Progressing         Plan     Continue with the plan of care established per initial evaluation    Plan of care Certification: 1/9/2024 to 4/12/24.     Outpatient Physical Therapy 2 times weekly for 12 weeks    Melissa Olmos  PTA

## 2024-02-28 ENCOUNTER — CLINICAL SUPPORT (OUTPATIENT)
Dept: REHABILITATION | Facility: HOSPITAL | Age: 74
End: 2024-02-28
Payer: MEDICARE

## 2024-02-28 DIAGNOSIS — R26.9 GAIT ABNORMALITY: Primary | ICD-10-CM

## 2024-02-28 PROCEDURE — 97112 NEUROMUSCULAR REEDUCATION: CPT | Mod: PO

## 2024-02-28 PROCEDURE — 97110 THERAPEUTIC EXERCISES: CPT | Mod: PO

## 2024-02-28 NOTE — PROGRESS NOTES
[facility]  Outpatient Physical Therapy Daily Treatment Note      [facility]  [facility] Number: [id number]  Visit Date: 2/28/2024    Therapy Diagnosis:   Encounter Diagnosis   Name Primary?    Gait abnormality Yes       Physician: Ari Lama, *    Physician Orders: PT Eval and Treat  2-3 times a week for 4-6 wks.  Medical Diagnosis from Referral: S/P hip replacement , left.  Sciatica of left side.  Evaluation Date: 1/9/2024  Authorization Period Expiration: 12/28/24.  Plan of Care Expiration: 4/12/24  Progress Note Due: 2/8/24  Date of Surgery: 12/13/23 LT VI  Visit # / Visits authorized: 8/ 20  PTA visit: 1/5    FOTO: 1/ 3 99/100  FOTO VISIT 8 65/100.     Precautions: Standard and Fall. Posterior approach.WBAT.     Time In: 1342  Time Out: 1425   Total Billable Time: 40 minutes    Subjective     The patient states that she is almost done with her hip precautions. She wants to start going to see massage therapist and chiropractor again.     Response to previous treatment: min soreness   Functional change: decreased dependence on prescription meds,gait improving.     Pain: 0/10  Location: left hip      Objective     May received therapeutic exercises to develop strength, endurance, ROM, flexibility, posture, and core stabilization for 10 minutes including:    Nutstep 10 min level 1    May participated in neuromuscular re-education activities to improve: Balance, Coordination, Kinesthetic, Sense, Proprioception, and Posture for 30 minutes. The following activities were included:Not performed due to pt late.  // bars    Heel raises 30 x   Gastroc stretch 3x30 L/R.  Standing hip extension and abduction with 20/20  Standing hamstring curls 2 x 15  3# weight today   Long arc quad 3# 2 x 15     Not performed today   Mini squats 30 airex  Marching 30/30 airex.  HR/TR 30 airex   Gastroc stretch 3x30 L/R.  Clams RTB 30  Hip ADD 30  w/ball.  Bridges 3 x 10 with cues for glute activation   SLR 3X10 AROM LT   Lateral  walks 4X10'.    Restisted reto walks 5 ft x 2 NP  Gait without AD 2X120'.    May participated in dynamic functional therapeutic activities to improve functional performance for 0  minutes, including:  Side stepping // bars  Gait without ' CGA      May participated in gait training to improve functional mobility and safety for 0 minutes, including:    Gait training with single point cane including navigation of uneven terrain, ramps, and curbs . CGA for safety       Patient Education and HEP     She was not compliant with home exercise program as she has not yet been issued one.    Education provided:   - home exercise program to be provided     Written Home Exercises Provided: yes.  Exercises were reviewed and May was able to demonstrate them prior to the end of the session.  May demonstrated good  understanding of the education provided.     See EMR under Patient Instructions for exercises provided  1/29/24 .    Assessment     Patient tolerated advancements in gait training with less restrictive device including navigation over uneven terrain including sidewalk, ramps, and ascending/descending curbs. CGA was maintained for safety however patient navigated all challenges without loss of balance.   Gait improving.  Pt will continue to benefit from skilled outpatient physical therapy to address the deficits listed in the problem list box on initial evaluation, provide pt/family education and to maximize pt's level of independence in the home and community environment.     May Is progressing well towards her goals.   Pt prognosis is Good.     Pt's spiritual, cultural and educational needs considered and pt agreeable to plan of care and goals.    Anticipated barriers to physical therapy: none    Goals: Goals:  SHORT TERM GOALS:  6 weeks  Progress Date met   Recent signs and systems trend is improving in order  to progress towards Long term goals.  [] Met  [] Not Met  [x] Progressing     Patient will be independent with Home Exercise Program  in order to further progress and return to maximal function. [] Met  [] Not Met  [x] Progressing     Pain rating at Worst: 5 /10 in order to progress towards increased independence with activity. [] Met  [] Not Met  [x] Progressing     Patient will be able to correct postural deviations in sitting and standing, to decrease pain and promote postural awareness for injury prevention.  [] Met  [] Not Met  [x] Progressing     Patient will improve functional outcome (FOTO) score: by 5% to increase self-worth & perceived functional ability towards long term goals [] Met  [] Not Met  [x] Progressing        LONG TERM GOALS: 12 weeks  Progress Date met   Patient will return to normal activites of daily living, recreational, and work related activities with less pain and limitation.  [] Met  [] Not Met  [x] Progressing     Patient will improve range of motion  to stated goals in order to return to maximal functional potential. ROM of left hip is WNL/WFL in all planes. [] Met  [] Not Met  [x] Progressing     Patient will improve Strength to stated goals of appropriate musculature in order to improve functional independence. Strength of left hip 5/5 in all planes. [] Met  [] Not Met  [x] Progressing     Pain Rating at Best: 1/10 to improve Quality of Life.  [] Met  [] Not Met  [x] Progressing     Patient will meet predicted functional outcome (FOTO) score: 90% to increase self-worth & perceived functional ability.Gait without AD,without abnormalities. [] Met  [] Not Met  [x] Progressing     Patient will have met/partially met personal goal of: return to previous LOF, ambulate without AD. [] Met  [] Not Met  [x] Progressing         Plan     Continue with the plan of care established per initial evaluation    Plan of care Certification: 1/9/2024 to 4/12/24.     Outpatient Physical Therapy 2 times  weekly for 12 weeks    Sekou Orellana, PT

## 2024-03-04 ENCOUNTER — CLINICAL SUPPORT (OUTPATIENT)
Dept: REHABILITATION | Facility: HOSPITAL | Age: 74
End: 2024-03-04
Payer: MEDICARE

## 2024-03-04 DIAGNOSIS — R26.9 GAIT ABNORMALITY: Primary | ICD-10-CM

## 2024-03-04 PROCEDURE — 97112 NEUROMUSCULAR REEDUCATION: CPT | Mod: PO,CQ

## 2024-03-04 PROCEDURE — 97110 THERAPEUTIC EXERCISES: CPT | Mod: PO,CQ

## 2024-03-04 NOTE — PROGRESS NOTES
[facility]  Outpatient Physical Therapy Daily Treatment Note      Name: May Solomon  Clinic Number: [id number]  Visit Date: 3/4/2024    Therapy Diagnosis:   Encounter Diagnosis   Name Primary?    Gait abnormality Yes       Physician: Ari Lama, *    Physician Orders: PT Eval and Treat  2-3 times a week for 4-6 wks.  Medical Diagnosis from Referral: S/P hip replacement , left.  Sciatica of left side.  Evaluation Date: 1/9/2024  Authorization Period Expiration: 12/28/24.  Plan of Care Expiration: 4/12/24  Progress Note Due: 2/8/24  Date of Surgery: 12/13/23 LT VI  Visit # / Visits authorized: 9/ 20  PTA visit: 1/5    FOTO: 1/ 3 99/100  FOTO VISIT 8 65/100.     Precautions: Standard and Fall. Posterior approach.WBAT.     Time In: 1345  Time Out: 1430   Total Billable Time: 40 minutes    Subjective     The patient states that she is using her cane consistently at home these days.     Response to previous treatment: min soreness   Functional change: decreased dependence on prescription meds,gait improving.     Pain: 0/10  Location: left hip      Objective     May received therapeutic exercises to develop strength, endurance, ROM, flexibility, posture, and core stabilization for 10 minutes including:    Nutstep 10 min level 1    May participated in neuromuscular re-education activities to improve: Balance, Coordination, Kinesthetic, Sense, Proprioception, and Posture for 30 minutes. The following activities were included:Not performed due to pt late.  // bars    Heel raises 30 x   Gastroc stretch 3x30 L/R.  Standing hip extension and abduction with 20/20  Standing hamstring curls 2 x 15  3# weight today   Long arc quad 3# 2 x 15       Mini squats 30 airex  Marching 30/30 airex.  HR/TR 30 airex   Gastroc stretch 3x30 L/R.  Clams RTB 30  Hip ADD 30 w/ball.  Bridges 3 x 10 with cues for glute activation   SLR 3X10 AROM LT   Lateral  walks  4X10'.    Restisted reto walks 5 ft x 2 NP  Gait without AD 2X120'.    May participated in dynamic functional therapeutic activities to improve functional performance for 0  minutes, including:  Side stepping // bars  Gait without ' CGA      May participated in gait training to improve functional mobility and safety for 0 minutes, including:    Gait training with single point cane including navigation of uneven terrain, ramps, and curbs . CGA for safety       Patient Education and HEP     She was not compliant with home exercise program as she has not yet been issued one.    Education provided:   - home exercise program to be provided     Written Home Exercises Provided: yes.  Exercises were reviewed and May was able to demonstrate them prior to the end of the session.  May demonstrated good  understanding of the education provided.     See EMR under Patient Instructions for exercises provided  1/29/24 .    Assessment     Patient was able to complete all recommended therapeutic exercises without incident or onset of pain. She is consistently improving functional mobility.   Pt will continue to benefit from skilled outpatient physical therapy to address the deficits listed in the problem list box on initial evaluation, provide pt/family education and to maximize pt's level of independence in the home and community environment.     May Is progressing well towards her goals.   Pt prognosis is Good.     Pt's spiritual, cultural and educational needs considered and pt agreeable to plan of care and goals.    Anticipated barriers to physical therapy: none    Goals: Goals:  SHORT TERM GOALS:  6 weeks  Progress Date met   Recent signs and systems trend is improving in order to progress towards Long term goals.  [] Met  [] Not Met  [x] Progressing     Patient will be independent with Home Exercise Program  in order to further progress and return to maximal function. [] Met  [] Not Met  [x]  Progressing     Pain rating at Worst: 5 /10 in order to progress towards increased independence with activity. [] Met  [] Not Met  [x] Progressing     Patient will be able to correct postural deviations in sitting and standing, to decrease pain and promote postural awareness for injury prevention.  [] Met  [] Not Met  [x] Progressing     Patient will improve functional outcome (FOTO) score: by 5% to increase self-worth & perceived functional ability towards long term goals [] Met  [] Not Met  [x] Progressing        LONG TERM GOALS: 12 weeks  Progress Date met   Patient will return to normal activites of daily living, recreational, and work related activities with less pain and limitation.  [] Met  [] Not Met  [x] Progressing     Patient will improve range of motion  to stated goals in order to return to maximal functional potential. ROM of left hip is WNL/WFL in all planes. [] Met  [] Not Met  [x] Progressing     Patient will improve Strength to stated goals of appropriate musculature in order to improve functional independence. Strength of left hip 5/5 in all planes. [] Met  [] Not Met  [x] Progressing     Pain Rating at Best: 1/10 to improve Quality of Life.  [] Met  [] Not Met  [x] Progressing     Patient will meet predicted functional outcome (FOTO) score: 90% to increase self-worth & perceived functional ability.Gait without AD,without abnormalities. [] Met  [] Not Met  [x] Progressing     Patient will have met/partially met personal goal of: return to previous LOF, ambulate without AD. [] Met  [] Not Met  [x] Progressing         Plan     Continue with the plan of care established per initial evaluation    Plan of care Certification: 1/9/2024 to 4/12/24.     Outpatient Physical Therapy 2 times weekly for 12 weeks    Melissa Olmos PTA

## 2024-03-06 ENCOUNTER — CLINICAL SUPPORT (OUTPATIENT)
Dept: REHABILITATION | Facility: HOSPITAL | Age: 74
End: 2024-03-06
Payer: MEDICARE

## 2024-03-06 DIAGNOSIS — R26.9 GAIT ABNORMALITY: Primary | ICD-10-CM

## 2024-03-06 PROCEDURE — 97110 THERAPEUTIC EXERCISES: CPT | Mod: PO

## 2024-03-06 PROCEDURE — 97112 NEUROMUSCULAR REEDUCATION: CPT | Mod: PO

## 2024-03-06 NOTE — PROGRESS NOTES
[facility]  Outpatient Physical Therapy Daily Treatment Note      Name: May Solomon  Clinic Number: [id number]  Visit Date: 3/6/2024    Therapy Diagnosis:   Encounter Diagnosis   Name Primary?    Gait abnormality Yes       Physician: Ari Lama, *    Physician Orders: PT Eval and Treat  2-3 times a week for 4-6 wks.  Medical Diagnosis from Referral: S/P hip replacement , left.  Sciatica of left side.  Evaluation Date: 1/9/2024  Authorization Period Expiration: 12/28/24.  Plan of Care Expiration: 4/12/24  Progress Note Due: 2/8/24  Date of Surgery: 12/13/23 LT VI  Visit # / Visits authorized: 9/ 20  PTA visit: 0/5    FOTO: 1/ 3 99/100  FOTO VISIT 8 65/100.     Precautions: Standard and Fall. Posterior approach.WBAT.     Time In: 1345  Time Out: 1425   Total Billable Time: 40 minutes    Subjective     The patient states that she is using her cane consistently at home these days.     Response to previous treatment: min soreness   Functional change: decreased dependence on prescription meds,gait improving.     Pain: 0/10  Location: left hip      Objective     May received therapeutic exercises to develop strength, endurance, ROM, flexibility, posture, and core stabilization for 10 minutes including:    Nutstep 10 min level 1    May participated in neuromuscular re-education activities to improve: Balance, Coordination, Kinesthetic, Sense, Proprioception, and Posture for 30 minutes. The following activities were included:Not performed due to pt late.  // bars    Heel raises 30 x   Gastroc stretch 3x30 L/R.  Standing hip extension and abduction with 20/20  Standing hamstring curls 2 x 15  3# weight today   Long arc quad 3# 2 x 15       Mini squats 30 airex  Marching 30/30 airex.  HR/TR 30 airex   Gastroc stretch 3x30 L/R.  Clams RTB 30  Hip ADD 30 w/ball.  Bridges 3 x 10 with cues for glute activation   SLR 3X10 AROM LT   Lateral  walks  4X10'.    Restisted reto walks 5 ft x 2 NP  Gait without AD 2X120'.    May participated in dynamic functional therapeutic activities to improve functional performance for 0  minutes, including:  Side stepping // bars  Gait without ' CGA.    May participated in gait training to improve functional mobility and safety for 0 minutes, including:    Gait training with single point cane including navigation of uneven terrain, ramps, and curbs . CGA for safety       Patient Education and HEP     She was not compliant with home exercise program as she has not yet been issued one.    Education provided:   - home exercise program to be provided     Written Home Exercises Provided: yes.  Exercises were reviewed and May was able to demonstrate them prior to the end of the session.  May demonstrated good  understanding of the education provided.     See EMR under Patient Instructions for exercises provided  1/29/24 .    Assessment     Patient was able to complete all recommended therapeutic exercises without incident or onset of pain. She is consistently improving functional mobility.   Pt will continue to benefit from skilled outpatient physical therapy to address the deficits listed in the problem list box on initial evaluation, provide pt/family education and to maximize pt's level of independence in the home and community environment.     May Is progressing well towards her goals.   Pt prognosis is Good.     Pt's spiritual, cultural and educational needs considered and pt agreeable to plan of care and goals.    Anticipated barriers to physical therapy: none    Goals: Goals:  SHORT TERM GOALS:  6 weeks  Progress Date met   Recent signs and systems trend is improving in order to progress towards Long term goals.  [] Met  [] Not Met  [x] Progressing     Patient will be independent with Home Exercise Program  in order to further progress and return to maximal function. [] Met  [] Not Met  [x] Progressing      Pain rating at Worst: 5 /10 in order to progress towards increased independence with activity. [] Met  [] Not Met  [x] Progressing     Patient will be able to correct postural deviations in sitting and standing, to decrease pain and promote postural awareness for injury prevention.  [] Met  [] Not Met  [x] Progressing     Patient will improve functional outcome (FOTO) score: by 5% to increase self-worth & perceived functional ability towards long term goals [] Met  [] Not Met  [x] Progressing        LONG TERM GOALS: 12 weeks  Progress Date met   Patient will return to normal activites of daily living, recreational, and work related activities with less pain and limitation.  [] Met  [] Not Met  [x] Progressing     Patient will improve range of motion  to stated goals in order to return to maximal functional potential. ROM of left hip is WNL/WFL in all planes. [] Met  [] Not Met  [x] Progressing     Patient will improve Strength to stated goals of appropriate musculature in order to improve functional independence. Strength of left hip 5/5 in all planes. [] Met  [] Not Met  [x] Progressing     Pain Rating at Best: 1/10 to improve Quality of Life.  [] Met  [] Not Met  [x] Progressing     Patient will meet predicted functional outcome (FOTO) score: 90% to increase self-worth & perceived functional ability.Gait without AD,without abnormalities. [] Met  [] Not Met  [x] Progressing     Patient will have met/partially met personal goal of: return to previous LOF, ambulate without AD. [] Met  [] Not Met  [x] Progressing         Plan     Continue with the plan of care established per initial evaluation    Plan of care Certification: 1/9/2024 to 4/12/24.     Outpatient Physical Therapy 2 times weekly for 12 weeks    Sekou Orellana, PT

## 2024-03-11 ENCOUNTER — CLINICAL SUPPORT (OUTPATIENT)
Dept: REHABILITATION | Facility: HOSPITAL | Age: 74
End: 2024-03-11
Payer: MEDICARE

## 2024-03-11 DIAGNOSIS — R26.9 GAIT ABNORMALITY: Primary | ICD-10-CM

## 2024-03-11 PROCEDURE — 97110 THERAPEUTIC EXERCISES: CPT | Mod: PO,CQ

## 2024-03-11 PROCEDURE — 97112 NEUROMUSCULAR REEDUCATION: CPT | Mod: PO,CQ

## 2024-03-11 NOTE — PROGRESS NOTES
[facility]  Outpatient Physical Therapy Daily Treatment Note      Name: May Solomon  Clinic Number: [id number]  Visit Date: 3/11/2024    Therapy Diagnosis:   Encounter Diagnosis   Name Primary?    Gait abnormality Yes       Physician: Ari Lama, *    Physician Orders: PT Eval and Treat  2-3 times a week for 4-6 wks.  Medical Diagnosis from Referral: S/P hip replacement , left.  Sciatica of left side.  Evaluation Date: 1/9/2024  Authorization Period Expiration: 12/28/24.  Plan of Care Expiration: 4/12/24  Progress Note Due: 2/8/24  Date of Surgery: 12/13/23 LT VI  Visit # / Visits authorized: 10/ 20  PTA visit: 1/5    FOTO: 1/ 3 99/100  FOTO VISIT 8 65/100.     Precautions: Standard and Fall. Posterior approach.WBAT.     Time In: 1345  Time Out: 1425   Total Billable Time: 40 minutes    Subjective     The patient reports no significant pain this morning.     Response to previous treatment: min soreness   Functional change: decreased dependence on prescription meds,gait improving.     Pain: 0/10  Location: left hip      Objective     May received therapeutic exercises to develop strength, endurance, ROM, flexibility, posture, and core stabilization for 10 minutes including:    Nutstep 10 min level 1    May participated in neuromuscular re-education activities to improve: Balance, Coordination, Kinesthetic, Sense, Proprioception, and Posture for 30 minutes. The following activities were included:  // bars    Heel raises 30 x   Gastroc stretch 3x30 L/R.  Standing hip extension and abduction with 20/20  Standing hamstring curls 2 x 15  3# weight today   Long arc quad 3# 2 x 15     Resisted lateral walks with OTB 10 ft x 5   Monster walks OTB around thighs 10 ft x 10 FWD and 10 ft BCK 10 x   Mini squats 30 airex  Marching 30/30 airex.  HR/TR 30 airex   Gastroc stretch 3x30 L/R.  Clams RTB 30  Hip ADD 30 w/ball.  Bridges 3 x 10 with cues  for glute activation   SLR 3X10 AROM LT     Gait without AD 2X120'.    May participated in dynamic functional therapeutic activities to improve functional performance for 0  minutes, including:  Side stepping // bars  Gait without ' CGA.    May participated in gait training to improve functional mobility and safety for 0 minutes, including:    Gait training with single point cane including navigation of uneven terrain, ramps, and curbs . CGA for safety       Patient Education and HEP     She was not compliant with home exercise program as she has not yet been issued one.    Education provided:   - home exercise program to be provided     Written Home Exercises Provided: yes.  Exercises were reviewed and May was able to demonstrate them prior to the end of the session.  May demonstrated good  understanding of the education provided.     See EMR under Patient Instructions for exercises provided  1/29/24 .    Assessment     Patient was able to complete all recommended therapeutic exercises without incident or onset of pain. She is consistently improving functional mobility. Today she was able to increase resistance on lateral, fwd and back walking in // bars. She is also demonstrating improved gait mechanics when walking without Assistive device.  Pt will continue to benefit from skilled outpatient physical therapy to address the deficits listed in the problem list box on initial evaluation, provide pt/family education and to maximize pt's level of independence in the home and community environment.     May Is progressing well towards her goals.   Pt prognosis is Good.     Pt's spiritual, cultural and educational needs considered and pt agreeable to plan of care and goals.    Anticipated barriers to physical therapy: none    Goals: Goals:  SHORT TERM GOALS:  6 weeks  Progress Date met   Recent signs and systems trend is improving in order to progress towards Long term goals.  [] Met  [] Not  Met  [x] Progressing     Patient will be independent with Home Exercise Program  in order to further progress and return to maximal function. [] Met  [] Not Met  [x] Progressing     Pain rating at Worst: 5 /10 in order to progress towards increased independence with activity. [] Met  [] Not Met  [x] Progressing     Patient will be able to correct postural deviations in sitting and standing, to decrease pain and promote postural awareness for injury prevention.  [] Met  [] Not Met  [x] Progressing     Patient will improve functional outcome (FOTO) score: by 5% to increase self-worth & perceived functional ability towards long term goals [] Met  [] Not Met  [x] Progressing        LONG TERM GOALS: 12 weeks  Progress Date met   Patient will return to normal activites of daily living, recreational, and work related activities with less pain and limitation.  [] Met  [] Not Met  [x] Progressing     Patient will improve range of motion  to stated goals in order to return to maximal functional potential. ROM of left hip is WNL/WFL in all planes. [] Met  [] Not Met  [x] Progressing     Patient will improve Strength to stated goals of appropriate musculature in order to improve functional independence. Strength of left hip 5/5 in all planes. [] Met  [] Not Met  [x] Progressing     Pain Rating at Best: 1/10 to improve Quality of Life.  [] Met  [] Not Met  [x] Progressing     Patient will meet predicted functional outcome (FOTO) score: 90% to increase self-worth & perceived functional ability.Gait without AD,without abnormalities. [] Met  [] Not Met  [x] Progressing     Patient will have met/partially met personal goal of: return to previous LOF, ambulate without AD. [] Met  [] Not Met  [x] Progressing         Plan     Continue with the plan of care established per initial evaluation    Plan of care Certification: 1/9/2024 to 4/12/24.     Outpatient Physical Therapy 2 times weekly for 12 weeks    Melissa Olmos PTA

## 2024-03-18 ENCOUNTER — CLINICAL SUPPORT (OUTPATIENT)
Dept: REHABILITATION | Facility: HOSPITAL | Age: 74
End: 2024-03-18
Payer: MEDICARE

## 2024-03-18 DIAGNOSIS — R26.9 GAIT ABNORMALITY: Primary | ICD-10-CM

## 2024-03-18 PROCEDURE — 97112 NEUROMUSCULAR REEDUCATION: CPT | Mod: PO,CQ

## 2024-03-18 PROCEDURE — 97110 THERAPEUTIC EXERCISES: CPT | Mod: PO,CQ

## 2024-03-18 NOTE — PROGRESS NOTES
[facility]  Outpatient Physical Therapy Daily Treatment Note      [facility]  [facility] Number: [id number]  Visit Date: 3/18/2024    Therapy Diagnosis:   Encounter Diagnosis   Name Primary?    Gait abnormality Yes       Physician: Ari Lama, *    Physician Orders: PT Eval and Treat  2-3 times a week for 4-6 wks.  Medical Diagnosis from Referral: S/P hip replacement , left.  Sciatica of left side.  Evaluation Date: 1/9/2024  Authorization Period Expiration: 12/28/24.  Plan of Care Expiration: 4/12/24  Progress Note Due: 2/8/24  Date of Surgery: 12/13/23 LT VI  Visit # / Visits authorized: 11/ 20  PTA visit: 2/5    FOTO: 2/ 3 99/100   2nd FOTO done 3/18/24  FOTO VISIT 8 65/100.     Precautions: Standard and Fall. Posterior approach.WBAT.     Time In: 1345  Time Out: 1425   Total Billable Time: 40 minutes    Subjective     The patient reports she is walking without an assistive device at home and is now using SPC in the community.     Response to previous treatment: min soreness   Functional change: decreased dependence on prescription meds,gait improving.     Pain: 0/10  Location: left hip      Objective     May received therapeutic exercises to develop strength, endurance, ROM, flexibility, posture, and core stabilization for 10 minutes including:    Nutstep 10 min level 1    May participated in neuromuscular re-education activities to improve: Balance, Coordination, Kinesthetic, Sense, Proprioception, and Posture for 30 minutes. The following activities were included:  // bars    Heel raises 30 x   Gastroc stretch 3x30 L/R.  Standing hip extension and abduction with 20/20  Standing hamstring curls 2 x 15  3# weight today   Long arc quad 3# 2 x 15   Resisted lateral walks with GTB 10 ft x 5   Monster walks OTB around thighs 10 ft x 10 FWD and 10 ft BCK 10 x     Mini squats 30 airex  Marching 30/30 airex.  HR/TR 30 airex   Gastroc  stretch 3x30 L/R.  Clams RTB 30  Hip ADD 30 w/ball.  Bridges 3 x 10 with cues for glute activation   SLR 3X10 AROM LT     Gait without AD 2X120'.    May participated in dynamic functional therapeutic activities to improve functional performance for 0  minutes, including:  Side stepping // bars  Gait without ' CGA.    May participated in gait training to improve functional mobility and safety for 0 minutes, including:    Gait training with single point cane including navigation of uneven terrain, ramps, and curbs . CGA for safety       Patient Education and HEP     She was not compliant with home exercise program as she has not yet been issued one.    Education provided:   - home exercise program to be provided     Written Home Exercises Provided: yes.  Exercises were reviewed and May was able to demonstrate them prior to the end of the session.  May demonstrated good  understanding of the education provided.     See EMR under Patient Instructions for exercises provided  1/29/24 .    Assessment     Patient presented to clinic without rolling walker instead using a fixed single point cane. The cane that she is using is too long for her height and can not be adjusted however patient ambulates with steady gait and no loss of balance.     Pt will continue to benefit from skilled outpatient physical therapy to address the deficits listed in the problem list box on initial evaluation, provide pt/family education and to maximize pt's level of independence in the home and community environment.     May Is progressing well towards her goals.   Pt prognosis is Good.     Pt's spiritual, cultural and educational needs considered and pt agreeable to plan of care and goals.    Anticipated barriers to physical therapy: none    Goals: Goals:  SHORT TERM GOALS:  6 weeks  Progress Date met   Recent signs and systems trend is improving in order to progress towards Long term goals.  [] Met  [] Not Met  [x]  Progressing     Patient will be independent with Home Exercise Program  in order to further progress and return to maximal function. [] Met  [] Not Met  [x] Progressing     Pain rating at Worst: 5 /10 in order to progress towards increased independence with activity. [] Met  [] Not Met  [x] Progressing     Patient will be able to correct postural deviations in sitting and standing, to decrease pain and promote postural awareness for injury prevention.  [] Met  [] Not Met  [x] Progressing     Patient will improve functional outcome (FOTO) score: by 5% to increase self-worth & perceived functional ability towards long term goals [] Met  [] Not Met  [x] Progressing        LONG TERM GOALS: 12 weeks  Progress Date met   Patient will return to normal activites of daily living, recreational, and work related activities with less pain and limitation.  [] Met  [] Not Met  [x] Progressing     Patient will improve range of motion  to stated goals in order to return to maximal functional potential. ROM of left hip is WNL/WFL in all planes. [] Met  [] Not Met  [x] Progressing     Patient will improve Strength to stated goals of appropriate musculature in order to improve functional independence. Strength of left hip 5/5 in all planes. [] Met  [] Not Met  [x] Progressing     Pain Rating at Best: 1/10 to improve Quality of Life.  [] Met  [] Not Met  [x] Progressing     Patient will meet predicted functional outcome (FOTO) score: 90% to increase self-worth & perceived functional ability.Gait without AD,without abnormalities. [] Met  [] Not Met  [x] Progressing     Patient will have met/partially met personal goal of: return to previous LOF, ambulate without AD. [] Met  [] Not Met  [x] Progressing         Plan     Continue with the plan of care established per initial evaluation    Plan of care Certification: 1/9/2024 to 4/12/24.     Outpatient Physical Therapy 2 times weekly for 12 weeks    Melissa Olmos PTA

## 2024-03-20 LAB
LEFT EYE DM RETINOPATHY: POSITIVE
RIGHT EYE DM RETINOPATHY: POSITIVE

## 2024-03-22 ENCOUNTER — CLINICAL SUPPORT (OUTPATIENT)
Dept: REHABILITATION | Facility: HOSPITAL | Age: 74
End: 2024-03-22
Payer: MEDICARE

## 2024-03-22 DIAGNOSIS — R26.9 GAIT ABNORMALITY: Primary | ICD-10-CM

## 2024-03-22 PROCEDURE — 97110 THERAPEUTIC EXERCISES: CPT | Mod: PO

## 2024-03-22 PROCEDURE — 97112 NEUROMUSCULAR REEDUCATION: CPT | Mod: PO

## 2024-03-22 NOTE — PROGRESS NOTES
[facility]  Outpatient Physical Therapy Daily Treatment Note      [facility]  Cl[facility] Number: [id number]  Visit Date: 3/22/2024    Therapy Diagnosis:   Encounter Diagnosis   Name Primary?    Gait abnormality Yes       Physician: Ari Lama, *    Physician Orders: PT Eval and Treat  2-3 times a week for 4-6 wks.  Medical Diagnosis from Referral: S/P hip replacement , left.  Sciatica of left side.  Evaluation Date: 1/9/2024  Authorization Period Expiration: 12/28/24.  Plan of Care Expiration: 4/12/24  Progress Note Due: 2/8/24  Date of Surgery: 12/13/23 LT VI  Visit # / Visits authorized: 11/ 20  PTA visit: 2/5    FOTO: 2/ 3 99/100   2nd FOTO done 3/18/24  FOTO VISIT 8 65/100.     Precautions: Standard and Fall. Posterior approach.WBAT.     Time In: 1042  Time Out: 1124   Total Billable Time: 40 minutes    Subjective     The patient reports she is walking without an assistive device at home and is now using SPC in the community.     Response to previous treatment: min soreness   Functional change: gait improving.Ambulating with cane.     Pain: 0/10  Location: left hip      Objective     May received therapeutic exercises to develop strength, endurance, ROM, flexibility, posture, and core stabilization for 10 minutes including:    Nutstep 10 min level 1    May participated in neuromuscular re-education activities to improve: Balance, Coordination, Kinesthetic, Sense, Proprioception, and Posture for 30 minutes. The following activities were included:  // bars    Heel raises 30 x   Gastroc stretch 3x30 L/R.  Standing hip extension and abduction with 20/20  Standing hamstring curls 2 x 15  3# weight today   Long arc quad 3# 2 x 15   Resisted lateral walks with GTB 10 ft x 5   Monster walks OTB around thighs 10 ft x 10 FWD and 10 ft BCK 10 x     Mini squats 30 airex  Marching 30/30 airex.  HR/TR 30 airex   Gastroc stretch 3x30  L/R.  Mat;  Clams RTB 30  Hip ADD 30 w/ball.  Bridges 3 x 10 with cues for glute activation   SLR 3X10 AROM LT     Gait without AD 2X120'.    May participated in dynamic functional therapeutic activities to improve functional performance for 0  minutes, including:  Side stepping // bars  Gait without ' CGA.    May participated in gait training to improve functional mobility and safety for 0 minutes, including:    Gait training with single point cane including navigation of uneven terrain, ramps, and curbs . CGA for safety       Patient Education and HEP     She was not compliant with home exercise program as she has not yet been issued one.    Education provided:   - home exercise program to be provided     Written Home Exercises Provided: yes.  Exercises were reviewed and May was able to demonstrate them prior to the end of the session.  May demonstrated good  understanding of the education provided.     See EMR under Patient Instructions for exercises provided  1/29/24 .    Assessment     Patient presented to clinic without rolling walker instead using a fixed single point cane. The cane that she is using is too long for her height and can not be adjusted however patient ambulates with steady gait and no loss of balance.     Pt will continue to benefit from skilled outpatient physical therapy to address the deficits listed in the problem list box on initial evaluation, provide pt/family education and to maximize pt's level of independence in the home and community environment.     May Is progressing well towards her goals.   Pt prognosis is Good.     Pt's spiritual, cultural and educational needs considered and pt agreeable to plan of care and goals.    Anticipated barriers to physical therapy: none    Goals: Goals:  SHORT TERM GOALS:  6 weeks  Progress Date met   Recent signs and systems trend is improving in order to progress towards Long term goals.  [] Met  [] Not Met  [x] Progressing      Patient will be independent with Home Exercise Program  in order to further progress and return to maximal function. [] Met  [] Not Met  [x] Progressing     Pain rating at Worst: 5 /10 in order to progress towards increased independence with activity. [] Met  [] Not Met  [x] Progressing     Patient will be able to correct postural deviations in sitting and standing, to decrease pain and promote postural awareness for injury prevention.  [] Met  [] Not Met  [x] Progressing     Patient will improve functional outcome (FOTO) score: by 5% to increase self-worth & perceived functional ability towards long term goals [] Met  [] Not Met  [x] Progressing        LONG TERM GOALS: 12 weeks  Progress Date met   Patient will return to normal activites of daily living, recreational, and work related activities with less pain and limitation.  [] Met  [] Not Met  [x] Progressing     Patient will improve range of motion  to stated goals in order to return to maximal functional potential. ROM of left hip is WNL/WFL in all planes. [] Met  [] Not Met  [x] Progressing     Patient will improve Strength to stated goals of appropriate musculature in order to improve functional independence. Strength of left hip 5/5 in all planes. [] Met  [] Not Met  [x] Progressing     Pain Rating at Best: 1/10 to improve Quality of Life.  [] Met  [] Not Met  [x] Progressing     Patient will meet predicted functional outcome (FOTO) score: 90% to increase self-worth & perceived functional ability.Gait without AD,without abnormalities. [] Met  [] Not Met  [x] Progressing     Patient will have met/partially met personal goal of: return to previous LOF, ambulate without AD. [] Met  [] Not Met  [x] Progressing         Plan     Continue with the plan of care established per initial evaluation    Plan of care Certification: 1/9/2024 to 4/12/24.     Outpatient Physical Therapy 2 times weekly for 12 weeks    Sekou Orellana, PT

## 2024-03-25 ENCOUNTER — CLINICAL SUPPORT (OUTPATIENT)
Dept: REHABILITATION | Facility: HOSPITAL | Age: 74
End: 2024-03-25
Payer: MEDICARE

## 2024-03-25 DIAGNOSIS — R26.9 GAIT ABNORMALITY: Primary | ICD-10-CM

## 2024-03-25 PROCEDURE — 97110 THERAPEUTIC EXERCISES: CPT | Mod: PO,CQ

## 2024-03-25 PROCEDURE — 97112 NEUROMUSCULAR REEDUCATION: CPT | Mod: PO,CQ

## 2024-03-25 NOTE — PROGRESS NOTES
[facility]  Outpatient Physical Therapy Daily Treatment Note      [facility]  [facility] Number: [id number]  Visit Date: 3/25/2024    Therapy Diagnosis:   Encounter Diagnosis   Name Primary?    Gait abnormality Yes       Physician: Ari Lama, *    Physician Orders: PT Eval and Treat  2-3 times a week for 4-6 wks.  Medical Diagnosis from Referral: S/P hip replacement , left.  Sciatica of left side.  Evaluation Date: 1/9/2024  Authorization Period Expiration: 12/28/24.  Plan of Care Expiration: 4/12/24  Progress Note Due: 2/8/24  Date of Surgery: 12/13/23 LT VI  Visit # / Visits authorized: 18/ 20  PTA visit: 1/5    FOTO: 2/ 3 99/100   2nd FOTO done 3/18/24  FOTO VISIT 8 65/100.     Precautions: Standard and Fall. Posterior approach.WBAT.     Time In: 145  Time Out: 230   Total Billable Time: 40 minutes    Subjective     The patient reports she is walking without an assistive device at home and is now using SPC in the community. She is scheduled to see her doctor tomorrow.     Response to previous treatment: min soreness   Functional change: gait improving.Ambulating with cane.     Pain: 0/10  Location: left hip      Objective     May received therapeutic exercises to develop strength, endurance, ROM, flexibility, posture, and core stabilization for 10 minutes including:    Nutstep 10 min level 1    May participated in neuromuscular re-education activities to improve: Balance, Coordination, Kinesthetic, Sense, Proprioception, and Posture for 30 minutes. The following activities were included:  // bars    Heel raises 30 x   Gastroc stretch 3x30 L/R.  Standing hip extension and abduction with 20/20  Standing hamstring curls 2 x 15  3# weight today   Long arc quad 3# 2 x 15   Resisted lateral walks with GTB 10 ft x 5   Monster walks OTB around thighs 10 ft x 10 FWD and 10 ft BCK 10 x     Mini squats 30 airex  Marching 30/30  airex.  HR/TR 30 airex   Gastroc stretch 3x30 L/R.  Mat;  Clams RTB 30  Hip ADD 30 w/ball.  Bridges 3 x 10 with cues for glute activation   SLR 3X10 AROM LT     Gait without AD including outdoor ambulation over uneven terrain and navigation of ramps.     May participated in dynamic functional therapeutic activities to improve functional performance for 0  minutes, including:  Side stepping // bars  Gait without ' CGA.    May participated in gait training to improve functional mobility and safety for 0 minutes, including:    Gait training with single point cane including navigation of uneven terrain, ramps, and curbs . CGA for safety       Patient Education and HEP     She was not compliant with home exercise program as she has not yet been issued one.    Education provided:   - home exercise program to be provided     Written Home Exercises Provided: yes.  Exercises were reviewed and May was able to demonstrate them prior to the end of the session.  May demonstrated good  understanding of the education provided.     See EMR under Patient Instructions for exercises provided  1/29/24 .    Assessment     Patient was able to perform gait training without loss of balance including navigation of several environmental obstacles including ramps and uneven ground.     Pt will continue to benefit from skilled outpatient physical therapy to address the deficits listed in the problem list box on initial evaluation, provide pt/family education and to maximize pt's level of independence in the home and community environment.     May Is progressing well towards her goals.   Pt prognosis is Good.     Pt's spiritual, cultural and educational needs considered and pt agreeable to plan of care and goals.    Anticipated barriers to physical therapy: none    Goals: Goals:  SHORT TERM GOALS:  6 weeks  Progress Date met   Recent signs and systems trend is improving in order to progress towards Long term goals.  []  Met  [] Not Met  [x] Progressing     Patient will be independent with Home Exercise Program  in order to further progress and return to maximal function. [] Met  [] Not Met  [x] Progressing     Pain rating at Worst: 5 /10 in order to progress towards increased independence with activity. [] Met  [] Not Met  [x] Progressing     Patient will be able to correct postural deviations in sitting and standing, to decrease pain and promote postural awareness for injury prevention.  [] Met  [] Not Met  [x] Progressing     Patient will improve functional outcome (FOTO) score: by 5% to increase self-worth & perceived functional ability towards long term goals [] Met  [] Not Met  [x] Progressing        LONG TERM GOALS: 12 weeks  Progress Date met   Patient will return to normal activites of daily living, recreational, and work related activities with less pain and limitation.  [] Met  [] Not Met  [x] Progressing     Patient will improve range of motion  to stated goals in order to return to maximal functional potential. ROM of left hip is WNL/WFL in all planes. [] Met  [] Not Met  [x] Progressing     Patient will improve Strength to stated goals of appropriate musculature in order to improve functional independence. Strength of left hip 5/5 in all planes. [] Met  [] Not Met  [x] Progressing     Pain Rating at Best: 1/10 to improve Quality of Life.  [] Met  [] Not Met  [x] Progressing     Patient will meet predicted functional outcome (FOTO) score: 90% to increase self-worth & perceived functional ability.Gait without AD,without abnormalities. [] Met  [] Not Met  [x] Progressing     Patient will have met/partially met personal goal of: return to previous LOF, ambulate without AD. [] Met  [] Not Met  [x] Progressing         Plan     Continue with the plan of care established per initial evaluation    Plan of care Certification: 1/9/2024 to 4/12/24.     Outpatient Physical Therapy 2 times weekly for 12 weeks    Melissa Olmos PTA

## 2024-03-26 ENCOUNTER — OFFICE VISIT (OUTPATIENT)
Dept: ORTHOPEDICS | Facility: CLINIC | Age: 74
End: 2024-03-26
Payer: MEDICARE

## 2024-03-26 VITALS — WEIGHT: 166.88 LBS | HEIGHT: 63 IN | BODY MASS INDEX: 29.57 KG/M2

## 2024-03-26 DIAGNOSIS — Z96.642 STATUS POST HIP REPLACEMENT, LEFT: Primary | ICD-10-CM

## 2024-03-26 DIAGNOSIS — M17.12 PRIMARY OSTEOARTHRITIS OF LEFT KNEE: ICD-10-CM

## 2024-03-26 PROCEDURE — 1125F AMNT PAIN NOTED PAIN PRSNT: CPT | Mod: CPTII,S$GLB,, | Performed by: ORTHOPAEDIC SURGERY

## 2024-03-26 PROCEDURE — 3044F HG A1C LEVEL LT 7.0%: CPT | Mod: CPTII,S$GLB,, | Performed by: ORTHOPAEDIC SURGERY

## 2024-03-26 PROCEDURE — 1100F PTFALLS ASSESS-DOCD GE2>/YR: CPT | Mod: CPTII,S$GLB,, | Performed by: ORTHOPAEDIC SURGERY

## 2024-03-26 PROCEDURE — 1160F RVW MEDS BY RX/DR IN RCRD: CPT | Mod: CPTII,S$GLB,, | Performed by: ORTHOPAEDIC SURGERY

## 2024-03-26 PROCEDURE — 3288F FALL RISK ASSESSMENT DOCD: CPT | Mod: CPTII,S$GLB,, | Performed by: ORTHOPAEDIC SURGERY

## 2024-03-26 PROCEDURE — 20610 DRAIN/INJ JOINT/BURSA W/O US: CPT | Mod: LT,S$GLB,, | Performed by: ORTHOPAEDIC SURGERY

## 2024-03-26 PROCEDURE — 99213 OFFICE O/P EST LOW 20 MIN: CPT | Mod: 25,S$GLB,, | Performed by: ORTHOPAEDIC SURGERY

## 2024-03-26 PROCEDURE — 1159F MED LIST DOCD IN RCRD: CPT | Mod: CPTII,S$GLB,, | Performed by: ORTHOPAEDIC SURGERY

## 2024-03-26 RX ORDER — TRIAMCINOLONE ACETONIDE 40 MG/ML
40 INJECTION, SUSPENSION INTRA-ARTICULAR; INTRAMUSCULAR
Status: DISCONTINUED | OUTPATIENT
Start: 2024-03-26 | End: 2024-03-26 | Stop reason: HOSPADM

## 2024-03-26 RX ADMIN — TRIAMCINOLONE ACETONIDE 40 MG: 40 INJECTION, SUSPENSION INTRA-ARTICULAR; INTRAMUSCULAR at 01:03

## 2024-03-26 NOTE — PROGRESS NOTES
[facility]    Subjective:     Chief Complaint:   Chief Complaint   Patient presents with    Left Hip - Pain     Patient is here for a f/up on Left VI 12.13.2023 & PT status, States her pain is better than her last visit.        Past Medical History:   Diagnosis Date    Basal cell carcinoma     Diabetes mellitus, type 2     History of colonic polyps     HTN (hypertension)     Hyperlipidemia        Past Surgical History:   Procedure Laterality Date    ARTHROSCOPY OF KNEE Right 2013    BREAST BIOPSY      COLONOSCOPY  05/2017    EYE SURGERY Right 05/2021    CATARACT    HIP REPLACEMENT ARTHROPLASTY Left 12/13/2023    Procedure: ARTHROPLASTY, HIP REPLACEMENT;  Surgeon: Sachin Tong MD;  Location: [facility];  Service: Orthopedics;  Laterality: Left;    KNEE ARTHROSCOPY W/ MENISCECTOMY Left 9/13/2023    Procedure: ARTHROSCOPY, KNEE, WITH MENISCECTOMY;  Surgeon: Sachin Tong MD;  Location: [facility] OR;  Service: Orthopedics;  Laterality: Left;    TRIGGER FINGER RELEASE Left 10/26/2022    Procedure: RELEASE, TRIGGER FINGER;  Surgeon: Sachin Tong MD;  Location: [facility];  Service: Orthopedics;  Laterality: Left;       Current Outpatient Medications   Medication Sig    calcium carbonate (OS-SANDEEP) 500 mg calcium (1,250 mg) chewable tablet Take 1 tablet by mouth once daily.    losartan (COZAAR) 50 MG tablet Take 1 tablet (50 mg total) by mouth once daily.    metFORMIN (GLUCOPHAGE) 500 MG tablet Take 1 tablet (500 mg total) by mouth 2 (two) times daily with meals.    multivitamin (THERAGRAN) per tablet Take 1 tablet by mouth once daily.    rosuvastatin (CRESTOR) 10 MG tablet Take 1 tablet (10 mg total) by mouth once daily.     No current facility-administered medications for this visit.       Review of patient's allergies indicates:   Allergen Reactions    Sulfa (sulfonamide antibiotics) Rash       Family History   Problem Relation Age of Onset    Colon cancer Mother 76    Hypertension Mother     Obesity Mother      Prostate cancer Father     Breast cancer Neg Hx        Social History     Socioeconomic History    Marital status:    Occupational History    Occupation: retired   Tobacco Use    Smoking status: Never    Smokeless tobacco: Never   Substance and Sexual Activity    Alcohol use: Yes     Comment: occasional    Drug use: Never    Sexual activity: Not Currently   Social History Narrative    Live with son     Social Determinants of Health     Financial Resource Strain: Low Risk  (2/19/2024)    Overall Financial Resource Strain (CARDIA)     Difficulty of Paying Living Expenses: Not hard at all   Food Insecurity: No Food Insecurity (2/19/2024)    Hunger Vital Sign     Worried About Running Out of Food in the Last Year: Never true     Ran Out of Food in the Last Year: Never true   Transportation Needs: No Transportation Needs (2/19/2024)    PRAPARE - Transportation     Lack of Transportation (Medical): No     Lack of Transportation (Non-Medical): No   Physical Activity: Inactive (2/19/2024)    Exercise Vital Sign     Days of Exercise per Week: 0 days     Minutes of Exercise per Session: 0 min   Stress: No Stress Concern Present (2/19/2024)    [employer] - Occupational Stress Questionnaire     Feeling of Stress : Not at all   Social Connections: Unknown (2/19/2024)    Social Connection and Isolation Panel [NHANES]     Frequency of Communication with Friends and Family: More than three times a week     Frequency of Social Gatherings with Friends and Family: Once a week     Active Member of Clubs or Organizations: No     Attends Club or Organization Meetings: Never     Marital Status:    Housing Stability: Low Risk  (2/19/2024)    Housing Stability Vital Sign     Unable to Pay for Housing in the Last Year: No     Number of Places Lived in the Last Year: 2     Unstable Housing in the Last Year: No       History of present illness:  May comes in today for follow-up for her left total  hip arthroplasty secondary to femoral neck fracture.  She is about 3.5 months postop from surgery.  She has done very well postoperatively.  She is very pleased with her result.  Her pain is much improved now from where she was preoperatively.    Review of Systems:    Constitution: Negative for chills, fever, and sweats.  Negative for unexplained weight loss.    HENT:  Negative for headaches and blurry vision.    Cardiovascular:Negative for chest pain or irregular heart beat. Negative for hypertension.    Respiratory:  Negative for cough and shortness of breath.    Gastrointestinal: Negative for abdominal pain, heartburn, melena, nausea, and vomitting.    Genitourinary:  Negative bladder incontinence and dysuria.    Musculoskeletal:  See HPI for details.     Neurological: Negative for numbness.    Psychiatric/Behavioral: Negative for depression.  The patient is not nervous/anxious.      Endocrine: Negative for polyuria    Hematologic/Lymphatic: Negative for bleeding problem.  Does not bruise/bleed easily.    Skin: Negative for poor would healing and rash    Objective:      Physical Examination:    Vital Signs:  There were no vitals filed for this visit.    Body mass index is 29.56 kg/m².    This a well-developed, well nourished patient in no acute distress.  They are alert and oriented and cooperative to examination.        Left hip/knee exam: Skin to left hip is clean intact. No erythema or ecchymosis. No signs or symptoms of infection. Left lateral hip incision well healed without wound dehiscence or drainage. Left calf is soft and nontender. She is neurovascularly intact throughout the left lower extremity. She can weightbear as tolerated left lower extremity. She ambulates with a single prong cane.  Left knee range of motion 3-120 degrees.  It is stable to varus and valgus stresses.  She has diffuse crepitus with range of motioning.  Diffusely tender both medially and laterally.    Pertinent New Results:    XRAY  "Report / Interpretation:   Two views taken of the left hip today:  AP and lateral views.  They reveal no acute fractures or dislocations.  Visualized soft tissues appear unremarkable.  She has an anatomically placed left total hip arthroplasty without evidence of hardware failure or subsidence.    Assessment/Plan:      1. History of left total hip arthroplasty.    2. Left knee osteoarthritis.    For the left knee, I injected today via an anterolateral approach with 40 mg of Kenalog and lidocaine.  She tolerated this well.  For the left hip, recommend she complete the current prescription of physical therapy then resume/continue her regular activities of daily living without restriction.  Encouraged ambulating.  Encouraged weaning off the cane.  We will check her back again in 3 months to see how she is doing.  Ultimately, she will likely need a total knee arthroplasty on the left.    Ari Lama, Physician Assistant, served in the capacity as a "scribe" for this patient encounter.  A "face-to-face" encounter occurred with Dr. Sachin Tong on this date.  The treatment plan and medical decision-making is outlined above. Patient was seen and examined with a chaperone.       This note was created using Dragon voice recognition software that occasionally misinterpreted phrases or words.          "

## 2024-03-26 NOTE — PROCEDURES
Large Joint Aspiration/Injection: L knee    Date/Time: 3/26/2024 1:15 PM    Performed by: Sachin Tong MD  Authorized by: Sachin Tong MD    Consent Done?:  Yes (Verbal)  Indications:  Arthritis and pain  Site marked: the procedure site was marked    Timeout: prior to procedure the correct patient, procedure, and site was verified    Prep: patient was prepped and draped in usual sterile fashion      Local anesthesia used?: Yes    Local anesthetic:  Lidocaine 1% without epinephrine    Details:  Needle Size:  25 G  Ultrasonic Guidance for needle placement?: No    Location:  Knee  Site:  L knee  Medications:  40 mg triamcinolone acetonide 40 mg/mL  Patient tolerance:  Patient tolerated the procedure well with no immediate complications

## 2024-03-27 ENCOUNTER — CLINICAL SUPPORT (OUTPATIENT)
Dept: REHABILITATION | Facility: HOSPITAL | Age: 74
End: 2024-03-27
Payer: MEDICARE

## 2024-03-27 DIAGNOSIS — R26.9 GAIT ABNORMALITY: Primary | ICD-10-CM

## 2024-03-27 PROCEDURE — 97530 THERAPEUTIC ACTIVITIES: CPT | Mod: PO

## 2024-03-27 PROCEDURE — 97112 NEUROMUSCULAR REEDUCATION: CPT | Mod: PO

## 2024-03-27 PROCEDURE — 97110 THERAPEUTIC EXERCISES: CPT | Mod: PO

## 2024-03-27 NOTE — PROGRESS NOTES
[facility]  Outpatient Physical Therapy Daily Treatment Note      [facility]  [facility] Number: [id number]  Visit Date: 3/27/2024    Therapy Diagnosis:   Encounter Diagnosis   Name Primary?    Gait abnormality Yes       Physician: Ari Lama, *    Physician Orders: PT Eval and Treat  2-3 times a week for 4-6 wks.  Medical Diagnosis from Referral: S/P hip replacement , left.  Sciatica of left side.  Evaluation Date: 1/9/2024  Authorization Period Expiration: 12/28/24.  Plan of Care Expiration: 4/12/24  Progress Note Due: 2/8/24  Date of Surgery: 12/13/23 LT VI  Visit # / Visits authorized: 18/ 20  PTA visit: 0/5    FOTO: 2/ 3 99/100   2nd FOTO done 3/18/24  FOTO VISIT 8 65/100.     Precautions: Standard and Fall. Posterior approach.WBAT.     Time In: 1341  Time Out:1434  Total Billable Time: 53 minutes    Subjective     The patient reports she is walking without an assistive device at home and is now using SPC in the community. She is scheduled to see her doctor tomorrow.     Response to previous treatment: min soreness   Functional change: gait improving.Ambulating with cane.     Pain: 0/10  Location: left hip      Objective     May received therapeutic exercises to develop strength, endurance, ROM, flexibility, posture, and core stabilization for 10 minutes including:    Nutstep 10 min level 1    May participated in neuromuscular re-education activities to improve: Balance, Coordination, Kinesthetic, Sense, Proprioception, and Posture for 33 minutes. The following activities were included:  // bars    Heel raises 30 x   Gastroc stretch 3x30 L/R.  Standing hip extension and abduction with 20/20 L/R  Standing hamstring curls 2 x 15  3# weight today   Long arc quad 3# 2 x 15   Resisted lateral walks with GTB 10 ft x 5   Monster walks OTB around thighs 10 ft x 10 FWD and 10 ft BCK 10 NP    Mini squats 30 airex  Marching 30/30  airex.  HR/TR 30 airex   Gastroc stretch 3x30 L/R.  Mat;  LTR w/ball 30  DKTC 30 w/ball  Clams RTB 30  Hip ADD 30 w/ball.  Bridges 3 x 10 with cues for glute activation   SLR 3X10 AROM LT     Gait without AD including outdoor ambulation over uneven terrain and navigation of ramps.     May participated in dynamic functional therapeutic activities to improve functional performance for 10  minutes, including:  Side stepping // bars 10' X 2 X 5  Gait without AD 2X120' CGA.  Up and down stairs 2x10  May participated in gait training to improve functional mobility and safety for 0 minutes, including:    Gait training with single point cane including navigation of uneven terrain, ramps, and curbs . CGA for safety       Patient Education and HEP     She was not compliant with home exercise program as she has not yet been issued one.    Education provided:   - home exercise program to be provided     Written Home Exercises Provided: yes.  Exercises were reviewed and May was able to demonstrate them prior to the end of the session.  May demonstrated good  understanding of the education provided.     See EMR under Patient Instructions for exercises provided  1/29/24 .    Assessment     Patient was able to perform gait training without loss of balance including navigation of several environmental obstacles including ramps and uneven ground.     Pt will continue to benefit from skilled outpatient physical therapy to address the deficits listed in the problem list box on initial evaluation, provide pt/family education and to maximize pt's level of independence in the home and community environment.     May Is progressing well towards her goals.   Pt prognosis is Good.     Pt's spiritual, cultural and educational needs considered and pt agreeable to plan of care and goals.    Anticipated barriers to physical therapy: none    Goals: Goals:  SHORT TERM GOALS:  6 weeks  Progress Date met   Recent signs and systems  trend is improving in order to progress towards Long term goals.  [] Met  [] Not Met  [x] Progressing     Patient will be independent with Home Exercise Program  in order to further progress and return to maximal function. [] Met  [] Not Met  [x] Progressing     Pain rating at Worst: 5 /10 in order to progress towards increased independence with activity. [] Met  [] Not Met  [x] Progressing     Patient will be able to correct postural deviations in sitting and standing, to decrease pain and promote postural awareness for injury prevention.  [] Met  [] Not Met  [x] Progressing     Patient will improve functional outcome (FOTO) score: by 5% to increase self-worth & perceived functional ability towards long term goals [] Met  [] Not Met  [x] Progressing        LONG TERM GOALS: 12 weeks  Progress Date met   Patient will return to normal activites of daily living, recreational, and work related activities with less pain and limitation.  [] Met  [] Not Met  [x] Progressing     Patient will improve range of motion  to stated goals in order to return to maximal functional potential. ROM of left hip is WNL/WFL in all planes. [] Met  [] Not Met  [x] Progressing     Patient will improve Strength to stated goals of appropriate musculature in order to improve functional independence. Strength of left hip 5/5 in all planes. [] Met  [] Not Met  [x] Progressing     Pain Rating at Best: 1/10 to improve Quality of Life.  [] Met  [] Not Met  [x] Progressing     Patient will meet predicted functional outcome (FOTO) score: 90% to increase self-worth & perceived functional ability.Gait without AD,without abnormalities. [] Met  [] Not Met  [x] Progressing     Patient will have met/partially met personal goal of: return to previous LOF, ambulate without AD. [] Met  [] Not Met  [x] Progressing         Plan     Continue with the plan of care established per initial evaluation    Plan of care Certification: 1/9/2024 to 4/12/24.     Outpatient  Physical Therapy 2 times weekly for 12 weeks    Sekou Orellana, PT

## 2024-04-03 ENCOUNTER — CLINICAL SUPPORT (OUTPATIENT)
Dept: REHABILITATION | Facility: HOSPITAL | Age: 74
End: 2024-04-03
Payer: MEDICARE

## 2024-04-03 DIAGNOSIS — R26.9 GAIT ABNORMALITY: Primary | ICD-10-CM

## 2024-04-03 PROCEDURE — 97110 THERAPEUTIC EXERCISES: CPT | Mod: PO

## 2024-04-03 PROCEDURE — 97112 NEUROMUSCULAR REEDUCATION: CPT | Mod: PO

## 2024-04-03 PROCEDURE — 97530 THERAPEUTIC ACTIVITIES: CPT | Mod: PO

## 2024-04-03 NOTE — PROGRESS NOTES
[facility]  Outpatient Physical Therapy Daily Treatment Note      [facility]  [facility] Number: [id number]  Visit Date: 4/3/2024    Therapy Diagnosis:   Encounter Diagnosis   Name Primary?    Gait abnormality Yes       Physician: Ari Lama, *    Physician Orders: PT Eval and Treat  2-3 times a week for 4-6 wks.  Medical Diagnosis from Referral: S/P hip replacement , left.  Sciatica of left side.  Evaluation Date: 1/9/2024  Authorization Period Expiration: 12/28/24.  Plan of Care Expiration: 4/12/24  Progress Note Due: 2/8/24  Date of Surgery: 12/13/23 LT VI  Visit # / Visits authorized: 19/ 20  PTA visit: 0/5    FOTO: 2/ 3 99/100   2nd FOTO done 3/18/24  FOTO VISIT 8 65/100.     Precautions: Standard and Fall. Posterior approach.WBAT.     Time In: 1340  Time Out:1433  Total Billable Time: 53 minutes    Subjective     The patient reports she is walking without an assistive device at home and is now using SPC in the community. She is scheduled to see her doctor tomorrow.     Response to previous treatment: min soreness   Functional change: gait improving.Ambulating without AD.    Pain: 0/10  Location: left hip      Objective     May received therapeutic exercises to develop strength, endurance, ROM, flexibility, posture, and core stabilization for 10 minutes including:    Nutstep 10 min level 1    May participated in neuromuscular re-education activities to improve: Balance, Coordination, Kinesthetic, Sense, Proprioception, and Posture for 33 minutes. The following activities were included:  // bars    Heel raises 30 x   Gastroc stretch 3x30 L/R.  Standing hip extension and abduction with 20/20 L/R  Standing hamstring curls 2 x 15  3# weight today   Long arc quad 3# 2 x 15   Resisted lateral walks with GTB 10 ft x 5   Monster walks OTB around thighs 10 ft x 10 FWD and 10 ft BCK 10 NP    Mini squats 30 airex  Marching 30/30  airex.  HR/TR 30 airex   Gastroc stretch 3x30 L/R.  Mat;  LTR w/ball 30  DKTC 30 w/ball  Clams RTB 30  Hip ADD 30 w/ball.  Bridges 3 x 10 with cues for glute activation   SLR 3X10 AROM LT     Gait without AD including outdoor ambulation over uneven terrain and navigation of ramps.     May participated in dynamic functional therapeutic activities to improve functional performance for 10  minutes, including:  Side stepping // bars 10' X 2 X 5  Gait without AD 2X120' CGA.  Up and down stairs 2x10  May participated in gait training to improve functional mobility and safety for 0 minutes, including:    Gait training with single point cane including navigation of uneven terrain, ramps, and curbs . CGA for safety       Patient Education and HEP     She was not compliant with home exercise program as she has not yet been issued one.    Education provided:   - home exercise program to be provided     Written Home Exercises Provided: yes.  Exercises were reviewed and May was able to demonstrate them prior to the end of the session.  May demonstrated good  understanding of the education provided.     See EMR under Patient Instructions for exercises provided  1/29/24 .    Assessment     Goals met.  Patient was able to perform gait training without loss of balance including navigation of several environmental obstacles including ramps and uneven ground.     May Is progressing well towards her goals.   Pt prognosis is Good.     Pt's spiritual, cultural and educational needs considered and pt agreeable to plan of care and goals.    Anticipated barriers to physical therapy: none    Goals: Goals:  SHORT TERM GOALS:  6 weeks  Progress Date met   Recent signs and systems trend is improving in order to progress towards Long term goals.  [] Met  [] Not Met  [x] Progressing     Patient will be independent with Home Exercise Program  in order to further progress and return to maximal function. [] Met  [] Not Met  [x]  Progressing     Pain rating at Worst: 5 /10 in order to progress towards increased independence with activity. [] Met  [] Not Met  [x] Progressing     Patient will be able to correct postural deviations in sitting and standing, to decrease pain and promote postural awareness for injury prevention.  [] Met  [] Not Met  [x] Progressing     Patient will improve functional outcome (FOTO) score: by 5% to increase self-worth & perceived functional ability towards long term goals [] Met  [] Not Met  [x] Progressing        LONG TERM GOALS: 12 weeks  Progress Date met   Patient will return to normal activites of daily living, recreational, and work related activities with less pain and limitation.  [x] Met  [] Not Met  [] Progressing     Patient will improve range of motion  to stated goals in order to return to maximal functional potential. ROM of left hip is WNL/WFL in all planes. [x] Met  [] Not Met  [] Progressing     Patient will improve Strength to stated goals of appropriate musculature in order to improve functional independence. Strength of left hip 5/5 in all planes. [x] Met  [] Not Met  [] Progressing     Pain Rating at Best: 1/10 to improve Quality of Life.  [x] Met  [] Not Met  [] Progressing     Patient will meet predicted functional outcome (FOTO) score: 90% to increase self-worth & perceived functional ability.Gait without AD,without abnormalities. [x] Met  [] Not Met  [] Progressing     Patient will have met/partially met personal goal of: return to previous LOF, ambulate without AD. [x] Met  [] Not Met  [] Progressing         Plan     DC per POC    Plan of care Certification: 1/9/2024 to 4/12/24.     Outpatient Physical Therapy 2 times weekly for 12 weeks    Sekou Orellana, PT

## 2024-04-03 NOTE — PROGRESS NOTES
[facility]  PT Discharge Note    Name: May EDWARDS [facility] Number: [id number]    Therapy Diagnosis:   Encounter Diagnosis   Name Primary?    Gait abnormality Yes     Physician: Ari Lama, *    Physician Orders: Eval and treat.  Medical Diagnosis: S/P LT VI  Evaluation Date: 1/9/23      Date of Last visit: 4/3/24  Total Visits Received: 20    ASSESSMENT      Goals met.    Discharge reason: Patient has met all of his/her goals    Discharge FOTO Score: 65/100    Goals: Met  No pain 0/10.  ROM of left hip WNL/WFL in all planes.  Strength 5/5 in all planes.  Gait WNL without AD.  Pt is independent with HEP.    PLAN   This patient is discharged from Physical Therapy with HEP PRN.      Sekou Orellana, PT

## 2024-04-10 ENCOUNTER — PATIENT OUTREACH (OUTPATIENT)
Dept: ADMINISTRATIVE | Facility: HOSPITAL | Age: 74
End: 2024-04-10
Payer: MEDICARE

## 2024-04-10 NOTE — PROGRESS NOTES
Population Health Chart Review & Patient Outreach Details      Additional Pop Health Notes:               Updates Requested / Reviewed:      Updated Care Coordination Note         Health Maintenance Topics Overdue:      VB Score: 0     Patient is not due for any topics at this time.    RSV Vaccine                  Health Maintenance Topic(s) Outreach Outcomes & Actions Taken:    Eye Exam - Outreach Outcomes & Actions Taken  : Diabetic Eye External Records Uploaded, Care Team & History Updated if Applicable

## (undated) DEVICE — SPONGE GAUZE 10S 4X4  442214

## (undated) DEVICE — TUBING CYSTO DOUBLE 654301

## (undated) DEVICE — UNDERGLOVE BIOGEL MICRO BLUE SZ 8.5

## (undated) DEVICE — ADHESIVE MASTISOL VIAL 0523-48

## (undated) DEVICE — PULSAEVAC 0210-158-000

## (undated) DEVICE — SOLUTION IRRI NS BOTTLE 1000ML R5200-01

## (undated) DEVICE — GLOVE BIOGEL PI GOLD SZ  8.5

## (undated) DEVICE — BLADE AGRESSIVE PLUS 4.0 375-544-000

## (undated) DEVICE — SUTURE ETHILON 4-0 PS-2 18 1667H

## (undated) DEVICE — TIP BOVIE 6.5 0014

## (undated) DEVICE — TRAY UPPER EXTREMITY

## (undated) DEVICE — SUTURE FIBERWIRE #2 AR7206

## (undated) DEVICE — BLADE SAGITTAL 2108-176-000

## (undated) DEVICE — BLADE 90-S SERFAS 3.5 279-351-100

## (undated) DEVICE — PAD ABD 5X9   7196D

## (undated) DEVICE — SOLUTION NACL 0.9% 3000ML

## (undated) DEVICE — PAD BOVIE ADULT

## (undated) DEVICE — SUTURE VICRYL 2-0 CT-1 36 VCP945H

## (undated) DEVICE — BANDAGE ACE STERILE 4 REB3114

## (undated) DEVICE — COVER MAYO STAND 89601

## (undated) DEVICE — DRAPE SPLIT W/ ADHESIVE

## (undated) DEVICE — TRAY TOTAL HIP

## (undated) DEVICE — DRAPE IMPERVIOUS SPLIT 89331

## (undated) DEVICE — DRESSING ADAPTIC 3X3 6112

## (undated) DEVICE — SOLUTION IRRI H2O BOTTLE 1000ML

## (undated) DEVICE — TOGA FLYTE  XL KRIEGER  0408-830-100

## (undated) DEVICE — CUFF TOURNIQUET 18DUAL PRT 5921-218-235

## (undated) DEVICE — PADDING CAST 2 STERILE 30-319

## (undated) DEVICE — DRAPE U-SLOT 1019

## (undated) DEVICE — SPONGE GAUZE 2S 4X4 STERILE NON21424

## (undated) DEVICE — SUTURE MONOCRYL 4-0 PS-2 27 MCP426H

## (undated) DEVICE — PILLOW HIP ABDUCTION MEDIUM

## (undated) DEVICE — CUFF TOURNIQUET 34DUAL PRT 5921-034-235

## (undated) DEVICE — PREP CHLORA 26ML

## (undated) DEVICE — CONNECTOR 5-IN-1 CON2001

## (undated) DEVICE — STERISTRIP 1/2 R1547

## (undated) DEVICE — PACK ARTHROSCOPY SMHS009-07

## (undated) DEVICE — SUTURE VICRYL 1 CTX 36 J977H